# Patient Record
Sex: MALE | Race: WHITE
[De-identification: names, ages, dates, MRNs, and addresses within clinical notes are randomized per-mention and may not be internally consistent; named-entity substitution may affect disease eponyms.]

---

## 2017-02-23 ENCOUNTER — HOSPITAL ENCOUNTER (INPATIENT)
Dept: HOSPITAL 54 - ER | Age: 57
LOS: 8 days | Discharge: NURSING, REHAB OR LONG-TERM CARE | DRG: 466 | End: 2017-03-03
Attending: INTERNAL MEDICINE | Admitting: INTERNAL MEDICINE
Payer: COMMERCIAL

## 2017-02-23 VITALS — HEIGHT: 62 IN | WEIGHT: 122.03 LBS | BODY MASS INDEX: 22.46 KG/M2

## 2017-02-23 VITALS — DIASTOLIC BLOOD PRESSURE: 54 MMHG | SYSTOLIC BLOOD PRESSURE: 124 MMHG

## 2017-02-23 DIAGNOSIS — J44.9: ICD-10-CM

## 2017-02-23 DIAGNOSIS — K21.9: ICD-10-CM

## 2017-02-23 DIAGNOSIS — I50.32: ICD-10-CM

## 2017-02-23 DIAGNOSIS — Z99.2: ICD-10-CM

## 2017-02-23 DIAGNOSIS — N18.6: ICD-10-CM

## 2017-02-23 DIAGNOSIS — T82.510A: Primary | ICD-10-CM

## 2017-02-23 DIAGNOSIS — I25.10: ICD-10-CM

## 2017-02-23 DIAGNOSIS — I48.91: ICD-10-CM

## 2017-02-23 DIAGNOSIS — E78.5: ICD-10-CM
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
## 2017-02-23 DIAGNOSIS — D64.9: ICD-10-CM

## 2017-02-23 DIAGNOSIS — I13.0: ICD-10-CM
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
## 2017-02-23 DIAGNOSIS — E11.22: ICD-10-CM

## 2017-02-23 LAB
ALBUMIN SERPL BCP-MCNC: 2.6 G/DL (ref 3.4–5)
ALT SERPL W P-5'-P-CCNC: 14 U/L (ref 12–78)
ANION GAP SERPL CALC-SCNC: 12 MMOL/L (ref 5–14)
APTT PPP: 33 SEC (ref 23–34)
AST SERPL W P-5'-P-CCNC: 54 U/L (ref 15–37)
BASOPHILS # BLD AUTO: 0 /CMM (ref 0–0.2)
BASOPHILS NFR BLD AUTO: 0.7 % (ref 0–2)
BILIRUB DIRECT SERPL-MCNC: 0.2 MG/DL (ref 0–0.2)
BILIRUB INDIRECT SERPL-MCNC: 0.3 MG/DL (ref 0–1.1)
BILIRUB SERPL-MCNC: 0.5 MG/DL (ref 0.2–1)
BUN SERPL-MCNC: 51 MG/DL (ref 7–18)
CALCIUM SERPL-MCNC: 8.7 MG/DL (ref 8.5–10.1)
CHLORIDE SERPL-SCNC: 100 MMOL/L (ref 98–107)
CO2 SERPL-SCNC: 28 MMOL/L (ref 21–32)
CREAT SERPL-MCNC: 7.7 MG/DL (ref 0.6–1.3)
DIFF TOTAL %: 100 %
EOSINOPHIL # BLD AUTO: 0.5 /CMM (ref 0–0.7)
EOSINOPHIL NFR BLD AUTO: 8.4 % (ref 0–6)
GFR SERPLBLD BASED ON 1.73 SQ M-ARVRAT: 7 ML/MIN (ref 60–?)
GLUCOSE SERPL-MCNC: 47 MG/DL (ref 74–106)
HCT VFR BLD AUTO: 26 % (ref 39–51)
HGB BLD-MCNC: 8.4 G/DL (ref 13.5–17.5)
INR PPP: 1.2 (ref 0.87–1.13)
LYMPHOCYTES NFR BLD AUTO: 1.3 /CMM (ref 0.8–4.8)
LYMPHOCYTES NFR BLD AUTO: 21.4 % (ref 20–44)
MCH RBC QN AUTO: 30 PG (ref 26–33)
MCHC RBC AUTO-ENTMCNC: 32 G/DL (ref 31–36)
MCV RBC AUTO: 94 FL (ref 80–96)
MONOCYTES NFR BLD AUTO: 0.6 /CMM (ref 0.1–1.3)
MONOCYTES NFR BLD AUTO: 9.5 % (ref 2–12)
NEUTROPHILS # BLD AUTO: 3.8 /CMM (ref 1.8–8.9)
NEUTROPHILS NFR BLD AUTO: 60 % (ref 43–81)
PLATELET # BLD AUTO: 209 /CMM (ref 150–450)
POTASSIUM SERPL-SCNC: 4.5 MMOL/L (ref 3.5–5.1)
PROT SERPL-MCNC: 6.9 G/DL (ref 6.4–8.2)
PROTHROMBIN TIME: 12.6 SECS (ref 9.5–12.7)
RBC # BLD AUTO: 2.79 MIL/UL (ref 4.5–6)
SODIUM SERPL-SCNC: 135 MMOL/L (ref 136–145)
WBC NRBC COR # BLD AUTO: 6.2 K/UL (ref 4.3–11)

## 2017-02-23 PROCEDURE — P9047 ALBUMIN (HUMAN), 25%, 50ML: HCPCS

## 2017-02-23 PROCEDURE — A4606 OXYGEN PROBE USED W OXIMETER: HCPCS

## 2017-02-23 PROCEDURE — A6403 STERILE GAUZE>16 <= 48 SQ IN: HCPCS

## 2017-02-23 PROCEDURE — Z7610: HCPCS

## 2017-02-23 PROCEDURE — A6402 STERILE GAUZE <= 16 SQ IN: HCPCS

## 2017-02-23 PROCEDURE — A4216 STERILE WATER/SALINE, 10 ML: HCPCS

## 2017-02-23 PROCEDURE — A6248 HYDROGEL DRSG GEL FILLER: HCPCS

## 2017-02-23 PROCEDURE — C1769 GUIDE WIRE: HCPCS

## 2017-02-23 PROCEDURE — C1750 CATH, HEMODIALYSIS,LONG-TERM: HCPCS

## 2017-02-24 VITALS — DIASTOLIC BLOOD PRESSURE: 70 MMHG | SYSTOLIC BLOOD PRESSURE: 140 MMHG

## 2017-02-24 VITALS — DIASTOLIC BLOOD PRESSURE: 60 MMHG | SYSTOLIC BLOOD PRESSURE: 126 MMHG

## 2017-02-24 VITALS — DIASTOLIC BLOOD PRESSURE: 70 MMHG | SYSTOLIC BLOOD PRESSURE: 130 MMHG

## 2017-02-24 VITALS — SYSTOLIC BLOOD PRESSURE: 123 MMHG | DIASTOLIC BLOOD PRESSURE: 64 MMHG

## 2017-02-24 VITALS — DIASTOLIC BLOOD PRESSURE: 65 MMHG | SYSTOLIC BLOOD PRESSURE: 129 MMHG

## 2017-02-24 VITALS — DIASTOLIC BLOOD PRESSURE: 69 MMHG | SYSTOLIC BLOOD PRESSURE: 131 MMHG

## 2017-02-24 LAB
ALBUMIN SERPL BCP-MCNC: 2.5 G/DL (ref 3.4–5)
ALT SERPL W P-5'-P-CCNC: 24 U/L (ref 12–78)
ANION GAP SERPL CALC-SCNC: 13 MMOL/L (ref 5–14)
AST SERPL W P-5'-P-CCNC: 45 U/L (ref 15–37)
BASOPHILS # BLD AUTO: 0.1 /CMM (ref 0–0.2)
BASOPHILS NFR BLD AUTO: 1 % (ref 0–2)
BILIRUB SERPL-MCNC: 0.5 MG/DL (ref 0.2–1)
BUN SERPL-MCNC: 53 MG/DL (ref 7–18)
CALCIUM SERPL-MCNC: 8.6 MG/DL (ref 8.5–10.1)
CHLORIDE SERPL-SCNC: 99 MMOL/L (ref 98–107)
CO2 SERPL-SCNC: 29 MMOL/L (ref 21–32)
CREAT SERPL-MCNC: 8.5 MG/DL (ref 0.6–1.3)
DIFF TOTAL %: 100 %
EOSINOPHIL # BLD AUTO: 0.5 /CMM (ref 0–0.7)
EOSINOPHIL NFR BLD AUTO: 9.4 % (ref 0–6)
GFR SERPLBLD BASED ON 1.73 SQ M-ARVRAT: 6 ML/MIN (ref 60–?)
GLUCOSE SERPL-MCNC: 76 MG/DL (ref 74–106)
HCT VFR BLD AUTO: 24 % (ref 39–51)
HGB BLD-MCNC: 7.5 G/DL (ref 13.5–17.5)
IRON SERPL-MCNC: 53 UG/DL (ref 50–175)
LYMPHOCYTES NFR BLD AUTO: 1.1 /CMM (ref 0.8–4.8)
LYMPHOCYTES NFR BLD AUTO: 20.5 % (ref 20–44)
MCH RBC QN AUTO: 30 PG (ref 26–33)
MCHC RBC AUTO-ENTMCNC: 32 G/DL (ref 31–36)
MCV RBC AUTO: 93 FL (ref 80–96)
MONOCYTES NFR BLD AUTO: 0.5 /CMM (ref 0.1–1.3)
MONOCYTES NFR BLD AUTO: 9.5 % (ref 2–12)
NEUTROPHILS # BLD AUTO: 3.2 /CMM (ref 1.8–8.9)
NEUTROPHILS NFR BLD AUTO: 59.6 % (ref 43–81)
PHOSPHATE SERPL-MCNC: 6 MG/DL (ref 2.5–4.9)
PLATELET # BLD AUTO: 213 /CMM (ref 150–450)
POTASSIUM SERPL-SCNC: 4.9 MMOL/L (ref 3.5–5.1)
PROT SERPL-MCNC: 6.8 G/DL (ref 6.4–8.2)
RBC # BLD AUTO: 2.52 MIL/UL (ref 4.5–6)
SAO2 % BLD FROM PO2: 56 % (ref 14–33)
SODIUM SERPL-SCNC: 136 MMOL/L (ref 136–145)
TIBC SERPL-MCNC: 94 UG/DL (ref 250–450)
WBC NRBC COR # BLD AUTO: 5.4 K/UL (ref 4.3–11)

## 2017-02-24 RX ADMIN — ASPIRIN 81 MG SCH MG: 81 TABLET ORAL at 11:54

## 2017-02-24 RX ADMIN — Medication SCH TAB: at 11:55

## 2017-02-24 RX ADMIN — Medication SCH EACH: at 21:40

## 2017-02-24 RX ADMIN — LEVETIRACETAM SCH MG: 250 TABLET, FILM COATED ORAL at 11:55

## 2017-02-24 RX ADMIN — METOPROLOL TARTRATE SCH MG: 50 TABLET, FILM COATED ORAL at 11:55

## 2017-02-24 RX ADMIN — SILVER SULFADIAZINE SCH GM: 10 CREAM TOPICAL at 17:00

## 2017-02-24 RX ADMIN — LISINOPRIL SCH MG: 20 TABLET ORAL at 11:00

## 2017-02-24 RX ADMIN — AMLODIPINE BESYLATE SCH MG: 10 TABLET ORAL at 09:30

## 2017-02-24 RX ADMIN — Medication SCH MG: at 11:55

## 2017-02-24 RX ADMIN — SILVER SULFADIAZINE SCH GM: 10 CREAM TOPICAL at 15:11

## 2017-02-24 RX ADMIN — METOPROLOL TARTRATE SCH MG: 50 TABLET, FILM COATED ORAL at 21:41

## 2017-02-24 RX ADMIN — ESCITALOPRAM OXALATE SCH MG: 10 TABLET, FILM COATED ORAL at 11:55

## 2017-02-24 RX ADMIN — EPOETIN ALFA SCH UNIT: 10000 SOLUTION INTRAVENOUS; SUBCUTANEOUS at 15:10

## 2017-02-24 RX ADMIN — AMIODARONE HYDROCHLORIDE SCH MG: 200 TABLET ORAL at 11:55

## 2017-02-24 RX ADMIN — ATORVASTATIN CALCIUM SCH MG: 40 TABLET, FILM COATED ORAL at 21:41

## 2017-02-25 VITALS — DIASTOLIC BLOOD PRESSURE: 59 MMHG | SYSTOLIC BLOOD PRESSURE: 131 MMHG

## 2017-02-25 VITALS — SYSTOLIC BLOOD PRESSURE: 120 MMHG | DIASTOLIC BLOOD PRESSURE: 54 MMHG

## 2017-02-25 VITALS — SYSTOLIC BLOOD PRESSURE: 131 MMHG | DIASTOLIC BLOOD PRESSURE: 59 MMHG

## 2017-02-25 VITALS — SYSTOLIC BLOOD PRESSURE: 114 MMHG | DIASTOLIC BLOOD PRESSURE: 40 MMHG

## 2017-02-25 LAB
ANION GAP SERPL CALC-SCNC: 15 MMOL/L (ref 5–14)
BASOPHILS # BLD AUTO: 0 /CMM (ref 0–0.2)
BASOPHILS NFR BLD AUTO: 0.5 % (ref 0–2)
BUN SERPL-MCNC: 58 MG/DL (ref 7–18)
CALCIUM SERPL-MCNC: 8.7 MG/DL (ref 8.5–10.1)
CHLORIDE SERPL-SCNC: 103 MMOL/L (ref 98–107)
CO2 SERPL-SCNC: 28 MMOL/L (ref 21–32)
CREAT SERPL-MCNC: 9.3 MG/DL (ref 0.6–1.3)
DIFF TOTAL %: 100 %
EOSINOPHIL # BLD AUTO: 0.7 /CMM (ref 0–0.7)
EOSINOPHIL NFR BLD AUTO: 12.2 % (ref 0–6)
GFR SERPLBLD BASED ON 1.73 SQ M-ARVRAT: 6 ML/MIN (ref 60–?)
GLUCOSE SERPL-MCNC: 75 MG/DL (ref 74–106)
HCT VFR BLD AUTO: 26 % (ref 39–51)
HGB BLD-MCNC: 8 G/DL (ref 13.5–17.5)
LYMPHOCYTES NFR BLD AUTO: 1.4 /CMM (ref 0.8–4.8)
LYMPHOCYTES NFR BLD AUTO: 24.6 % (ref 20–44)
MCH RBC QN AUTO: 29 PG (ref 26–33)
MCHC RBC AUTO-ENTMCNC: 31 G/DL (ref 31–36)
MCV RBC AUTO: 93 FL (ref 80–96)
MONOCYTES NFR BLD AUTO: 0.7 /CMM (ref 0.1–1.3)
MONOCYTES NFR BLD AUTO: 11.8 % (ref 2–12)
NEUTROPHILS # BLD AUTO: 2.9 /CMM (ref 1.8–8.9)
NEUTROPHILS NFR BLD AUTO: 50.9 % (ref 43–81)
PHOSPHATE SERPL-MCNC: 5.6 MG/DL (ref 2.5–4.9)
PLATELET # BLD AUTO: 231 /CMM (ref 150–450)
POTASSIUM SERPL-SCNC: 5.2 MMOL/L (ref 3.5–5.1)
RBC # BLD AUTO: 2.76 MIL/UL (ref 4.5–6)
SODIUM SERPL-SCNC: 141 MMOL/L (ref 136–145)
WBC NRBC COR # BLD AUTO: 5.8 K/UL (ref 4.3–11)

## 2017-02-25 PROCEDURE — B514YZA FLUOROSCOPY OF LEFT JUGULAR VEINS USING OTHER CONTRAST, GUIDANCE: ICD-10-PCS | Performed by: SURGERY

## 2017-02-25 PROCEDURE — 05HN33Z INSERTION OF INFUSION DEVICE INTO LEFT INTERNAL JUGULAR VEIN, PERCUTANEOUS APPROACH: ICD-10-PCS | Performed by: SURGERY

## 2017-02-25 RX ADMIN — ATORVASTATIN CALCIUM SCH MG: 40 TABLET, FILM COATED ORAL at 21:46

## 2017-02-25 RX ADMIN — LEVETIRACETAM SCH MG: 250 TABLET, FILM COATED ORAL at 11:30

## 2017-02-25 RX ADMIN — AMIODARONE HYDROCHLORIDE SCH MG: 200 TABLET ORAL at 08:29

## 2017-02-25 RX ADMIN — Medication SCH EACH: at 21:50

## 2017-02-25 RX ADMIN — Medication SCH EACH: at 06:27

## 2017-02-25 RX ADMIN — Medication SCH MG: at 08:28

## 2017-02-25 RX ADMIN — AMLODIPINE BESYLATE SCH MG: 10 TABLET ORAL at 08:26

## 2017-02-25 RX ADMIN — LISINOPRIL SCH MG: 20 TABLET ORAL at 08:27

## 2017-02-25 RX ADMIN — Medication SCH TAB: at 08:28

## 2017-02-25 RX ADMIN — ESCITALOPRAM OXALATE SCH MG: 10 TABLET, FILM COATED ORAL at 08:28

## 2017-02-25 RX ADMIN — Medication SCH EACH: at 11:33

## 2017-02-25 RX ADMIN — SILVER SULFADIAZINE SCH GM: 10 CREAM TOPICAL at 08:31

## 2017-02-25 RX ADMIN — METOPROLOL TARTRATE SCH MG: 50 TABLET, FILM COATED ORAL at 08:30

## 2017-02-25 RX ADMIN — SILVER SULFADIAZINE SCH GM: 10 CREAM TOPICAL at 17:47

## 2017-02-25 RX ADMIN — ASPIRIN 81 MG SCH MG: 81 TABLET ORAL at 08:28

## 2017-02-25 RX ADMIN — METOPROLOL TARTRATE SCH MG: 50 TABLET, FILM COATED ORAL at 21:46

## 2017-02-25 RX ADMIN — Medication SCH EACH: at 17:42

## 2017-02-25 RX ADMIN — EPOETIN ALFA SCH UNIT: 10000 SOLUTION INTRAVENOUS; SUBCUTANEOUS at 12:34

## 2017-02-26 VITALS — SYSTOLIC BLOOD PRESSURE: 134 MMHG | DIASTOLIC BLOOD PRESSURE: 62 MMHG

## 2017-02-26 VITALS — DIASTOLIC BLOOD PRESSURE: 66 MMHG | SYSTOLIC BLOOD PRESSURE: 128 MMHG

## 2017-02-26 VITALS — DIASTOLIC BLOOD PRESSURE: 63 MMHG | SYSTOLIC BLOOD PRESSURE: 142 MMHG

## 2017-02-26 VITALS — DIASTOLIC BLOOD PRESSURE: 56 MMHG | SYSTOLIC BLOOD PRESSURE: 135 MMHG

## 2017-02-26 RX ADMIN — Medication SCH EACH: at 21:44

## 2017-02-26 RX ADMIN — ATORVASTATIN CALCIUM SCH MG: 40 TABLET, FILM COATED ORAL at 21:45

## 2017-02-26 RX ADMIN — Medication SCH TAB: at 08:29

## 2017-02-26 RX ADMIN — APIXABAN SCH MG: 2.5 TABLET, FILM COATED ORAL at 10:53

## 2017-02-26 RX ADMIN — METOPROLOL TARTRATE SCH MG: 50 TABLET, FILM COATED ORAL at 22:11

## 2017-02-26 RX ADMIN — SILVER SULFADIAZINE SCH GM: 10 CREAM TOPICAL at 16:37

## 2017-02-26 RX ADMIN — MUPIROCIN SCH GM: 20 OINTMENT TOPICAL at 21:45

## 2017-02-26 RX ADMIN — AMIODARONE HYDROCHLORIDE SCH MG: 200 TABLET ORAL at 08:31

## 2017-02-26 RX ADMIN — LISINOPRIL SCH MG: 20 TABLET ORAL at 08:30

## 2017-02-26 RX ADMIN — ESCITALOPRAM OXALATE SCH MG: 10 TABLET, FILM COATED ORAL at 08:29

## 2017-02-26 RX ADMIN — AMLODIPINE BESYLATE SCH MG: 10 TABLET ORAL at 08:29

## 2017-02-26 RX ADMIN — METOPROLOL TARTRATE SCH MG: 50 TABLET, FILM COATED ORAL at 21:00

## 2017-02-26 RX ADMIN — Medication SCH MG: at 08:29

## 2017-02-26 RX ADMIN — SILVER SULFADIAZINE SCH GM: 10 CREAM TOPICAL at 08:39

## 2017-02-26 RX ADMIN — Medication SCH EACH: at 06:30

## 2017-02-26 RX ADMIN — LEVETIRACETAM SCH MG: 250 TABLET, FILM COATED ORAL at 10:52

## 2017-02-26 RX ADMIN — MUPIROCIN SCH GM: 20 OINTMENT TOPICAL at 10:53

## 2017-02-26 RX ADMIN — ASPIRIN 81 MG SCH MG: 81 TABLET ORAL at 08:29

## 2017-02-26 RX ADMIN — METOPROLOL TARTRATE SCH MG: 50 TABLET, FILM COATED ORAL at 08:30

## 2017-02-26 RX ADMIN — Medication SCH EACH: at 16:53

## 2017-02-26 RX ADMIN — Medication SCH EACH: at 12:22

## 2017-02-26 RX ADMIN — APIXABAN SCH MG: 2.5 TABLET, FILM COATED ORAL at 21:48

## 2017-02-27 VITALS — SYSTOLIC BLOOD PRESSURE: 142 MMHG | DIASTOLIC BLOOD PRESSURE: 63 MMHG

## 2017-02-27 VITALS — DIASTOLIC BLOOD PRESSURE: 54 MMHG | SYSTOLIC BLOOD PRESSURE: 120 MMHG

## 2017-02-27 VITALS — DIASTOLIC BLOOD PRESSURE: 55 MMHG | SYSTOLIC BLOOD PRESSURE: 140 MMHG

## 2017-02-27 PROCEDURE — 5A1D60Z: ICD-10-PCS | Performed by: INTERNAL MEDICINE

## 2017-02-27 RX ADMIN — Medication SCH TAB: at 10:48

## 2017-02-27 RX ADMIN — Medication SCH EACH: at 16:43

## 2017-02-27 RX ADMIN — MUPIROCIN SCH APPLIC: 20 OINTMENT TOPICAL at 21:54

## 2017-02-27 RX ADMIN — AMLODIPINE BESYLATE SCH MG: 10 TABLET ORAL at 10:47

## 2017-02-27 RX ADMIN — METOPROLOL TARTRATE SCH MG: 50 TABLET, FILM COATED ORAL at 09:00

## 2017-02-27 RX ADMIN — ATORVASTATIN CALCIUM SCH MG: 40 TABLET, FILM COATED ORAL at 21:54

## 2017-02-27 RX ADMIN — APIXABAN SCH MG: 2.5 TABLET, FILM COATED ORAL at 10:47

## 2017-02-27 RX ADMIN — Medication SCH EACH: at 06:34

## 2017-02-27 RX ADMIN — APIXABAN SCH MG: 2.5 TABLET, FILM COATED ORAL at 16:43

## 2017-02-27 RX ADMIN — AMIODARONE HYDROCHLORIDE SCH MG: 200 TABLET ORAL at 09:00

## 2017-02-27 RX ADMIN — METOPROLOL TARTRATE SCH MG: 50 TABLET, FILM COATED ORAL at 21:54

## 2017-02-27 RX ADMIN — LEVETIRACETAM SCH MG: 250 TABLET, FILM COATED ORAL at 10:47

## 2017-02-27 RX ADMIN — MUPIROCIN SCH GM: 20 OINTMENT TOPICAL at 10:49

## 2017-02-27 RX ADMIN — Medication SCH MG: at 10:47

## 2017-02-27 RX ADMIN — SILVER SULFADIAZINE SCH GM: 10 CREAM TOPICAL at 16:43

## 2017-02-27 RX ADMIN — SILVER SULFADIAZINE SCH GM: 10 CREAM TOPICAL at 10:49

## 2017-02-27 RX ADMIN — Medication SCH EACH: at 12:01

## 2017-02-27 RX ADMIN — Medication SCH EACH: at 21:54

## 2017-02-27 RX ADMIN — LISINOPRIL SCH MG: 20 TABLET ORAL at 10:47

## 2017-02-27 RX ADMIN — ESCITALOPRAM OXALATE SCH MG: 10 TABLET, FILM COATED ORAL at 10:47

## 2017-02-27 RX ADMIN — ASPIRIN 81 MG SCH MG: 81 TABLET ORAL at 10:48

## 2017-02-28 VITALS — SYSTOLIC BLOOD PRESSURE: 141 MMHG | DIASTOLIC BLOOD PRESSURE: 55 MMHG

## 2017-02-28 VITALS — DIASTOLIC BLOOD PRESSURE: 60 MMHG | SYSTOLIC BLOOD PRESSURE: 119 MMHG

## 2017-02-28 VITALS — SYSTOLIC BLOOD PRESSURE: 119 MMHG | DIASTOLIC BLOOD PRESSURE: 60 MMHG

## 2017-02-28 VITALS — DIASTOLIC BLOOD PRESSURE: 79 MMHG | SYSTOLIC BLOOD PRESSURE: 171 MMHG

## 2017-02-28 LAB
ANION GAP SERPL CALC-SCNC: 10 MMOL/L (ref 5–14)
BASOPHILS # BLD AUTO: 0.1 /CMM (ref 0–0.2)
BASOPHILS NFR BLD AUTO: 1 % (ref 0–2)
BUN SERPL-MCNC: 23 MG/DL (ref 7–18)
CALCIUM SERPL-MCNC: 8.5 MG/DL (ref 8.5–10.1)
CHLORIDE SERPL-SCNC: 103 MMOL/L (ref 98–107)
CO2 SERPL-SCNC: 32 MMOL/L (ref 21–32)
CREAT SERPL-MCNC: 6.2 MG/DL (ref 0.6–1.3)
DIFF TOTAL %: 100 %
EOSINOPHIL # BLD AUTO: 0.6 /CMM (ref 0–0.7)
EOSINOPHIL NFR BLD AUTO: 9.8 % (ref 0–6)
GFR SERPLBLD BASED ON 1.73 SQ M-ARVRAT: 9 ML/MIN (ref 60–?)
GLUCOSE SERPL-MCNC: 80 MG/DL (ref 74–106)
HCT VFR BLD AUTO: 23 % (ref 39–51)
HGB BLD-MCNC: 7.2 G/DL (ref 13.5–17.5)
INR PPP: 1.19 (ref 0.87–1.13)
LYMPHOCYTES NFR BLD AUTO: 1.3 /CMM (ref 0.8–4.8)
LYMPHOCYTES NFR BLD AUTO: 23.4 % (ref 20–44)
MCH RBC QN AUTO: 30 PG (ref 26–33)
MCHC RBC AUTO-ENTMCNC: 32 G/DL (ref 31–36)
MCV RBC AUTO: 95 FL (ref 80–96)
MONOCYTES NFR BLD AUTO: 0.8 /CMM (ref 0.1–1.3)
MONOCYTES NFR BLD AUTO: 13.7 % (ref 2–12)
NEUTROPHILS # BLD AUTO: 2.9 /CMM (ref 1.8–8.9)
NEUTROPHILS NFR BLD AUTO: 52.1 % (ref 43–81)
PHOSPHATE SERPL-MCNC: 4.7 MG/DL (ref 2.5–4.9)
PLATELET # BLD AUTO: 129 /CMM (ref 150–450)
POTASSIUM SERPL-SCNC: 4.1 MMOL/L (ref 3.5–5.1)
PROTHROMBIN TIME: 12.9 SECS (ref 9.5–12.7)
RBC # BLD AUTO: 2.39 MIL/UL (ref 4.5–6)
SODIUM SERPL-SCNC: 141 MMOL/L (ref 136–145)
WBC NRBC COR # BLD AUTO: 5.7 K/UL (ref 4.3–11)

## 2017-02-28 RX ADMIN — METOPROLOL TARTRATE SCH MG: 50 TABLET, FILM COATED ORAL at 08:30

## 2017-02-28 RX ADMIN — Medication SCH MG: at 08:30

## 2017-02-28 RX ADMIN — AMIODARONE HYDROCHLORIDE SCH MG: 200 TABLET ORAL at 08:30

## 2017-02-28 RX ADMIN — ASPIRIN 81 MG SCH MG: 81 TABLET ORAL at 08:30

## 2017-02-28 RX ADMIN — MUPIROCIN SCH APPLIC: 20 OINTMENT TOPICAL at 21:00

## 2017-02-28 RX ADMIN — Medication SCH EACH: at 07:30

## 2017-02-28 RX ADMIN — LEVETIRACETAM SCH MG: 250 TABLET, FILM COATED ORAL at 09:40

## 2017-02-28 RX ADMIN — AMLODIPINE BESYLATE SCH MG: 10 TABLET ORAL at 08:29

## 2017-02-28 RX ADMIN — Medication SCH EACH: at 11:34

## 2017-02-28 RX ADMIN — Medication SCH TAB: at 08:29

## 2017-02-28 RX ADMIN — MUPIROCIN SCH APPLIC: 20 OINTMENT TOPICAL at 08:29

## 2017-02-28 RX ADMIN — SILVER SULFADIAZINE SCH GM: 10 CREAM TOPICAL at 08:29

## 2017-02-28 RX ADMIN — ESCITALOPRAM OXALATE SCH MG: 10 TABLET, FILM COATED ORAL at 08:30

## 2017-02-28 RX ADMIN — ATORVASTATIN CALCIUM SCH MG: 40 TABLET, FILM COATED ORAL at 20:53

## 2017-02-28 RX ADMIN — LISINOPRIL SCH MG: 20 TABLET ORAL at 08:30

## 2017-02-28 RX ADMIN — APIXABAN SCH MG: 2.5 TABLET, FILM COATED ORAL at 09:00

## 2017-02-28 RX ADMIN — Medication SCH EACH: at 21:02

## 2017-02-28 RX ADMIN — METOPROLOL TARTRATE SCH MG: 50 TABLET, FILM COATED ORAL at 20:54

## 2017-02-28 RX ADMIN — APIXABAN SCH MG: 2.5 TABLET, FILM COATED ORAL at 16:33

## 2017-02-28 RX ADMIN — Medication SCH EACH: at 16:33

## 2017-02-28 RX ADMIN — SILVER SULFADIAZINE SCH GM: 10 CREAM TOPICAL at 16:33

## 2017-03-01 VITALS — DIASTOLIC BLOOD PRESSURE: 68 MMHG | SYSTOLIC BLOOD PRESSURE: 128 MMHG

## 2017-03-01 VITALS — DIASTOLIC BLOOD PRESSURE: 44 MMHG | SYSTOLIC BLOOD PRESSURE: 110 MMHG

## 2017-03-01 VITALS — DIASTOLIC BLOOD PRESSURE: 68 MMHG | SYSTOLIC BLOOD PRESSURE: 138 MMHG

## 2017-03-01 LAB
ANION GAP SERPL CALC-SCNC: 10 MMOL/L (ref 5–14)
BASOPHILS # BLD AUTO: 0 /CMM (ref 0–0.2)
BASOPHILS NFR BLD AUTO: 0.5 % (ref 0–2)
BUN SERPL-MCNC: 22 MG/DL (ref 7–18)
CALCIUM SERPL-MCNC: 8.1 MG/DL (ref 8.5–10.1)
CHLORIDE SERPL-SCNC: 100 MMOL/L (ref 98–107)
CO2 SERPL-SCNC: 31 MMOL/L (ref 21–32)
CREAT SERPL-MCNC: 5.9 MG/DL (ref 0.6–1.3)
DIFF TOTAL %: 100 %
EOSINOPHIL # BLD AUTO: 0.5 /CMM (ref 0–0.7)
EOSINOPHIL NFR BLD AUTO: 7.9 % (ref 0–6)
GFR SERPLBLD BASED ON 1.73 SQ M-ARVRAT: 10 ML/MIN (ref 60–?)
GLUCOSE SERPL-MCNC: 90 MG/DL (ref 74–106)
HCT VFR BLD AUTO: 26 % (ref 39–51)
HGB BLD-MCNC: 8.4 G/DL (ref 13.5–17.5)
LYMPHOCYTES NFR BLD AUTO: 1 /CMM (ref 0.8–4.8)
LYMPHOCYTES NFR BLD AUTO: 16.6 % (ref 20–44)
MCH RBC QN AUTO: 29 PG (ref 26–33)
MCHC RBC AUTO-ENTMCNC: 32 G/DL (ref 31–36)
MCV RBC AUTO: 92 FL (ref 80–96)
MONOCYTES NFR BLD AUTO: 0.7 /CMM (ref 0.1–1.3)
MONOCYTES NFR BLD AUTO: 12.2 % (ref 2–12)
NEUTROPHILS # BLD AUTO: 3.8 /CMM (ref 1.8–8.9)
NEUTROPHILS NFR BLD AUTO: 62.8 % (ref 43–81)
PHOSPHATE SERPL-MCNC: 3.6 MG/DL (ref 2.5–4.9)
PLATELET # BLD AUTO: 121 /CMM (ref 150–450)
POTASSIUM SERPL-SCNC: 4.3 MMOL/L (ref 3.5–5.1)
RBC # BLD AUTO: 2.87 MIL/UL (ref 4.5–6)
SODIUM SERPL-SCNC: 137 MMOL/L (ref 136–145)
WBC NRBC COR # BLD AUTO: 6 K/UL (ref 4.3–11)

## 2017-03-01 PROCEDURE — 30233N1 TRANSFUSION OF NONAUTOLOGOUS RED BLOOD CELLS INTO PERIPHERAL VEIN, PERCUTANEOUS APPROACH: ICD-10-PCS | Performed by: INTERNAL MEDICINE

## 2017-03-01 RX ADMIN — Medication SCH EACH: at 05:52

## 2017-03-01 RX ADMIN — LISINOPRIL SCH MG: 20 TABLET ORAL at 09:42

## 2017-03-01 RX ADMIN — Medication SCH MG: at 09:45

## 2017-03-01 RX ADMIN — MUPIROCIN SCH APPLIC: 20 OINTMENT TOPICAL at 20:54

## 2017-03-01 RX ADMIN — APIXABAN SCH MG: 2.5 TABLET, FILM COATED ORAL at 16:59

## 2017-03-01 RX ADMIN — ATORVASTATIN CALCIUM SCH MG: 40 TABLET, FILM COATED ORAL at 21:16

## 2017-03-01 RX ADMIN — AMIODARONE HYDROCHLORIDE SCH MG: 200 TABLET ORAL at 09:43

## 2017-03-01 RX ADMIN — ESCITALOPRAM OXALATE SCH MG: 10 TABLET, FILM COATED ORAL at 09:42

## 2017-03-01 RX ADMIN — APIXABAN SCH MG: 2.5 TABLET, FILM COATED ORAL at 09:43

## 2017-03-01 RX ADMIN — Medication SCH TAB: at 09:43

## 2017-03-01 RX ADMIN — METOPROLOL TARTRATE SCH MG: 50 TABLET, FILM COATED ORAL at 09:43

## 2017-03-01 RX ADMIN — SILVER SULFADIAZINE SCH GM: 10 CREAM TOPICAL at 16:55

## 2017-03-01 RX ADMIN — AMLODIPINE BESYLATE SCH MG: 10 TABLET ORAL at 09:42

## 2017-03-01 RX ADMIN — SILVER SULFADIAZINE SCH GM: 10 CREAM TOPICAL at 09:52

## 2017-03-01 RX ADMIN — LEVETIRACETAM SCH MG: 250 TABLET, FILM COATED ORAL at 09:41

## 2017-03-01 RX ADMIN — Medication SCH EACH: at 11:28

## 2017-03-01 RX ADMIN — Medication SCH EACH: at 21:54

## 2017-03-01 RX ADMIN — MUPIROCIN SCH APPLIC: 20 OINTMENT TOPICAL at 09:52

## 2017-03-01 RX ADMIN — Medication SCH EACH: at 16:56

## 2017-03-01 RX ADMIN — ASPIRIN 81 MG SCH MG: 81 TABLET ORAL at 09:43

## 2017-03-01 RX ADMIN — METOPROLOL TARTRATE SCH MG: 50 TABLET, FILM COATED ORAL at 21:00

## 2017-03-02 VITALS — SYSTOLIC BLOOD PRESSURE: 132 MMHG | DIASTOLIC BLOOD PRESSURE: 65 MMHG

## 2017-03-02 VITALS — SYSTOLIC BLOOD PRESSURE: 123 MMHG | DIASTOLIC BLOOD PRESSURE: 66 MMHG

## 2017-03-02 VITALS — DIASTOLIC BLOOD PRESSURE: 64 MMHG | SYSTOLIC BLOOD PRESSURE: 130 MMHG

## 2017-03-02 LAB
ANION GAP SERPL CALC-SCNC: 12 MMOL/L (ref 5–14)
BASOPHILS # BLD AUTO: 0 /CMM (ref 0–0.2)
BASOPHILS NFR BLD AUTO: 0.3 % (ref 0–2)
BUN SERPL-MCNC: 27 MG/DL (ref 7–18)
CALCIUM SERPL-MCNC: 8.3 MG/DL (ref 8.5–10.1)
CHLORIDE SERPL-SCNC: 98 MMOL/L (ref 98–107)
CO2 SERPL-SCNC: 31 MMOL/L (ref 21–32)
CREAT SERPL-MCNC: 6.8 MG/DL (ref 0.6–1.3)
DIFF TOTAL %: 100 %
EOSINOPHIL # BLD AUTO: 0.4 /CMM (ref 0–0.7)
EOSINOPHIL NFR BLD AUTO: 4.8 % (ref 0–6)
GFR SERPLBLD BASED ON 1.73 SQ M-ARVRAT: 8 ML/MIN (ref 60–?)
GLUCOSE SERPL-MCNC: 76 MG/DL (ref 74–106)
HCT VFR BLD AUTO: 25 % (ref 39–51)
HGB BLD-MCNC: 8 G/DL (ref 13.5–17.5)
LYMPHOCYTES NFR BLD AUTO: 1.3 /CMM (ref 0.8–4.8)
LYMPHOCYTES NFR BLD AUTO: 14.7 % (ref 20–44)
MCH RBC QN AUTO: 29 PG (ref 26–33)
MCHC RBC AUTO-ENTMCNC: 32 G/DL (ref 31–36)
MCV RBC AUTO: 93 FL (ref 80–96)
MONOCYTES NFR BLD AUTO: 1.1 /CMM (ref 0.1–1.3)
MONOCYTES NFR BLD AUTO: 11.7 % (ref 2–12)
NEUTROPHILS # BLD AUTO: 6.3 /CMM (ref 1.8–8.9)
NEUTROPHILS NFR BLD AUTO: 68.5 % (ref 43–81)
PHOSPHATE SERPL-MCNC: 4.5 MG/DL (ref 2.5–4.9)
PLATELET # BLD AUTO: 116 /CMM (ref 150–450)
POTASSIUM SERPL-SCNC: 4.8 MMOL/L (ref 3.5–5.1)
RBC # BLD AUTO: 2.72 MIL/UL (ref 4.5–6)
SODIUM SERPL-SCNC: 136 MMOL/L (ref 136–145)
WBC NRBC COR # BLD AUTO: 9.1 K/UL (ref 4.3–11)

## 2017-03-02 RX ADMIN — APIXABAN SCH MG: 2.5 TABLET, FILM COATED ORAL at 17:50

## 2017-03-02 RX ADMIN — METOPROLOL TARTRATE SCH MG: 50 TABLET, FILM COATED ORAL at 08:55

## 2017-03-02 RX ADMIN — ASPIRIN 81 MG SCH MG: 81 TABLET ORAL at 08:41

## 2017-03-02 RX ADMIN — Medication PRN APPLIC: at 08:44

## 2017-03-02 RX ADMIN — Medication SCH EACH: at 22:56

## 2017-03-02 RX ADMIN — APIXABAN SCH MG: 2.5 TABLET, FILM COATED ORAL at 09:01

## 2017-03-02 RX ADMIN — Medication SCH MG: at 08:40

## 2017-03-02 RX ADMIN — Medication SCH EACH: at 13:00

## 2017-03-02 RX ADMIN — MUPIROCIN SCH APPLIC: 20 OINTMENT TOPICAL at 08:43

## 2017-03-02 RX ADMIN — Medication PRN APPLIC: at 08:56

## 2017-03-02 RX ADMIN — SILVER SULFADIAZINE SCH GM: 10 CREAM TOPICAL at 17:59

## 2017-03-02 RX ADMIN — Medication SCH EACH: at 17:59

## 2017-03-02 RX ADMIN — Medication PRN APPLIC: at 09:01

## 2017-03-02 RX ADMIN — LISINOPRIL SCH MG: 20 TABLET ORAL at 08:55

## 2017-03-02 RX ADMIN — Medication SCH TAB: at 08:40

## 2017-03-02 RX ADMIN — LEVETIRACETAM SCH MG: 250 TABLET, FILM COATED ORAL at 11:17

## 2017-03-02 RX ADMIN — MUPIROCIN SCH APPLIC: 20 OINTMENT TOPICAL at 20:59

## 2017-03-02 RX ADMIN — AMLODIPINE BESYLATE SCH MG: 10 TABLET ORAL at 08:54

## 2017-03-02 RX ADMIN — Medication PRN APPLIC: at 17:50

## 2017-03-02 RX ADMIN — ESCITALOPRAM OXALATE SCH MG: 10 TABLET, FILM COATED ORAL at 08:40

## 2017-03-02 RX ADMIN — ATORVASTATIN CALCIUM SCH MG: 40 TABLET, FILM COATED ORAL at 21:34

## 2017-03-02 RX ADMIN — SILVER SULFADIAZINE SCH GM: 10 CREAM TOPICAL at 09:02

## 2017-03-02 RX ADMIN — METOPROLOL TARTRATE SCH MG: 50 TABLET, FILM COATED ORAL at 21:01

## 2017-03-02 RX ADMIN — AMIODARONE HYDROCHLORIDE SCH MG: 200 TABLET ORAL at 08:54

## 2017-03-02 RX ADMIN — Medication SCH EACH: at 06:21

## 2017-03-03 VITALS — SYSTOLIC BLOOD PRESSURE: 125 MMHG | DIASTOLIC BLOOD PRESSURE: 58 MMHG

## 2017-03-03 VITALS — SYSTOLIC BLOOD PRESSURE: 104 MMHG | DIASTOLIC BLOOD PRESSURE: 50 MMHG

## 2017-03-03 VITALS — DIASTOLIC BLOOD PRESSURE: 63 MMHG | SYSTOLIC BLOOD PRESSURE: 126 MMHG

## 2017-03-03 RX ADMIN — AMLODIPINE BESYLATE SCH MG: 10 TABLET ORAL at 10:29

## 2017-03-03 RX ADMIN — Medication PRN APPLIC: at 16:42

## 2017-03-03 RX ADMIN — Medication SCH EACH: at 18:03

## 2017-03-03 RX ADMIN — Medication SCH EACH: at 12:54

## 2017-03-03 RX ADMIN — AMIODARONE HYDROCHLORIDE SCH MG: 200 TABLET ORAL at 10:29

## 2017-03-03 RX ADMIN — APIXABAN SCH MG: 2.5 TABLET, FILM COATED ORAL at 10:32

## 2017-03-03 RX ADMIN — Medication SCH TAB: at 09:48

## 2017-03-03 RX ADMIN — ESCITALOPRAM OXALATE SCH MG: 10 TABLET, FILM COATED ORAL at 09:49

## 2017-03-03 RX ADMIN — Medication SCH EACH: at 21:02

## 2017-03-03 RX ADMIN — LISINOPRIL SCH MG: 20 TABLET ORAL at 10:30

## 2017-03-03 RX ADMIN — SILVER SULFADIAZINE SCH GM: 10 CREAM TOPICAL at 16:43

## 2017-03-03 RX ADMIN — Medication PRN APPLIC: at 09:49

## 2017-03-03 RX ADMIN — LEVETIRACETAM SCH MG: 250 TABLET, FILM COATED ORAL at 10:28

## 2017-03-03 RX ADMIN — Medication SCH MG: at 09:49

## 2017-03-03 RX ADMIN — Medication SCH EACH: at 06:10

## 2017-03-03 RX ADMIN — MUPIROCIN SCH APPLIC: 20 OINTMENT TOPICAL at 21:05

## 2017-03-03 RX ADMIN — MUPIROCIN SCH APPLIC: 20 OINTMENT TOPICAL at 10:23

## 2017-03-03 RX ADMIN — METOPROLOL TARTRATE SCH MG: 50 TABLET, FILM COATED ORAL at 10:27

## 2017-03-03 RX ADMIN — ASPIRIN 81 MG SCH MG: 81 TABLET ORAL at 09:00

## 2017-03-03 RX ADMIN — SILVER SULFADIAZINE SCH GM: 10 CREAM TOPICAL at 09:50

## 2017-03-03 RX ADMIN — METOPROLOL TARTRATE SCH MG: 50 TABLET, FILM COATED ORAL at 21:00

## 2017-03-03 RX ADMIN — APIXABAN SCH MG: 2.5 TABLET, FILM COATED ORAL at 16:42

## 2017-06-08 ENCOUNTER — HOSPITAL ENCOUNTER (INPATIENT)
Dept: HOSPITAL 54 - ER | Age: 57
LOS: 4 days | Discharge: NURSING, REHAB OR LONG-TERM CARE | DRG: 140 | End: 2017-06-12
Payer: COMMERCIAL

## 2017-06-08 VITALS — DIASTOLIC BLOOD PRESSURE: 71 MMHG | SYSTOLIC BLOOD PRESSURE: 143 MMHG

## 2017-06-08 VITALS — BODY MASS INDEX: 25.16 KG/M2 | HEIGHT: 63 IN | WEIGHT: 142 LBS

## 2017-06-08 DIAGNOSIS — J15.9: ICD-10-CM

## 2017-06-08 DIAGNOSIS — J44.0: Primary | ICD-10-CM

## 2017-06-08 DIAGNOSIS — Z87.891: ICD-10-CM

## 2017-06-08 DIAGNOSIS — E83.39: ICD-10-CM

## 2017-06-08 DIAGNOSIS — E87.70: ICD-10-CM

## 2017-06-08 DIAGNOSIS — D64.9: ICD-10-CM

## 2017-06-08 DIAGNOSIS — I49.9: ICD-10-CM

## 2017-06-08 DIAGNOSIS — G40.909: ICD-10-CM

## 2017-06-08 DIAGNOSIS — J98.11: ICD-10-CM

## 2017-06-08 DIAGNOSIS — I13.2: ICD-10-CM

## 2017-06-08 DIAGNOSIS — N18.6: ICD-10-CM

## 2017-06-08 DIAGNOSIS — I50.9: ICD-10-CM

## 2017-06-08 DIAGNOSIS — J96.01: ICD-10-CM

## 2017-06-08 DIAGNOSIS — E11.22: ICD-10-CM

## 2017-06-08 PROCEDURE — A6402 STERILE GAUZE <= 16 SQ IN: HCPCS

## 2017-06-08 PROCEDURE — Z7610: HCPCS

## 2017-06-08 PROCEDURE — A9567 TECHNETIUM TC-99M AEROSOL: HCPCS

## 2017-06-08 PROCEDURE — A4606 OXYGEN PROBE USED W OXIMETER: HCPCS

## 2017-06-08 PROCEDURE — A9540 TC99M MAA: HCPCS

## 2017-06-08 RX ADMIN — APIXABAN SCH MG: 2.5 TABLET, FILM COATED ORAL at 22:00

## 2017-06-08 RX ADMIN — Medication SCH EACH: at 21:48

## 2017-06-08 RX ADMIN — ATORVASTATIN CALCIUM SCH MG: 40 TABLET, FILM COATED ORAL at 22:00

## 2017-06-08 NOTE — NUR
bibpa from hd cented due to neck, left knee and both shoulders pan sp fall 3 
days ago. Pt was sent to ed during hd-- hd not completely finish. Pt is aao3 
with episode of confusion. Appears in no apparent distress, respiration even 
and unlabored. pt on o2  via nc @2lpm. vss. connected pt to tele monitor.

## 2017-06-08 NOTE — NUR
IV ATTEMPT UNSUCESSFUL, PT NOW REFUSING BLOOD WORK OR IV'S, MD MADE AWARE WILL 
CONTINUE TO MONITOR.

## 2017-06-08 NOTE — NUR
PATIENT REFUSED NATALIE PER PATIENT HE DONT WANT IT RISKS AND BENEFITS EXPLAINED OFFERED 3 
X STILL REFUSING NATALIE PHILLIP MADE AWARE

## 2017-06-08 NOTE — NUR
PT REPORT RECIVED FROM TRIP GARDNER, PT IS SLEEPING IN BED AND PT IS ON MONITOR, 
WILL CONTINUE TO MONITOR.

## 2017-06-09 VITALS — DIASTOLIC BLOOD PRESSURE: 59 MMHG | SYSTOLIC BLOOD PRESSURE: 120 MMHG

## 2017-06-09 VITALS — SYSTOLIC BLOOD PRESSURE: 133 MMHG | DIASTOLIC BLOOD PRESSURE: 71 MMHG

## 2017-06-09 VITALS — SYSTOLIC BLOOD PRESSURE: 133 MMHG | DIASTOLIC BLOOD PRESSURE: 65 MMHG

## 2017-06-09 LAB
BASOPHILS # BLD AUTO: 0 /CMM (ref 0–0.2)
BASOPHILS NFR BLD AUTO: 0 % (ref 0–2)
BUN SERPL-MCNC: 81 MG/DL (ref 7–18)
CALCIUM SERPL-MCNC: 9.1 MG/DL (ref 8.5–10.1)
CHLORIDE SERPL-SCNC: 91 MMOL/L (ref 98–107)
CO2 SERPL-SCNC: 26 MMOL/L (ref 21–32)
CREAT SERPL-MCNC: 7 MG/DL (ref 0.6–1.3)
EOSINOPHIL # BLD AUTO: 0.3 /CMM (ref 0–0.7)
EOSINOPHIL NFR BLD AUTO: 2.5 % (ref 0–6)
EOSINOPHIL NFR BLD MANUAL: 1 % (ref 0–4)
GLUCOSE SERPL-MCNC: 94 MG/DL (ref 74–106)
HCT VFR BLD AUTO: 37 % (ref 39–51)
HGB BLD-MCNC: 11.8 G/DL (ref 13.5–17.5)
LYMPHOCYTES NFR BLD AUTO: 0.9 /CMM (ref 0.8–4.8)
LYMPHOCYTES NFR BLD AUTO: 8.8 % (ref 20–44)
LYMPHOCYTES NFR BLD MANUAL: 10 % (ref 16–48)
MCH RBC QN AUTO: 30 PG (ref 26–33)
MCHC RBC AUTO-ENTMCNC: 32 G/DL (ref 31–36)
MCV RBC AUTO: 93 FL (ref 80–96)
MONOCYTES NFR BLD AUTO: 0.8 /CMM (ref 0.1–1.3)
MONOCYTES NFR BLD AUTO: 7.6 % (ref 2–12)
MONOCYTES NFR BLD MANUAL: 7 % (ref 0–11)
NEUTROPHILS # BLD AUTO: 8.5 /CMM (ref 1.8–8.9)
NEUTROPHILS NFR BLD AUTO: 81.1 % (ref 43–81)
NEUTS SEG NFR BLD MANUAL: 82 % (ref 42–76)
PLATELET # BLD AUTO: 239 /CMM (ref 150–450)
POTASSIUM SERPL-SCNC: 5 MMOL/L (ref 3.5–5.1)
RBC # BLD AUTO: 3.96 MIL/UL (ref 4.5–6)
RDW COEFFICIENT OF VARIATION: 18.8 (ref 11.5–15)
SODIUM SERPL-SCNC: 131 MMOL/L (ref 136–145)
WBC NRBC COR # BLD AUTO: 10.4 K/UL (ref 4.3–11)

## 2017-06-09 RX ADMIN — Medication SCH EACH: at 17:28

## 2017-06-09 RX ADMIN — AMLODIPINE BESYLATE SCH MG: 10 TABLET ORAL at 10:08

## 2017-06-09 RX ADMIN — HYDROCODONE BITARTRATE AND ACETAMINOPHEN PRN EA: 10; 325 TABLET ORAL at 23:30

## 2017-06-09 RX ADMIN — APIXABAN SCH MG: 2.5 TABLET, FILM COATED ORAL at 10:06

## 2017-06-09 RX ADMIN — Medication SCH MG: at 21:36

## 2017-06-09 RX ADMIN — ALBUTEROL SULFATE SCH MG: 2.5 SOLUTION RESPIRATORY (INHALATION) at 19:34

## 2017-06-09 RX ADMIN — APIXABAN SCH MG: 2.5 TABLET, FILM COATED ORAL at 17:26

## 2017-06-09 RX ADMIN — Medication SCH EACH: at 12:22

## 2017-06-09 RX ADMIN — ATORVASTATIN CALCIUM SCH MG: 40 TABLET, FILM COATED ORAL at 21:36

## 2017-06-09 RX ADMIN — Medication SCH EACH: at 21:36

## 2017-06-09 RX ADMIN — LEVETIRACETAM SCH MG: 250 TABLET, FILM COATED ORAL at 10:06

## 2017-06-09 RX ADMIN — LISINOPRIL SCH MG: 20 TABLET ORAL at 10:08

## 2017-06-09 RX ADMIN — AMIODARONE HYDROCHLORIDE SCH MG: 200 TABLET ORAL at 10:07

## 2017-06-09 RX ADMIN — Medication SCH TAB: at 10:06

## 2017-06-09 RX ADMIN — Medication SCH EACH: at 05:29

## 2017-06-09 NOTE — NUR
RN OPENING NOTES



RECEIVED PATIENT IN BED, AWAKE, HEAD OF BED ELEVATED, NO SOB OR DISTRESS NOTED,  ON O2 @ 
2LPM VIA NC AND TOLERATED WELL. A/O X 2, VERBALLY RESPONSIVE AND ABLE TO MAKE NEEDS KNOWN. 
NO IV AND MD IS AWARE. KEPT PATIENT IN BED, CALL LIGHT WITHIN PATIENT REACH. WILL CONTINUE 
TO MONITOR ACCORDINGLY.

## 2017-06-09 NOTE — NUR
NON ADMINISTRATION OF ELIQUIS AND LIPITOR 6/8/17 DUE AT 2200 PATIENT IS REFUSING MEDICATION 
AT NIGHT PER PATIENT HE DOESN'T WANT IT RISKS AND BENEFITS EXPLAINED MD MADE AWARE OFFERED 3 
TIMES AND WAS TRYING TO RE TRY BUT THE PATIENT IS STILL REFUSING MD MADE AWARE PATIENT IS 
UNCOOPERATIVE WITH HIS PLAN OF CARE

## 2017-06-09 NOTE — NUR
MS RN INITIAL NOTE



PT RECEIVED VIA [employer] TEK AT 6/8/2017 AT 2100, A/O X 3, NO S/S OF RESPIRATORY DISTRESS OR SOB. 
SAFE ENVIRONMENT PROVIDED FREE OF CLUTTERS.   BED IN LOCKED, LOW POSITION.  CALL LIGHT 
WITHIN EASY REACH.  WILL CONTINUE TO MONITOR. HEAD TO TOE ASSESSMENT PATIENT REFUSED FOR ME 
TO TAKE PICTURES OF THE SKIN ISSUES RISKS AND BENEFITS EXPLAINED OFFERED 3 TIMES MD MADE 
AWARE.

## 2017-06-09 NOTE — NUR
MS RN CLOSING NOTES



PATIENT COMFORTABLY ASLEEP AND EASILY AWAKEN, NO S/S OF DISTRESS, ON 02 2LPM VIA NC 02 SAT 
AT 96% RESPIRATIONS EVEN AND UNLABORED. NO SOB, NO COUGH, NO CONGESTION, SKIN WARM AND DRY 
TO TOUCH, AFEBRILE, ALL NURSING CARE NEEDS PROVIDED AND RENDERED, NEEDS ATTENDED AND 
ANTICIPATED,  VS STABLE,  KEPT CLEAN AND DRY AND COMFORTABLE,  FREQUENT VISUAL CHECK DONE 
FOR SAFETY EVERY 2 HOURS. SAFE HAZARD FREE ENVIRONMENT PROVIDED. CALL LIGHT WITHIN EASY TO 
REACH, ON LOW BED AT ALL TIMES TO ENSURE SAFETY, WILL ENDORSE TO THE NEXT SHIFT CONTINUE 
PLAN OF CARE. PATIENT IS UNCOOPERATIVE WITH HIS PLAN OF CARE REFUSES TO BE TOUCH AT THIS 
MOMENT RE EDUCATED THE PATIENT, MD MADE AWARE OF THE BEHAVIOR. PATIENT DOESNT TO REMOVE HIS 
PANTS FOR ME TO SKIN CHECK RISKS AND BENEFITS EXPLAINED STILL REFUSES MD IS AWARE.

## 2017-06-09 NOTE — NUR
MS RN NOTES



PATIENT HAS NO IV AT THIS TIME, OFFERED 3 TIMES PER PATIENT HE DOESNT LIKE IT AND HE DOESNT 
WANT TO BE BOTHERED. MD MADE AWARE. RISKS AND BENEFITS EXPLAINED.

## 2017-06-09 NOTE — NUR
MS RN NOTES



PATIENT WAS ASKED TO REMOVED HIS PANTS TO CHECK THE IF THERE IS ANY WOUNDS THAT WE CAN 
TREAT, PATIENT REFUSES TO BE TOUCH AND DOESN'T WANT TO REMOVE HIS PANTS RISKS AND BENEFITS 
EXPLAINED EDUCATED PATIENT MD MADE AWARE

## 2017-06-09 NOTE — NUR
RN NOTE



RECEIVED REPORT. PT SITTING UP IN BED, AAOX4. NO S/S OF RESPIRATORY DISTRESS, BREATHING 
NON-LABORED AND EVEN. NO C/O PAIN/DISCOMFORT. IV INTACT AND PATENT. CALL LIGHT IN REACH - 
WILL CONT TO MONITOR.

## 2017-06-09 NOTE — NUR
RN CLOSING NOTES



ALL NEEDS PROVIDED, ATTENDED, AND ANTICIPATED. KEPT PATIENT CLEAN AND COMFORTABLE IN BED. 
CALL LIGHT WITHIN PATIENT REACH, WILL CONTINUE TO MONITOR ACCORDINGLY. ENDORSED TO NEXT 
SHIFT RN TO CONTINUE CARE

## 2017-06-09 NOTE — NUR
MS RN NOTES



PATIENT REFUSED ACCUCHECK AT THIS TIME PATIENT NON COMPLIANT HIS TREATMENT EXPLAINED AGAIN 
RISKS AND BENEFITS OFFERED 3 TIMES PATIENT STILL REFUSING MD MADE AWARE.

## 2017-06-10 VITALS — DIASTOLIC BLOOD PRESSURE: 72 MMHG | SYSTOLIC BLOOD PRESSURE: 130 MMHG

## 2017-06-10 VITALS — SYSTOLIC BLOOD PRESSURE: 126 MMHG | DIASTOLIC BLOOD PRESSURE: 62 MMHG

## 2017-06-10 VITALS — SYSTOLIC BLOOD PRESSURE: 129 MMHG | DIASTOLIC BLOOD PRESSURE: 61 MMHG

## 2017-06-10 LAB
BASOPHILS # BLD AUTO: 0 /CMM (ref 0–0.2)
BASOPHILS NFR BLD AUTO: 0 % (ref 0–2)
BUN SERPL-MCNC: 94 MG/DL (ref 7–18)
CALCIUM SERPL-MCNC: 9.2 MG/DL (ref 8.5–10.1)
CHLORIDE SERPL-SCNC: 91 MMOL/L (ref 98–107)
CO2 SERPL-SCNC: 29 MMOL/L (ref 21–32)
CREAT SERPL-MCNC: 8.1 MG/DL (ref 0.6–1.3)
EOSINOPHIL # BLD AUTO: 0.3 /CMM (ref 0–0.7)
EOSINOPHIL NFR BLD AUTO: 3 % (ref 0–6)
GLUCOSE SERPL-MCNC: 87 MG/DL (ref 74–106)
HCT VFR BLD AUTO: 33 % (ref 39–51)
HGB BLD-MCNC: 10.7 G/DL (ref 13.5–17.5)
LYMPHOCYTES NFR BLD AUTO: 0.9 /CMM (ref 0.8–4.8)
LYMPHOCYTES NFR BLD AUTO: 8.8 % (ref 20–44)
MAGNESIUM SERPL-MCNC: 2 MG/DL (ref 1.8–2.4)
MCH RBC QN AUTO: 30 PG (ref 26–33)
MCHC RBC AUTO-ENTMCNC: 32 G/DL (ref 31–36)
MCV RBC AUTO: 93 FL (ref 80–96)
MONOCYTES NFR BLD AUTO: 0.9 /CMM (ref 0.1–1.3)
MONOCYTES NFR BLD AUTO: 9.6 % (ref 2–12)
NEUTROPHILS # BLD AUTO: 7.7 /CMM (ref 1.8–8.9)
NEUTROPHILS NFR BLD AUTO: 78.6 % (ref 43–81)
PHOSPHATE SERPL-MCNC: 7.8 MG/DL (ref 2.5–4.9)
PLATELET # BLD AUTO: 217 /CMM (ref 150–450)
POTASSIUM SERPL-SCNC: 5.3 MMOL/L (ref 3.5–5.1)
RBC # BLD AUTO: 3.59 MIL/UL (ref 4.5–6)
RDW COEFFICIENT OF VARIATION: 19.1 (ref 11.5–15)
SODIUM SERPL-SCNC: 133 MMOL/L (ref 136–145)
WBC NRBC COR # BLD AUTO: 9.8 K/UL (ref 4.3–11)

## 2017-06-10 PROCEDURE — 5A1D60Z: ICD-10-PCS | Performed by: INTERNAL MEDICINE

## 2017-06-10 RX ADMIN — MUPIROCIN SCH GM: 20 OINTMENT TOPICAL at 12:50

## 2017-06-10 RX ADMIN — AMLODIPINE BESYLATE SCH MG: 10 TABLET ORAL at 09:12

## 2017-06-10 RX ADMIN — ALBUTEROL SULFATE SCH MG: 2.5 SOLUTION RESPIRATORY (INHALATION) at 13:01

## 2017-06-10 RX ADMIN — Medication SCH MG: at 22:08

## 2017-06-10 RX ADMIN — ALBUTEROL SULFATE SCH MG: 2.5 SOLUTION RESPIRATORY (INHALATION) at 08:09

## 2017-06-10 RX ADMIN — Medication PRN MG: at 08:09

## 2017-06-10 RX ADMIN — ALBUTEROL SULFATE SCH MG: 2.5 SOLUTION RESPIRATORY (INHALATION) at 01:30

## 2017-06-10 RX ADMIN — Medication SCH TAB: at 09:10

## 2017-06-10 RX ADMIN — AMIODARONE HYDROCHLORIDE SCH MG: 200 TABLET ORAL at 09:12

## 2017-06-10 RX ADMIN — LISINOPRIL SCH MG: 20 TABLET ORAL at 09:11

## 2017-06-10 RX ADMIN — Medication SCH EACH: at 22:00

## 2017-06-10 RX ADMIN — HYDROCODONE BITARTRATE AND ACETAMINOPHEN PRN EA: 10; 325 TABLET ORAL at 06:11

## 2017-06-10 RX ADMIN — Medication PRN MG: at 13:01

## 2017-06-10 RX ADMIN — MUPIROCIN SCH GM: 20 OINTMENT TOPICAL at 22:09

## 2017-06-10 RX ADMIN — RENAGEL SCH MG: 800 TABLET ORAL at 17:11

## 2017-06-10 RX ADMIN — ATORVASTATIN CALCIUM SCH MG: 40 TABLET, FILM COATED ORAL at 22:08

## 2017-06-10 RX ADMIN — Medication SCH EACH: at 12:00

## 2017-06-10 RX ADMIN — Medication SCH EACH: at 17:12

## 2017-06-10 RX ADMIN — Medication SCH EACH: at 06:14

## 2017-06-10 RX ADMIN — ALBUTEROL SULFATE SCH MG: 2.5 SOLUTION RESPIRATORY (INHALATION) at 20:01

## 2017-06-10 RX ADMIN — LEVETIRACETAM SCH MG: 250 TABLET, FILM COATED ORAL at 09:10

## 2017-06-10 RX ADMIN — APIXABAN SCH MG: 2.5 TABLET, FILM COATED ORAL at 09:13

## 2017-06-10 RX ADMIN — APIXABAN SCH MG: 2.5 TABLET, FILM COATED ORAL at 17:11

## 2017-06-10 RX ADMIN — RENAGEL SCH MG: 800 TABLET ORAL at 12:50

## 2017-06-10 RX ADMIN — HYDROCODONE BITARTRATE AND ACETAMINOPHEN PRN EA: 10; 325 TABLET ORAL at 17:11

## 2017-06-10 NOTE — NUR
RN OPENING NOTES



RECEIVED PATIENT IN BED, AWAKE, HEAD OF BED ELEVATED, NO SOB OR DISTRESS NOTED,  ON O2 @ 
2LPM VIA NC AND TOLERATED WELL. A/O X 3, VERBALLY RESPONSIVE AND ABLE TO MAKE NEEDS KNOWN. 
NO IV AND MD IS AWARE. KEPT PATIENT IN BED, CALL LIGHT WITHIN PATIENT REACH. WILL CONTINUE 
TO MONITOR ACCORDINGLY

-------------------------------------------------------------------------------

Addendum: 06/10/17 at 3006 by MIC CARPIO RN

-------------------------------------------------------------------------------

IV INTACT AND PATENT.

## 2017-06-10 NOTE — NUR
RN NOTE



NO SIGNIFICANT CHANGES THIS SHIFT. PT APPEARS COMFORTABLE IN BED, NO S/S OF ANY DITRESS. 
BREATHING EVEN AND NON-LABORED ON NC 2L. ALL NEEDS ATTENDED TO, CALL LIGHT IN REACH. WILL 
F/U WITH DAY SHIFT FOR JUANITA.

## 2017-06-10 NOTE — NUR
RN NOTE



RECEIVED REPORT. PT SITTING UP IN BED, AAOX4. NO S/S OF RESPIRATORY DISTRESS AT THIS TIME, 
BREATHING NON-LABORED AND EVEN. NO C/O PAIN/DISCOMFORT. IV INTACT AND PATENT. CALL LIGHT IN 
REACH - WILL CONT TO MONITOR.

## 2017-06-11 VITALS — DIASTOLIC BLOOD PRESSURE: 73 MMHG | SYSTOLIC BLOOD PRESSURE: 132 MMHG

## 2017-06-11 VITALS — DIASTOLIC BLOOD PRESSURE: 60 MMHG | SYSTOLIC BLOOD PRESSURE: 111 MMHG

## 2017-06-11 VITALS — DIASTOLIC BLOOD PRESSURE: 41 MMHG | SYSTOLIC BLOOD PRESSURE: 140 MMHG

## 2017-06-11 LAB
BASOPHILS # BLD AUTO: 0 /CMM (ref 0–0.2)
BASOPHILS NFR BLD AUTO: 0.3 % (ref 0–2)
BUN SERPL-MCNC: 64 MG/DL (ref 7–18)
CALCIUM SERPL-MCNC: 9.1 MG/DL (ref 8.5–10.1)
CHLORIDE SERPL-SCNC: 93 MMOL/L (ref 98–107)
CO2 SERPL-SCNC: 31 MMOL/L (ref 21–32)
CREAT SERPL-MCNC: 6.8 MG/DL (ref 0.6–1.3)
EOSINOPHIL # BLD AUTO: 0.4 /CMM (ref 0–0.7)
EOSINOPHIL NFR BLD AUTO: 4.3 % (ref 0–6)
GLUCOSE SERPL-MCNC: 115 MG/DL (ref 74–106)
HCT VFR BLD AUTO: 32 % (ref 39–51)
HGB BLD-MCNC: 10 G/DL (ref 13.5–17.5)
LYMPHOCYTES NFR BLD AUTO: 0.8 /CMM (ref 0.8–4.8)
LYMPHOCYTES NFR BLD AUTO: 8.8 % (ref 20–44)
MAGNESIUM SERPL-MCNC: 1.8 MG/DL (ref 1.8–2.4)
MCH RBC QN AUTO: 30 PG (ref 26–33)
MCHC RBC AUTO-ENTMCNC: 32 G/DL (ref 31–36)
MCV RBC AUTO: 94 FL (ref 80–96)
MONOCYTES NFR BLD AUTO: 1.1 /CMM (ref 0.1–1.3)
MONOCYTES NFR BLD AUTO: 12.6 % (ref 2–12)
NEUTROPHILS # BLD AUTO: 6.8 /CMM (ref 1.8–8.9)
NEUTROPHILS NFR BLD AUTO: 74 % (ref 43–81)
PHOSPHATE SERPL-MCNC: 6.5 MG/DL (ref 2.5–4.9)
PLATELET # BLD AUTO: 202 /CMM (ref 150–450)
POTASSIUM SERPL-SCNC: 4.7 MMOL/L (ref 3.5–5.1)
RBC # BLD AUTO: 3.39 MIL/UL (ref 4.5–6)
RDW COEFFICIENT OF VARIATION: 17.9 (ref 11.5–15)
SODIUM SERPL-SCNC: 133 MMOL/L (ref 136–145)
WBC NRBC COR # BLD AUTO: 9.1 K/UL (ref 4.3–11)

## 2017-06-11 RX ADMIN — Medication SCH MG: at 21:12

## 2017-06-11 RX ADMIN — ATORVASTATIN CALCIUM SCH MG: 40 TABLET, FILM COATED ORAL at 21:12

## 2017-06-11 RX ADMIN — Medication SCH EACH: at 17:13

## 2017-06-11 RX ADMIN — APIXABAN SCH MG: 2.5 TABLET, FILM COATED ORAL at 08:32

## 2017-06-11 RX ADMIN — APIXABAN SCH MG: 2.5 TABLET, FILM COATED ORAL at 20:22

## 2017-06-11 RX ADMIN — RENAGEL SCH MG: 800 TABLET ORAL at 12:41

## 2017-06-11 RX ADMIN — ALBUTEROL SULFATE SCH MG: 2.5 SOLUTION RESPIRATORY (INHALATION) at 08:29

## 2017-06-11 RX ADMIN — RENAGEL SCH MG: 800 TABLET ORAL at 20:22

## 2017-06-11 RX ADMIN — Medication SCH EACH: at 07:00

## 2017-06-11 RX ADMIN — Medication PRN EACH: at 20:22

## 2017-06-11 RX ADMIN — Medication SCH EACH: at 21:17

## 2017-06-11 RX ADMIN — LEVETIRACETAM SCH MG: 250 TABLET, FILM COATED ORAL at 08:31

## 2017-06-11 RX ADMIN — ALBUTEROL SULFATE SCH MG: 2.5 SOLUTION RESPIRATORY (INHALATION) at 21:08

## 2017-06-11 RX ADMIN — Medication PRN MG: at 02:19

## 2017-06-11 RX ADMIN — LISINOPRIL SCH MG: 20 TABLET ORAL at 08:31

## 2017-06-11 RX ADMIN — MUPIROCIN SCH APPLIC: 20 OINTMENT TOPICAL at 20:25

## 2017-06-11 RX ADMIN — ALBUTEROL SULFATE SCH MG: 2.5 SOLUTION RESPIRATORY (INHALATION) at 02:24

## 2017-06-11 RX ADMIN — Medication SCH TAB: at 08:31

## 2017-06-11 RX ADMIN — AMLODIPINE BESYLATE SCH MG: 10 TABLET ORAL at 08:31

## 2017-06-11 RX ADMIN — RENAGEL SCH MG: 800 TABLET ORAL at 08:32

## 2017-06-11 RX ADMIN — ALBUTEROL SULFATE SCH MG: 2.5 SOLUTION RESPIRATORY (INHALATION) at 13:20

## 2017-06-11 RX ADMIN — MUPIROCIN SCH APPLIC: 20 OINTMENT TOPICAL at 08:35

## 2017-06-11 RX ADMIN — Medication PRN EACH: at 12:44

## 2017-06-11 RX ADMIN — Medication SCH EACH: at 12:44

## 2017-06-11 RX ADMIN — AMIODARONE HYDROCHLORIDE SCH MG: 200 TABLET ORAL at 08:32

## 2017-06-11 NOTE — NUR
MS RN NOTES:



PT REFUSED TO GET HIS BLOOD SUGAR CHECKED. IN HIS WORDS " I DON'T NEED IT." EXPLAINED TO PT 
THE IMPORTANT OF GETTING HIS BLOOD SUGAR CHECKED BUT STILL REFUSED. WILL CONTINUE TO MONITOR 
PT.

## 2017-06-11 NOTE — NUR
MS GARDNER NOTES:



DIALYSIS NURSE INFORMED ME THAT THE OUTPUT WAS 2500ML. VITAL SIGNS 140/41 HR 63 AND TEMP 
98.6 ; WILL CONTINUE TO MONITOR PT.

-------------------------------------------------------------------------------

Addendum: 06/12/17 at 0435 by GILES ORTEGA RN

-------------------------------------------------------------------------------

OUTPUT WAS 2200ML.

## 2017-06-11 NOTE — NUR
RN NOTE



NO SIGNIFICANT CHANGES T/O NIGHT SHIFT. PT RESTING IN BED WITH EYES CLOSED. NO S/S OF ANY 
DISTRESS AT THIS TIME. ON NC 3L. NO SOB NOTED.CALL LIGHT IN REACH, WILL F/U WITH DAY SHIFT 
FOR JUANITA.

## 2017-06-11 NOTE — NUR
PT REFUSING BS CHECK, DESPITE TEACHING AND ENCOUARGEMENT X3. NO S/S OF HYPO/HYPERGLYCEMIA. 
WILL MONITOR.

## 2017-06-11 NOTE — NUR
MS RN CLOSING



PATIENT STABLE NO COMPLICATIONS. HD RUNNING AT THIS TIME. RN BILL AT BEDSIDE. ALL DUE MEDS 
GIVEN AND ALL NEEDS MET. CALL LIGHT IN REACH, BED LOWERED AND LOCKED, RAILS UPX3 FOR SAFETY 
AND WILL ENDORSE TO RN FOR JUANITA

## 2017-06-11 NOTE — NUR
MS RN OPENING



RECEIVED PATIENT A/OX3 Luxembourgish SPEAKING ASSISTED WITH TRANSLATION BY CNA. PATIENT IS NOT 
WANTING TO TALK MUCH AND WILL NOT RESPOND TO MOST QUESTIONS. NO S/S PAIN, SOB, DIFFICULTY 
BREATHING. PATIENT REFUSED AM LABS AND ATTEMPTED TO TALK WITH PATIENT BUT HE IS PASSIVE. 
WILL TRY AGAIN. PATIENT WITH NEEDS IN REACH, BED LOWERED AND LOCKED, RAILS UPX3 FOR SAFETY 
AND WILL ROUND Q2H OR LESS PER NEEDS.

## 2017-06-11 NOTE — NUR
MS RN NOTES:



PT WAS FOUND WITH HIS R WRIST #20G DISLODGED. WAS OFFERED TO PUT ANOTHER IV IN BUT PT 
REFUSED AND DOES NOT WANT IT. EXPLAINED TO HIM THAT HE MAY NEED IT BUT REFUSES. PT ALSO 
REFUSES FOR SKIN ASSESSMENT/PHOTOS TO BE TAKEN. WILL CONTINUE TO MONITOR AND ATTEMPT AT 
ANOTHER TIME.

## 2017-06-11 NOTE — NUR
MS RN OPENING NOTES:



RECEIVED PT IN BED AND IS AWAKE AND A/OX3 AND IS Citizen of [place] SPEAKING ONLY. PT IS ON 2LPM VIA NC 
AND IS TOLERATING WELL. PT IS CURRENTLY RECEIVING HEMODIALYSIS RIGHT NOW. HD NURSE AT 
BEDSIDE. PT VOICED THAT HE HAS 9/10 PAIN ON HIS R SHOULDER. CALL LIGHT WITHIN PT'S REACH. 
BED KEPT IN LOCKED, LOWEST POSITION AND SIDE RAILS X 2 UP. PT HAS R WRIST 20G AND IS PATENT 
AND INTACT. WILL CONTINUE TO MONITOR PT.

## 2017-06-11 NOTE — NUR
MS RN NOTES:



LATE ADMIN FOR ELIQUIS AND RENAGEL DUE TO PT GETTING DIALYSIS. PT WAS OFFERED FOOD BUT ONLY 
WANTED JELLO. WILL CONTINUE TO MONITOR PT.

## 2017-06-12 VITALS — DIASTOLIC BLOOD PRESSURE: 66 MMHG | SYSTOLIC BLOOD PRESSURE: 132 MMHG

## 2017-06-12 VITALS — SYSTOLIC BLOOD PRESSURE: 115 MMHG | DIASTOLIC BLOOD PRESSURE: 69 MMHG

## 2017-06-12 LAB
BASOPHILS # BLD AUTO: 0 /CMM (ref 0–0.2)
BASOPHILS NFR BLD AUTO: 0 % (ref 0–2)
BUN SERPL-MCNC: 39 MG/DL (ref 7–18)
CALCIUM SERPL-MCNC: 9.2 MG/DL (ref 8.5–10.1)
CHLORIDE SERPL-SCNC: 99 MMOL/L (ref 98–107)
CO2 SERPL-SCNC: 29 MMOL/L (ref 21–32)
CREAT SERPL-MCNC: 5 MG/DL (ref 0.6–1.3)
EOSINOPHIL # BLD AUTO: 0.5 /CMM (ref 0–0.7)
EOSINOPHIL NFR BLD AUTO: 4 % (ref 0–6)
GLUCOSE SERPL-MCNC: 127 MG/DL (ref 74–106)
HCT VFR BLD AUTO: 31 % (ref 39–51)
HGB BLD-MCNC: 9.6 G/DL (ref 13.5–17.5)
LYMPHOCYTES NFR BLD AUTO: 0.9 /CMM (ref 0.8–4.8)
LYMPHOCYTES NFR BLD AUTO: 7.6 % (ref 20–44)
MAGNESIUM SERPL-MCNC: 1.8 MG/DL (ref 1.8–2.4)
MCH RBC QN AUTO: 29 PG (ref 26–33)
MCHC RBC AUTO-ENTMCNC: 31 G/DL (ref 31–36)
MCV RBC AUTO: 94 FL (ref 80–96)
MONOCYTES NFR BLD AUTO: 1.1 /CMM (ref 0.1–1.3)
MONOCYTES NFR BLD AUTO: 9.4 % (ref 2–12)
NEUTROPHILS # BLD AUTO: 9.4 /CMM (ref 1.8–8.9)
NEUTROPHILS NFR BLD AUTO: 79 % (ref 43–81)
PHOSPHATE SERPL-MCNC: 3.8 MG/DL (ref 2.5–4.9)
PLATELET # BLD AUTO: 170 /CMM (ref 150–450)
POTASSIUM SERPL-SCNC: 4.7 MMOL/L (ref 3.5–5.1)
RBC # BLD AUTO: 3.29 MIL/UL (ref 4.5–6)
RDW COEFFICIENT OF VARIATION: 18.9 (ref 11.5–15)
SODIUM SERPL-SCNC: 135 MMOL/L (ref 136–145)
WBC NRBC COR # BLD AUTO: 11.9 K/UL (ref 4.3–11)

## 2017-06-12 RX ADMIN — HYDROCODONE BITARTRATE AND ACETAMINOPHEN PRN EA: 10; 325 TABLET ORAL at 08:47

## 2017-06-12 RX ADMIN — Medication PRN MG: at 08:51

## 2017-06-12 RX ADMIN — AMLODIPINE BESYLATE SCH MG: 10 TABLET ORAL at 09:56

## 2017-06-12 RX ADMIN — Medication SCH EACH: at 12:00

## 2017-06-12 RX ADMIN — ALBUTEROL SULFATE SCH MG: 2.5 SOLUTION RESPIRATORY (INHALATION) at 02:24

## 2017-06-12 RX ADMIN — LEVETIRACETAM SCH MG: 250 TABLET, FILM COATED ORAL at 08:34

## 2017-06-12 RX ADMIN — Medication SCH EACH: at 06:11

## 2017-06-12 RX ADMIN — RENAGEL SCH MG: 800 TABLET ORAL at 08:34

## 2017-06-12 RX ADMIN — AMIODARONE HYDROCHLORIDE SCH MG: 200 TABLET ORAL at 08:35

## 2017-06-12 RX ADMIN — ALBUTEROL SULFATE SCH MG: 2.5 SOLUTION RESPIRATORY (INHALATION) at 08:51

## 2017-06-12 RX ADMIN — ALBUTEROL SULFATE SCH MG: 2.5 SOLUTION RESPIRATORY (INHALATION) at 07:35

## 2017-06-12 RX ADMIN — APIXABAN SCH MG: 2.5 TABLET, FILM COATED ORAL at 08:34

## 2017-06-12 RX ADMIN — LISINOPRIL SCH MG: 20 TABLET ORAL at 09:56

## 2017-06-12 RX ADMIN — MUPIROCIN SCH APPLIC: 20 OINTMENT TOPICAL at 08:48

## 2017-06-12 RX ADMIN — Medication SCH TAB: at 08:34

## 2017-06-12 NOTE — NUR
MS RN NOTES:



PT WAS OFFERED TO HAVE AN IV STARTED ON HIM BUT PT REFUSES. WILL ENDORSE TO AM NURSE.

## 2017-06-12 NOTE — NUR
MS RN CLOSING NOTES:



ALL NEEDS WERE ATTENDED AND ANTICIPATED FOR. PT REFUSED ACCUCHECKS, TO HAVE A NEW IV 
STARTED, TO BE GIVEN A BED BATH, A CLEAN GOWN, AND ALSO LABS FOR THIS MORNING. PT HAS L 
CHEST AV SHUNT. PT REFUSES FOR SKIN ASSESSMENT AS WELL. PT IS ON 2LPM VIA NC AND IS 
TOLERATING WELL. CALL LIGHT WITHIN PT'S REACH. BED KEPT IN LOCKED, LOWEST POSITION, AND SIDE 
RAILS X 2 UP. ENDORSED TO AM NURSE FOR JUANITA.

## 2017-06-12 NOTE — NUR
MS RN NOTES



CALLED [facility] AND CONFIRMED PATIENT HAS A BED AVAILABLE. CALLED MED RESPONSE AND 
SCHEDULED  FOR 1200

## 2017-06-12 NOTE — NUR
MS RN OPENING



RECEIVED PATIENT A/OX3 Mongolian SPEAKING ASSISTED WITH TRANSLATION BY CNA. NO S/S PAIN, SOB, 
DIFFICULTY BREATHING. PATIENT REFUSED AM LABS AND ATTEMPTED TO TALK WITH PATIENT BUT HE IS 
PASSIVE. WILL TRY AGAIN. PATIENT WITH NEEDS IN REACH, BED LOWERED AND LOCKED, RAILS UPX3 FOR 
SAFETY AND WILL ROUND Q2H OR LESS PER NEEDS.

## 2017-06-12 NOTE — NUR
MS RN NOTES:



PT WAS OFFERED FOR A CHANGE OF CLEAN GOWN, AND A BED/SPONGE BATH BUT PT REFUSED. PT IS ALSO 
REFUSING BLOOD SUGAR CHECK FOR THIS MORNING. PT WAS EXPLAINED THE RISK AND BENEFITS OF 
GETTING HIS BLOOD SUGAR CHECKED X3. NO S/S OF HYPO/HYPERGLYCEMIA. WILL  CONTINUE TO MONITOR 
PT.

## 2017-06-12 NOTE — NUR
MS RN DICHARGE



PATIENT STABLE NO COMPLICATIONS NO CHANGES. PATIENT EDUCATED AND SIGNED DC PAPERWORK AND 
BELONGINGS ALL ACCOUNTED FOR. CALLED REPORT TO TRIP GARAY AT [facility] AND ENDORSED CARE TO EMT. 
PATIENT ASSISTED TO ABIGAIL AND LEFT IN STABLE CONDITION NO COMPLICATIONS. DC PAPERWORK GIVEN 
TO EMT

## 2017-12-18 ENCOUNTER — HOSPITAL ENCOUNTER (INPATIENT)
Age: 57
LOS: 36 days | Discharge: NURSING, REHAB OR LONG-TERM CARE | DRG: 4 | End: 2018-01-23

## 2017-12-18 ENCOUNTER — HOSPITAL ENCOUNTER (INPATIENT)
Dept: HOSPITAL 91 - TEL | Age: 57
LOS: 36 days | Discharge: NURSING, REHAB OR LONG-TERM CARE | DRG: 4 | End: 2018-01-23
Payer: COMMERCIAL

## 2017-12-18 DIAGNOSIS — R65.21: ICD-10-CM

## 2017-12-18 DIAGNOSIS — J95.02: ICD-10-CM

## 2017-12-18 DIAGNOSIS — Z99.2: ICD-10-CM

## 2017-12-18 DIAGNOSIS — I13.2: ICD-10-CM

## 2017-12-18 DIAGNOSIS — G92: ICD-10-CM

## 2017-12-18 DIAGNOSIS — Z22.322: ICD-10-CM

## 2017-12-18 DIAGNOSIS — E87.1: ICD-10-CM

## 2017-12-18 DIAGNOSIS — E87.2: ICD-10-CM

## 2017-12-18 DIAGNOSIS — D64.9: ICD-10-CM

## 2017-12-18 DIAGNOSIS — G40.909: ICD-10-CM

## 2017-12-18 DIAGNOSIS — N18.6: ICD-10-CM

## 2017-12-18 DIAGNOSIS — A41.81: ICD-10-CM

## 2017-12-18 DIAGNOSIS — J44.9: ICD-10-CM

## 2017-12-18 DIAGNOSIS — J18.9: ICD-10-CM

## 2017-12-18 DIAGNOSIS — I50.9: ICD-10-CM

## 2017-12-18 DIAGNOSIS — J96.02: ICD-10-CM

## 2017-12-18 DIAGNOSIS — A41.1: Primary | ICD-10-CM

## 2017-12-18 LAB
ADD MAN DIFF?: NO
ALANINE AMINOTRANSFERASE: 37 IU/L (ref 13–69)
ALBUMIN/GLOBULIN RATIO: 0.68
ALBUMIN: 3.5 G/DL (ref 3.3–4.9)
ALKALINE PHOSPHATASE: 721 IU/L (ref 42–121)
AMMONIA: 20 UMOL/L (ref 9–30)
ANION GAP: 17 (ref 8–16)
ARTERIAL BASE EXCESS: -5.7 MMOL/L (ref -3–3)
ARTERIAL BASE EXCESS: -8 MMOL/L (ref -3–3)
ARTERIAL BLOOD GAS OXYGEN SAT: 98.8 MMHG (ref 95–98)
ARTERIAL BLOOD GAS OXYGEN SAT: 99.5 MMHG (ref 95–98)
ARTERIAL COHB: 0.8 % (ref 0–3)
ARTERIAL COHB: 0.9 % (ref 0–3)
ARTERIAL FRACTION OF OXYHGB: 97.6 % (ref 93–99)
ARTERIAL FRACTION OF OXYHGB: 98.4 % (ref 93–99)
ARTERIAL HCO3: 22 MMOL/L (ref 22–26)
ARTERIAL HCO3: 25.1 MMOL/L (ref 22–26)
ARTERIAL METHB: 0.3 % (ref 0–1.5)
ARTERIAL METHB: 0.3 % (ref 0–1.5)
ARTERIAL PCO2: 101.2 MMHG (ref 35–45)
ARTERIAL PCO2: 53 MMHG (ref 35–45)
ARTERIAL TOTAL HEMGLOBIN: 12.2 G/DL (ref 12–18)
ARTERIAL TOTAL HEMGLOBIN: 12.5 G/DL (ref 12–18)
ASPARTATE AMINO TRANSFERASE: 38 IU/L (ref 15–46)
BASOPHIL #: 0.1 10^3/UL (ref 0–0.1)
BASOPHILS %: 0.5 % (ref 0–2)
BILIRUBIN,DIRECT: 0 MG/DL (ref 0–0.2)
BILIRUBIN,TOTAL: 0 MG/DL (ref 0.2–1.3)
BLOOD GAS LOW PEEP SETTING: 5 CMH2O
BLOOD GAS TIDAL VOLUME: 500 ML
BLOOD UREA NITROGEN: 82 MG/DL (ref 7–20)
CALCIUM: 8.8 MG/DL (ref 8.4–10.2)
CARBON DIOXIDE: 25 MMOL/L (ref 21–31)
CHLORIDE: 96 MMOL/L (ref 97–110)
CREATININE: 4.47 MG/DL (ref 0.61–1.24)
EOSINOPHILS #: 0.3 10^3/UL (ref 0–0.5)
EOSINOPHILS %: 2.5 % (ref 0–7)
FIO2: 100 %
FIO2: 100 %
FREE THYROXINE INDEX (CALC): 1.76 UG/ML (ref 0.65–3.89)
GLOBULIN: 5.1 G/DL (ref 1.3–3.2)
GLUCOSE: 121 MG/DL (ref 70–220)
HEMATOCRIT: 38.2 % (ref 42–52)
HEMOGLOBIN: 11.4 G/DL (ref 14–18)
INR: 1.09
LACTIC ACID: 0.6 MMOL/L (ref 0.5–2)
LACTIC ACID: 0.6 MMOL/L (ref 0.5–2)
LACTIC ACID: 0.8 MMOL/L (ref 0.5–2)
LYMPHOCYTES #: 1 10^3/UL (ref 0.8–2.9)
LYMPHOCYTES %: 9.1 % (ref 15–51)
MEAN CORPUSCULAR HEMOGLOBIN: 30.5 PG (ref 29–33)
MEAN CORPUSCULAR HGB CONC: 29.8 G/DL (ref 32–37)
MEAN CORPUSCULAR VOLUME: 102.1 FL (ref 82–101)
MEAN PLATELET VOLUME: 12 FL (ref 7.4–10.4)
MODE: (no result)
MODE: (no result)
MONOCYTE #: 0.7 10^3/UL (ref 0.3–0.9)
MONOCYTES %: 6.9 % (ref 0–11)
NEUTROPHIL #: 8.5 10^3/UL (ref 1.6–7.5)
NEUTROPHILS %: 80 % (ref 39–77)
NUCLEATED RED BLOOD CELLS #: 0.1 10^3/UL (ref 0–0)
NUCLEATED RED BLOOD CELLS%: 0.8 /100WBC (ref 0–0)
O2 A-A PPRESDIFF RESPIRATORY: 424.4 MMHG (ref 7–24)
O2 A-A PPRESDIFF RESPIRATORY: 435.8 MMHG (ref 7–24)
PARTIAL THROMBOPLASTIN TIME: > 180 SEC (ref 25–35)
PLATELET COUNT: 228 10^3/UL (ref 140–415)
POTASSIUM: 4.3 MMOL/L (ref 3.5–5.1)
PROTIME: 14.2 SEC (ref 11.9–14.9)
PT RATIO: 1.1
RED BLOOD COUNT: 3.74 10^6/UL (ref 4.7–6.1)
RED CELL DISTRIBUTION WIDTH: 18.5 % (ref 11.5–14.5)
SODIUM: 134 MMOL/L (ref 135–144)
T3 UPTAKE: 36 % (ref 23.5–40.5)
T4 (THYROXINE): 4.9 UG/DL (ref 5.5–11)
TOTAL PROTEIN: 8.6 G/DL (ref 6.1–8.1)
TROPONIN-I: 0.09 NG/ML (ref 0–0.12)
WHITE BLOOD COUNT: 10.7 10^3/UL (ref 4.8–10.8)

## 2017-12-18 PROCEDURE — 70450 CT HEAD/BRAIN W/O DYE: CPT

## 2017-12-18 PROCEDURE — 93005 ELECTROCARDIOGRAM TRACING: CPT

## 2017-12-18 PROCEDURE — 36600 WITHDRAWAL OF ARTERIAL BLOOD: CPT

## 2017-12-18 PROCEDURE — 87340 HEPATITIS B SURFACE AG IA: CPT

## 2017-12-18 PROCEDURE — 96365 THER/PROPH/DIAG IV INF INIT: CPT

## 2017-12-18 PROCEDURE — 90935 HEMODIALYSIS ONE EVALUATION: CPT

## 2017-12-18 PROCEDURE — 87070 CULTURE OTHR SPECIMN AEROBIC: CPT

## 2017-12-18 PROCEDURE — 36415 COLL VENOUS BLD VENIPUNCTURE: CPT

## 2017-12-18 PROCEDURE — 86706 HEP B SURFACE ANTIBODY: CPT

## 2017-12-18 PROCEDURE — 71250 CT THORAX DX C-: CPT

## 2017-12-18 PROCEDURE — 83605 ASSAY OF LACTIC ACID: CPT

## 2017-12-18 PROCEDURE — 80053 COMPREHEN METABOLIC PANEL: CPT

## 2017-12-18 PROCEDURE — 85610 PROTHROMBIN TIME: CPT

## 2017-12-18 PROCEDURE — 76937 US GUIDE VASCULAR ACCESS: CPT

## 2017-12-18 PROCEDURE — 94664 DEMO&/EVAL PT USE INHALER: CPT

## 2017-12-18 PROCEDURE — 82803 BLOOD GASES ANY COMBINATION: CPT

## 2017-12-18 PROCEDURE — 84436 ASSAY OF TOTAL THYROXINE: CPT

## 2017-12-18 PROCEDURE — 83036 HEMOGLOBIN GLYCOSYLATED A1C: CPT

## 2017-12-18 PROCEDURE — 84479 ASSAY OF THYROID (T3 OR T4): CPT

## 2017-12-18 PROCEDURE — 82140 ASSAY OF AMMONIA: CPT

## 2017-12-18 PROCEDURE — 31500 INSERT EMERGENCY AIRWAY: CPT

## 2017-12-18 PROCEDURE — 85670 THROMBIN TIME PLASMA: CPT

## 2017-12-18 PROCEDURE — 82962 GLUCOSE BLOOD TEST: CPT

## 2017-12-18 PROCEDURE — 99291 CRITICAL CARE FIRST HOUR: CPT

## 2017-12-18 PROCEDURE — 80202 ASSAY OF VANCOMYCIN: CPT

## 2017-12-18 PROCEDURE — 94770: CPT

## 2017-12-18 PROCEDURE — 94640 AIRWAY INHALATION TREATMENT: CPT

## 2017-12-18 PROCEDURE — 71045 X-RAY EXAM CHEST 1 VIEW: CPT

## 2017-12-18 PROCEDURE — 96375 TX/PRO/DX INJ NEW DRUG ADDON: CPT

## 2017-12-18 PROCEDURE — 84100 ASSAY OF PHOSPHORUS: CPT

## 2017-12-18 PROCEDURE — 85730 THROMBOPLASTIN TIME PARTIAL: CPT

## 2017-12-18 PROCEDURE — 83735 ASSAY OF MAGNESIUM: CPT

## 2017-12-18 PROCEDURE — 84484 ASSAY OF TROPONIN QUANT: CPT

## 2017-12-18 PROCEDURE — 94002 VENT MGMT INPAT INIT DAY: CPT

## 2017-12-18 PROCEDURE — 87040 BLOOD CULTURE FOR BACTERIA: CPT

## 2017-12-18 PROCEDURE — 85025 COMPLETE CBC W/AUTO DIFF WBC: CPT

## 2017-12-18 PROCEDURE — 71010: CPT

## 2017-12-18 PROCEDURE — 85049 AUTOMATED PLATELET COUNT: CPT

## 2017-12-18 PROCEDURE — 80048 BASIC METABOLIC PNL TOTAL CA: CPT

## 2017-12-18 PROCEDURE — 87081 CULTURE SCREEN ONLY: CPT

## 2017-12-18 PROCEDURE — 94003 VENT MGMT INPAT SUBQ DAY: CPT

## 2017-12-18 RX ADMIN — PIPERACILLIN AND TAZOBACTAM 1 MLS/HR: 2; .25 INJECTION, POWDER, FOR SOLUTION INTRAVENOUS at 23:00

## 2017-12-18 RX ADMIN — CEFEPIME HYDROCHLORIDE 1 MLS/HR: 2 INJECTION, POWDER, FOR SOLUTION INTRAVENOUS at 15:19

## 2017-12-18 RX ADMIN — FENTANYL CITRATE 1 MCG: 50 INJECTION, SOLUTION INTRAMUSCULAR; INTRAVENOUS at 17:10

## 2017-12-18 RX ADMIN — ETOMIDATE 1 MG: 2 INJECTION, SOLUTION INTRAVENOUS at 14:10

## 2017-12-18 RX ADMIN — VANCOMYCIN HYDROCHLORIDE 1 MLS/HR: 1 INJECTION, POWDER, LYOPHILIZED, FOR SOLUTION INTRAVENOUS at 16:11

## 2017-12-18 RX ADMIN — MIDAZOLAM HYDROCHLORIDE 1 MG: 2 INJECTION, SOLUTION INTRAMUSCULAR; INTRAVENOUS at 17:10

## 2017-12-18 RX ADMIN — THIAMINE HYDROCHLORIDE 1 ML: 100 INJECTION, SOLUTION INTRAMUSCULAR; INTRAVENOUS at 13:45

## 2017-12-18 RX ADMIN — MIDAZOLAM HYDROCHLORIDE 1 MLS/HR: 5 INJECTION, SOLUTION INTRAMUSCULAR; INTRAVENOUS at 18:46

## 2017-12-18 RX ADMIN — Medication 1 MLS/HR: at 20:37

## 2017-12-18 RX ADMIN — Medication 1 MLS/HR: at 21:35

## 2017-12-18 RX ADMIN — PROPOFOL 1 MLS/HR: 10 INJECTION, EMULSION INTRAVENOUS at 14:57

## 2017-12-18 RX ADMIN — ROCURONIUM BROMIDE 1 MG: 10 INJECTION, SOLUTION INTRAVENOUS at 14:11

## 2017-12-19 LAB
ABNORMAL IP MESSAGE: 1
ADD MAN DIFF?: NO
ANION GAP: 14 (ref 8–16)
ARTERIAL BASE EXCESS: -5 MMOL/L (ref -3–3)
ARTERIAL BLOOD GAS OXYGEN SAT: 96.7 MMHG (ref 95–98)
ARTERIAL COHB: 0 % (ref 0–3)
ARTERIAL FRACTION OF OXYHGB: 96.3 % (ref 93–99)
ARTERIAL HCO3: 22.4 MMOL/L (ref 22–26)
ARTERIAL METHB: 0.4 % (ref 0–1.5)
ARTERIAL PCO2: 51.8 MMHG (ref 35–45)
ARTERIAL TOTAL HEMGLOBIN: 10.8 G/DL (ref 12–18)
BASOPHIL #: 0 10^3/UL (ref 0–0.1)
BASOPHILS %: 0.5 % (ref 0–2)
BLOOD GAS HIGH PEEP SETTING: 5 CMH2O
BLOOD GAS PIP: 27
BLOOD GAS TIDAL VOLUME: 500 ML
BLOOD UREA NITROGEN: 87 MG/DL (ref 7–20)
CALCIUM: 8.3 MG/DL (ref 8.4–10.2)
CARBON DIOXIDE: 20 MMOL/L (ref 21–31)
CHLORIDE: 101 MMOL/L (ref 97–110)
CREATININE: 5.05 MG/DL (ref 0.61–1.24)
EOSINOPHILS #: 0.3 10^3/UL (ref 0–0.5)
EOSINOPHILS %: 4.8 % (ref 0–7)
FIO2: 50 %
GLUCOSE: 60 MG/DL (ref 70–220)
HEMATOCRIT: 31.6 % (ref 42–52)
HEMOGLOBIN: 9.8 G/DL (ref 14–18)
LYMPHOCYTES #: 0.5 10^3/UL (ref 0.8–2.9)
LYMPHOCYTES %: 8.2 % (ref 15–51)
MEAN CORPUSCULAR HEMOGLOBIN: 31 PG (ref 29–33)
MEAN CORPUSCULAR HGB CONC: 31 G/DL (ref 32–37)
MEAN CORPUSCULAR VOLUME: 100 FL (ref 82–101)
MEAN PLATELET VOLUME: 11.3 FL (ref 7.4–10.4)
MODE: (no result)
MONOCYTE #: 0.7 10^3/UL (ref 0.3–0.9)
MONOCYTES %: 10.8 % (ref 0–11)
NEUTROPHIL #: 4.6 10^3/UL (ref 1.6–7.5)
NEUTROPHILS %: 74.9 % (ref 39–77)
NUCLEATED RED BLOOD CELLS #: 0.1 10^3/UL (ref 0–0)
NUCLEATED RED BLOOD CELLS%: 0.8 /100WBC (ref 0–0)
O2 A-A PPRESDIFF RESPIRATORY: 203.3 MMHG (ref 7–24)
PLATELET COUNT: 164 10^3/UL (ref 140–415)
POSITIVE DIFF: (no result)
POTASSIUM: 4.4 MMOL/L (ref 3.5–5.1)
RED BLOOD COUNT: 3.16 10^6/UL (ref 4.7–6.1)
RED CELL DISTRIBUTION WIDTH: 18.7 % (ref 11.5–14.5)
SODIUM: 131 MMOL/L (ref 135–144)
WHITE BLOOD COUNT: 6.2 10^3/UL (ref 4.8–10.8)

## 2017-12-19 RX ADMIN — INSULIN ASPART 1 UNIT: 100 INJECTION, SOLUTION INTRAVENOUS; SUBCUTANEOUS at 20:49

## 2017-12-19 RX ADMIN — LEVETIRACETAM 1 MG: 500 TABLET ORAL at 20:49

## 2017-12-19 RX ADMIN — HEPARIN SODIUM 1 UNIT: 1000 INJECTION, SOLUTION INTRAVENOUS; SUBCUTANEOUS at 14:36

## 2017-12-19 RX ADMIN — INSULIN ASPART 1 UNIT: 100 INJECTION, SOLUTION INTRAVENOUS; SUBCUTANEOUS at 17:16

## 2017-12-19 RX ADMIN — PIPERACILLIN AND TAZOBACTAM 1 MLS/HR: 2; .25 INJECTION, POWDER, FOR SOLUTION INTRAVENOUS at 14:00

## 2017-12-19 RX ADMIN — PIPERACILLIN AND TAZOBACTAM 1 MLS/HR: 2; .25 INJECTION, POWDER, FOR SOLUTION INTRAVENOUS at 17:00

## 2017-12-19 RX ADMIN — MIDAZOLAM HYDROCHLORIDE 1 MLS/HR: 5 INJECTION, SOLUTION INTRAMUSCULAR; INTRAVENOUS at 07:01

## 2017-12-19 RX ADMIN — THIAMINE HYDROCHLORIDE 1 MLS/HR: 100 INJECTION, SOLUTION INTRAMUSCULAR; INTRAVENOUS at 18:09

## 2017-12-19 RX ADMIN — PIPERACILLIN AND TAZOBACTAM 1 MLS/HR: 2; .25 INJECTION, POWDER, FOR SOLUTION INTRAVENOUS at 07:00

## 2017-12-19 RX ADMIN — DEXTROSE MONOHYDRATE 1 ML: 500 INJECTION PARENTERAL at 17:26

## 2017-12-19 RX ADMIN — THIAMINE HYDROCHLORIDE 1 MLS/HR: 100 INJECTION, SOLUTION INTRAMUSCULAR; INTRAVENOUS at 05:24

## 2017-12-20 LAB
ADD MAN DIFF?: NO
ALLEN TEST: (no result)
ANION GAP: 13 (ref 8–16)
ARTERIAL BASE EXCESS: -1 MMOL/L (ref -3–3)
ARTERIAL BLOOD GAS OXYGEN SAT: 94.7 MMHG (ref 95–98)
ARTERIAL COHB: 1.2 % (ref 0–3)
ARTERIAL FRACTION OF OXYHGB: 93.5 % (ref 93–99)
ARTERIAL HCO3: 24.1 MMOL/L (ref 22–26)
ARTERIAL METHB: 0.1 % (ref 0–1.5)
ARTERIAL PCO2: 42.1 MMHG (ref 35–45)
ARTERIAL TOTAL HEMGLOBIN: 11 G/DL (ref 12–18)
BASOPHIL #: 0 10^3/UL (ref 0–0.1)
BASOPHILS %: 0.4 % (ref 0–2)
BLOOD GAS LOW PEEP SETTING: 5 CMH2O
BLOOD GAS TIDAL VOLUME: 500 ML
BLOOD UREA NITROGEN: 56 MG/DL (ref 7–20)
CALCIUM: 8.6 MG/DL (ref 8.4–10.2)
CARBON DIOXIDE: 26 MMOL/L (ref 21–31)
CHLORIDE: 98 MMOL/L (ref 97–110)
CREATININE: 4.24 MG/DL (ref 0.61–1.24)
EOSINOPHILS #: 0.3 10^3/UL (ref 0–0.5)
EOSINOPHILS %: 4.2 % (ref 0–7)
FIO2: 30 %
GLUCOSE: 81 MG/DL (ref 70–220)
HEMATOCRIT: 32.1 % (ref 42–52)
HEMOGLOBIN: 9.9 G/DL (ref 14–18)
LYMPHOCYTES #: 0.7 10^3/UL (ref 0.8–2.9)
LYMPHOCYTES %: 10.5 % (ref 15–51)
MAGNESIUM: 2.2 MG/DL (ref 1.7–2.5)
MEAN CORPUSCULAR HEMOGLOBIN: 30.6 PG (ref 29–33)
MEAN CORPUSCULAR HGB CONC: 30.8 G/DL (ref 32–37)
MEAN CORPUSCULAR VOLUME: 99.1 FL (ref 82–101)
MEAN PLATELET VOLUME: 11.7 FL (ref 7.4–10.4)
MODE: (no result)
MONOCYTE #: 0.8 10^3/UL (ref 0.3–0.9)
MONOCYTES %: 12.1 % (ref 0–11)
NEUTROPHIL #: 4.8 10^3/UL (ref 1.6–7.5)
NEUTROPHILS %: 72.1 % (ref 39–77)
NUCLEATED RED BLOOD CELLS #: 0 10^3/UL (ref 0–0)
NUCLEATED RED BLOOD CELLS%: 0.4 /100WBC (ref 0–0)
O2 A-A PPRESDIFF RESPIRATORY: 91.2 MMHG (ref 7–24)
PHOSPHORUS: 3.5 MG/DL (ref 2.5–4.9)
PLATELET COUNT: 159 10^3/UL (ref 140–415)
POTASSIUM: 4 MMOL/L (ref 3.5–5.1)
RED BLOOD COUNT: 3.24 10^6/UL (ref 4.7–6.1)
RED CELL DISTRIBUTION WIDTH: 18.2 % (ref 11.5–14.5)
SODIUM: 133 MMOL/L (ref 135–144)
VANCOMYCIN,RANDOM: 13.1 UG/ML
WHITE BLOOD COUNT: 6.7 10^3/UL (ref 4.8–10.8)

## 2017-12-20 RX ADMIN — LEVETIRACETAM 1 MG: 500 TABLET ORAL at 20:10

## 2017-12-20 RX ADMIN — VANCOMYCIN HYDROCHLORIDE 1 MLS/HR: 500 INJECTION, POWDER, LYOPHILIZED, FOR SOLUTION INTRAVENOUS at 16:48

## 2017-12-20 RX ADMIN — LEVETIRACETAM 1 MG: 500 TABLET ORAL at 08:57

## 2017-12-20 RX ADMIN — PIPERACILLIN AND TAZOBACTAM 1 MLS/HR: 2; .25 INJECTION, POWDER, FOR SOLUTION INTRAVENOUS at 21:33

## 2017-12-20 RX ADMIN — INSULIN ASPART 1 UNIT: 100 INJECTION, SOLUTION INTRAVENOUS; SUBCUTANEOUS at 17:35

## 2017-12-20 RX ADMIN — PIPERACILLIN AND TAZOBACTAM 1 MLS/HR: 2; .25 INJECTION, POWDER, FOR SOLUTION INTRAVENOUS at 14:32

## 2017-12-20 RX ADMIN — INSULIN ASPART 1 UNIT: 100 INJECTION, SOLUTION INTRAVENOUS; SUBCUTANEOUS at 11:30

## 2017-12-20 RX ADMIN — INSULIN ASPART 1 UNIT: 100 INJECTION, SOLUTION INTRAVENOUS; SUBCUTANEOUS at 20:18

## 2017-12-20 RX ADMIN — INSULIN ASPART 1 UNIT: 100 INJECTION, SOLUTION INTRAVENOUS; SUBCUTANEOUS at 07:35

## 2017-12-20 RX ADMIN — FAMOTIDINE 1 MG: 20 TABLET ORAL at 08:58

## 2017-12-20 RX ADMIN — PIPERACILLIN AND TAZOBACTAM 1 MLS/HR: 2; .25 INJECTION, POWDER, FOR SOLUTION INTRAVENOUS at 05:28

## 2017-12-20 RX ADMIN — THIAMINE HYDROCHLORIDE 1 MLS/HR: 100 INJECTION, SOLUTION INTRAMUSCULAR; INTRAVENOUS at 04:00

## 2017-12-21 LAB
ADD MAN DIFF?: NO
ANION GAP: 16 (ref 8–16)
BASOPHIL #: 0 10^3/UL (ref 0–0.1)
BASOPHILS %: 0.5 % (ref 0–2)
BLOOD UREA NITROGEN: 74 MG/DL (ref 7–20)
CALCIUM: 8.5 MG/DL (ref 8.4–10.2)
CARBON DIOXIDE: 23 MMOL/L (ref 21–31)
CHLORIDE: 98 MMOL/L (ref 97–110)
CREATININE: 5.15 MG/DL (ref 0.61–1.24)
EOSINOPHILS #: 0.5 10^3/UL (ref 0–0.5)
EOSINOPHILS %: 8.8 % (ref 0–7)
GLUCOSE: 95 MG/DL (ref 70–220)
HEMATOCRIT: 33.2 % (ref 42–52)
HEMOGLOBIN: 10.4 G/DL (ref 14–18)
LYMPHOCYTES #: 0.7 10^3/UL (ref 0.8–2.9)
LYMPHOCYTES %: 12.1 % (ref 15–51)
MAGNESIUM: 2.4 MG/DL (ref 1.7–2.5)
MEAN CORPUSCULAR HEMOGLOBIN: 30.8 PG (ref 29–33)
MEAN CORPUSCULAR HGB CONC: 31.3 G/DL (ref 32–37)
MEAN CORPUSCULAR VOLUME: 98.2 FL (ref 82–101)
MEAN PLATELET VOLUME: 11 FL (ref 7.4–10.4)
MONOCYTE #: 0.7 10^3/UL (ref 0.3–0.9)
MONOCYTES %: 10.8 % (ref 0–11)
NEUTROPHIL #: 4.1 10^3/UL (ref 1.6–7.5)
NEUTROPHILS %: 67.3 % (ref 39–77)
NUCLEATED RED BLOOD CELLS #: 0 10^3/UL (ref 0–0)
NUCLEATED RED BLOOD CELLS%: 0 /100WBC (ref 0–0)
PHOSPHORUS: 5.4 MG/DL (ref 2.5–4.9)
PLATELET COUNT: 133 10^3/UL (ref 140–415)
POTASSIUM: 4 MMOL/L (ref 3.5–5.1)
RED BLOOD COUNT: 3.38 10^6/UL (ref 4.7–6.1)
RED CELL DISTRIBUTION WIDTH: 17.9 % (ref 11.5–14.5)
SODIUM: 133 MMOL/L (ref 135–144)
WHITE BLOOD COUNT: 6 10^3/UL (ref 4.8–10.8)

## 2017-12-21 RX ADMIN — INSULIN ASPART 1 UNIT: 100 INJECTION, SOLUTION INTRAVENOUS; SUBCUTANEOUS at 11:30

## 2017-12-21 RX ADMIN — INSULIN ASPART 1 UNIT: 100 INJECTION, SOLUTION INTRAVENOUS; SUBCUTANEOUS at 07:35

## 2017-12-21 RX ADMIN — PIPERACILLIN AND TAZOBACTAM 1 MLS/HR: 2; .25 INJECTION, POWDER, FOR SOLUTION INTRAVENOUS at 06:15

## 2017-12-21 RX ADMIN — INSULIN ASPART 1 UNIT: 100 INJECTION, SOLUTION INTRAVENOUS; SUBCUTANEOUS at 20:09

## 2017-12-21 RX ADMIN — EPOETIN ALFA 1 UNITS: 4000 SOLUTION INTRAVENOUS; SUBCUTANEOUS at 09:43

## 2017-12-21 RX ADMIN — DIPHENHYDRAMINE HYDROCHLORIDE 1 MG: 25 CAPSULE ORAL at 15:18

## 2017-12-21 RX ADMIN — LEVETIRACETAM 1 MG: 500 TABLET ORAL at 08:21

## 2017-12-21 RX ADMIN — LEVETIRACETAM 1 MG: 500 TABLET ORAL at 20:03

## 2017-12-21 RX ADMIN — PIPERACILLIN AND TAZOBACTAM 1 MLS/HR: 2; .25 INJECTION, POWDER, FOR SOLUTION INTRAVENOUS at 22:05

## 2017-12-21 RX ADMIN — INSULIN ASPART 1 UNIT: 100 INJECTION, SOLUTION INTRAVENOUS; SUBCUTANEOUS at 17:35

## 2017-12-21 RX ADMIN — PIPERACILLIN AND TAZOBACTAM 1 MLS/HR: 2; .25 INJECTION, POWDER, FOR SOLUTION INTRAVENOUS at 12:23

## 2017-12-21 RX ADMIN — DIPHENHYDRAMINE HYDROCHLORIDE 1 MG: 25 CAPSULE ORAL at 23:31

## 2017-12-22 LAB
ADD MAN DIFF?: NO
ALANINE AMINOTRANSFERASE: 36 IU/L (ref 13–69)
ALBUMIN/GLOBULIN RATIO: 0.64
ALBUMIN: 3.2 G/DL (ref 3.3–4.9)
ALKALINE PHOSPHATASE: 540 IU/L (ref 42–121)
ANION GAP: 16 (ref 8–16)
ARTERIAL BASE EXCESS: -0.7 MMOL/L (ref -3–3)
ARTERIAL BLOOD GAS OXYGEN SAT: 98.8 MMHG (ref 95–98)
ARTERIAL COHB: 0.6 % (ref 0–3)
ARTERIAL FRACTION OF OXYHGB: 97.9 % (ref 93–99)
ARTERIAL HCO3: 26.3 MMOL/L (ref 22–26)
ARTERIAL METHB: 0.3 % (ref 0–1.5)
ARTERIAL PCO2: 54.4 MMHG (ref 35–45)
ARTERIAL TOTAL HEMGLOBIN: 11.8 G/DL (ref 12–18)
ASPARTATE AMINO TRANSFERASE: 34 IU/L (ref 15–46)
BASOPHIL #: 0 10^3/UL (ref 0–0.1)
BASOPHILS %: 0.7 % (ref 0–2)
BILIRUBIN,DIRECT: 0 MG/DL (ref 0–0.2)
BILIRUBIN,TOTAL: 0 MG/DL (ref 0.2–1.3)
BLOOD GAS LOW PEEP SETTING: 5 CMH2O
BLOOD GAS TIDAL VOLUME: 500 ML
BLOOD UREA NITROGEN: 55 MG/DL (ref 7–20)
CALCIUM: 8.8 MG/DL (ref 8.4–10.2)
CARBON DIOXIDE: 27 MMOL/L (ref 21–31)
CHLORIDE: 96 MMOL/L (ref 97–110)
CREATININE: 4.59 MG/DL (ref 0.61–1.24)
EOSINOPHILS #: 0.6 10^3/UL (ref 0–0.5)
EOSINOPHILS %: 9.7 % (ref 0–7)
FIO2: 50 %
GLOBULIN: 5 G/DL (ref 1.3–3.2)
GLUCOSE: 121 MG/DL (ref 70–220)
HEMATOCRIT: 35.7 % (ref 42–52)
HEMOGLOBIN: 10.8 G/DL (ref 14–18)
LYMPHOCYTES #: 0.7 10^3/UL (ref 0.8–2.9)
LYMPHOCYTES %: 11.2 % (ref 15–51)
MAGNESIUM: 2.3 MG/DL (ref 1.7–2.5)
MEAN CORPUSCULAR HEMOGLOBIN: 30.3 PG (ref 29–33)
MEAN CORPUSCULAR HGB CONC: 30.3 G/DL (ref 32–37)
MEAN CORPUSCULAR VOLUME: 100.3 FL (ref 82–101)
MEAN PLATELET VOLUME: 10.5 FL (ref 7.4–10.4)
MODE: (no result)
MONOCYTE #: 0.7 10^3/UL (ref 0.3–0.9)
MONOCYTES %: 11 % (ref 0–11)
NEUTROPHIL #: 4.1 10^3/UL (ref 1.6–7.5)
NEUTROPHILS %: 66.9 % (ref 39–77)
NUCLEATED RED BLOOD CELLS #: 0 10^3/UL (ref 0–0)
NUCLEATED RED BLOOD CELLS%: 0 /100WBC (ref 0–0)
O2 A-A PPRESDIFF RESPIRATORY: 157.9 MMHG (ref 7–24)
PHOSPHORUS: 7.2 MG/DL (ref 2.5–4.9)
PLATELET COUNT: 121 10^3/UL (ref 140–415)
POTASSIUM: 4.1 MMOL/L (ref 3.5–5.1)
RED BLOOD COUNT: 3.56 10^6/UL (ref 4.7–6.1)
RED CELL DISTRIBUTION WIDTH: 17.6 % (ref 11.5–14.5)
SODIUM: 135 MMOL/L (ref 135–144)
TOTAL PROTEIN: 8.2 G/DL (ref 6.1–8.1)
WHITE BLOOD COUNT: 6.1 10^3/UL (ref 4.8–10.8)

## 2017-12-22 RX ADMIN — PIPERACILLIN AND TAZOBACTAM 1 MLS/HR: 2; .25 INJECTION, POWDER, FOR SOLUTION INTRAVENOUS at 21:38

## 2017-12-22 RX ADMIN — DIPHENHYDRAMINE HYDROCHLORIDE 1 MG: 25 CAPSULE ORAL at 08:28

## 2017-12-22 RX ADMIN — FAMOTIDINE 1 MG: 20 TABLET ORAL at 08:28

## 2017-12-22 RX ADMIN — PROPOFOL 1 MLS/HR: 10 INJECTION, EMULSION INTRAVENOUS at 16:47

## 2017-12-22 RX ADMIN — EPOETIN ALFA 1 UNITS: 4000 SOLUTION INTRAVENOUS; SUBCUTANEOUS at 17:42

## 2017-12-22 RX ADMIN — LEVETIRACETAM 1 MG: 500 TABLET ORAL at 21:36

## 2017-12-22 RX ADMIN — INSULIN ASPART 1 UNIT: 100 INJECTION, SOLUTION INTRAVENOUS; SUBCUTANEOUS at 07:35

## 2017-12-22 RX ADMIN — INSULIN ASPART 1 UNIT: 100 INJECTION, SOLUTION INTRAVENOUS; SUBCUTANEOUS at 11:30

## 2017-12-22 RX ADMIN — INSULIN ASPART 1 UNIT: 100 INJECTION, SOLUTION INTRAVENOUS; SUBCUTANEOUS at 21:00

## 2017-12-22 RX ADMIN — ACETAMINOPHEN 1 MG: 325 TABLET, FILM COATED ORAL at 08:28

## 2017-12-22 RX ADMIN — PROPOFOL 1 MLS/HR: 10 INJECTION, EMULSION INTRAVENOUS at 23:13

## 2017-12-22 RX ADMIN — PIPERACILLIN AND TAZOBACTAM 1 MLS/HR: 2; .25 INJECTION, POWDER, FOR SOLUTION INTRAVENOUS at 06:06

## 2017-12-22 RX ADMIN — PROPOFOL 1 MLS/HR: 10 INJECTION, EMULSION INTRAVENOUS at 11:04

## 2017-12-22 RX ADMIN — PIPERACILLIN AND TAZOBACTAM 1 MLS/HR: 2; .25 INJECTION, POWDER, FOR SOLUTION INTRAVENOUS at 15:12

## 2017-12-22 RX ADMIN — INSULIN ASPART 1 UNIT: 100 INJECTION, SOLUTION INTRAVENOUS; SUBCUTANEOUS at 17:35

## 2017-12-22 RX ADMIN — LEVETIRACETAM 1 MG: 500 TABLET ORAL at 08:28

## 2017-12-23 LAB
ANION GAP: 16 (ref 8–16)
BLOOD UREA NITROGEN: 69 MG/DL (ref 7–20)
CALCIUM: 8.6 MG/DL (ref 8.4–10.2)
CARBON DIOXIDE: 24 MMOL/L (ref 21–31)
CHLORIDE: 97 MMOL/L (ref 97–110)
CREATININE: 5.27 MG/DL (ref 0.61–1.24)
GLUCOSE: 82 MG/DL (ref 70–220)
MAGNESIUM: 2.4 MG/DL (ref 1.7–2.5)
PHOSPHORUS: 5.1 MG/DL (ref 2.5–4.9)
POTASSIUM: 3.9 MMOL/L (ref 3.5–5.1)
SODIUM: 133 MMOL/L (ref 135–144)
VANCOMYCIN,RANDOM: 20 UG/ML

## 2017-12-23 RX ADMIN — LEVETIRACETAM 1 MG: 500 TABLET ORAL at 08:32

## 2017-12-23 RX ADMIN — INSULIN ASPART 1 UNIT: 100 INJECTION, SOLUTION INTRAVENOUS; SUBCUTANEOUS at 07:35

## 2017-12-23 RX ADMIN — INSULIN ASPART 1 UNIT: 100 INJECTION, SOLUTION INTRAVENOUS; SUBCUTANEOUS at 21:00

## 2017-12-23 RX ADMIN — INSULIN ASPART 1 UNIT: 100 INJECTION, SOLUTION INTRAVENOUS; SUBCUTANEOUS at 17:35

## 2017-12-23 RX ADMIN — PIPERACILLIN AND TAZOBACTAM 1 MLS/HR: 2; .25 INJECTION, POWDER, FOR SOLUTION INTRAVENOUS at 05:48

## 2017-12-23 RX ADMIN — INSULIN ASPART 1 UNIT: 100 INJECTION, SOLUTION INTRAVENOUS; SUBCUTANEOUS at 11:30

## 2017-12-23 RX ADMIN — LEVETIRACETAM 1 MG: 500 TABLET ORAL at 21:38

## 2017-12-23 RX ADMIN — LEVETIRACETAM 1 MG: 500 TABLET ORAL at 21:00

## 2017-12-23 RX ADMIN — PIPERACILLIN AND TAZOBACTAM 1 MLS/HR: 2; .25 INJECTION, POWDER, FOR SOLUTION INTRAVENOUS at 21:39

## 2017-12-23 RX ADMIN — PROPOFOL 1 MLS/HR: 10 INJECTION, EMULSION INTRAVENOUS at 14:16

## 2017-12-23 RX ADMIN — VANCOMYCIN HYDROCHLORIDE 1 MLS/HR: 500 INJECTION, POWDER, LYOPHILIZED, FOR SOLUTION INTRAVENOUS at 21:23

## 2017-12-23 RX ADMIN — PROPOFOL 1 MLS/HR: 10 INJECTION, EMULSION INTRAVENOUS at 05:03

## 2017-12-23 RX ADMIN — PIPERACILLIN AND TAZOBACTAM 1 MLS/HR: 2; .25 INJECTION, POWDER, FOR SOLUTION INTRAVENOUS at 14:14

## 2017-12-24 LAB
ADD MAN DIFF?: NO
ANION GAP: 14 (ref 8–16)
BASOPHIL #: 0.1 10^3/UL (ref 0–0.1)
BASOPHILS %: 1 % (ref 0–2)
BLOOD UREA NITROGEN: 55 MG/DL (ref 7–20)
CALCIUM: 8.6 MG/DL (ref 8.4–10.2)
CARBON DIOXIDE: 25 MMOL/L (ref 21–31)
CHLORIDE: 97 MMOL/L (ref 97–110)
CREATININE: 4.55 MG/DL (ref 0.61–1.24)
EOSINOPHILS #: 0.7 10^3/UL (ref 0–0.5)
EOSINOPHILS %: 11.9 % (ref 0–7)
GLUCOSE: 99 MG/DL (ref 70–220)
HEMATOCRIT: 35.2 % (ref 42–52)
HEMOGLOBIN: 11 G/DL (ref 14–18)
LYMPHOCYTES #: 0.9 10^3/UL (ref 0.8–2.9)
LYMPHOCYTES %: 15.6 % (ref 15–51)
MEAN CORPUSCULAR HEMOGLOBIN: 30.2 PG (ref 29–33)
MEAN CORPUSCULAR HGB CONC: 31.3 G/DL (ref 32–37)
MEAN CORPUSCULAR VOLUME: 96.7 FL (ref 82–101)
MEAN PLATELET VOLUME: 13 FL (ref 7.4–10.4)
MONOCYTE #: 0.8 10^3/UL (ref 0.3–0.9)
MONOCYTES %: 14.2 % (ref 0–11)
NEUTROPHIL #: 3.3 10^3/UL (ref 1.6–7.5)
NEUTROPHILS %: 57 % (ref 39–77)
NUCLEATED RED BLOOD CELLS #: 0 10^3/UL (ref 0–0)
NUCLEATED RED BLOOD CELLS%: 0 /100WBC (ref 0–0)
PLATELET COUNT: 128 10^3/UL (ref 140–415)
POTASSIUM: 4.1 MMOL/L (ref 3.5–5.1)
RED BLOOD COUNT: 3.64 10^6/UL (ref 4.7–6.1)
RED CELL DISTRIBUTION WIDTH: 18 % (ref 11.5–14.5)
SODIUM: 132 MMOL/L (ref 135–144)
WHITE BLOOD COUNT: 5.7 10^3/UL (ref 4.8–10.8)

## 2017-12-24 RX ADMIN — INSULIN ASPART 1 UNIT: 100 INJECTION, SOLUTION INTRAVENOUS; SUBCUTANEOUS at 21:00

## 2017-12-24 RX ADMIN — PROPOFOL 1 MLS/HR: 10 INJECTION, EMULSION INTRAVENOUS at 14:12

## 2017-12-24 RX ADMIN — LEVETIRACETAM 1 MG: 500 TABLET ORAL at 09:26

## 2017-12-24 RX ADMIN — FAMOTIDINE 1 MG: 20 TABLET ORAL at 09:26

## 2017-12-24 RX ADMIN — LEVETIRACETAM 1 MG: 500 TABLET ORAL at 21:41

## 2017-12-24 RX ADMIN — INSULIN ASPART 1 UNIT: 100 INJECTION, SOLUTION INTRAVENOUS; SUBCUTANEOUS at 11:30

## 2017-12-24 RX ADMIN — PROPOFOL 1 MLS/HR: 10 INJECTION, EMULSION INTRAVENOUS at 04:47

## 2017-12-24 RX ADMIN — PIPERACILLIN AND TAZOBACTAM 1 MLS/HR: 2; .25 INJECTION, POWDER, FOR SOLUTION INTRAVENOUS at 21:41

## 2017-12-24 RX ADMIN — INSULIN ASPART 1 UNIT: 100 INJECTION, SOLUTION INTRAVENOUS; SUBCUTANEOUS at 07:35

## 2017-12-24 RX ADMIN — PIPERACILLIN AND TAZOBACTAM 1 MLS/HR: 2; .25 INJECTION, POWDER, FOR SOLUTION INTRAVENOUS at 06:36

## 2017-12-24 RX ADMIN — PIPERACILLIN AND TAZOBACTAM 1 MLS/HR: 2; .25 INJECTION, POWDER, FOR SOLUTION INTRAVENOUS at 14:15

## 2017-12-24 RX ADMIN — INSULIN ASPART 1 UNIT: 100 INJECTION, SOLUTION INTRAVENOUS; SUBCUTANEOUS at 17:20

## 2017-12-25 LAB
ABNORMAL IP MESSAGE: 1
ADD MAN DIFF?: NO
ANION GAP: 16 (ref 8–16)
BASOPHIL #: 0.1 10^3/UL (ref 0–0.1)
BASOPHILS %: 1.1 % (ref 0–2)
BLOOD UREA NITROGEN: 68 MG/DL (ref 7–20)
CALCIUM: 8.9 MG/DL (ref 8.4–10.2)
CARBON DIOXIDE: 24 MMOL/L (ref 21–31)
CHLORIDE: 97 MMOL/L (ref 97–110)
CREATININE: 5.36 MG/DL (ref 0.61–1.24)
EOSINOPHILS #: 0.7 10^3/UL (ref 0–0.5)
EOSINOPHILS %: 12.6 % (ref 0–7)
GLUCOSE: 108 MG/DL (ref 70–220)
HEMATOCRIT: 34.8 % (ref 42–52)
HEMOGLOBIN: 10.9 G/DL (ref 14–18)
LYMPHOCYTES #: 1.1 10^3/UL (ref 0.8–2.9)
LYMPHOCYTES %: 18.7 % (ref 15–51)
MAGNESIUM: 2.4 MG/DL (ref 1.7–2.5)
MEAN CORPUSCULAR HEMOGLOBIN: 30.4 PG (ref 29–33)
MEAN CORPUSCULAR HGB CONC: 31.3 G/DL (ref 32–37)
MEAN CORPUSCULAR VOLUME: 97.2 FL (ref 82–101)
MEAN PLATELET VOLUME: 12 FL (ref 7.4–10.4)
MONOCYTE #: 0.7 10^3/UL (ref 0.3–0.9)
MONOCYTES %: 11.7 % (ref 0–11)
NEUTROPHIL #: 3.2 10^3/UL (ref 1.6–7.5)
NEUTROPHILS %: 55.4 % (ref 39–77)
NUCLEATED RED BLOOD CELLS #: 0 10^3/UL (ref 0–0)
NUCLEATED RED BLOOD CELLS%: 0 /100WBC (ref 0–0)
PLATELET COUNT: 125 10^3/UL (ref 140–415)
POSITIVE DIFF: (no result)
POTASSIUM: 4.2 MMOL/L (ref 3.5–5.1)
RED BLOOD COUNT: 3.58 10^6/UL (ref 4.7–6.1)
RED CELL DISTRIBUTION WIDTH: 17.3 % (ref 11.5–14.5)
SODIUM: 133 MMOL/L (ref 135–144)
WHITE BLOOD COUNT: 5.7 10^3/UL (ref 4.8–10.8)

## 2017-12-25 RX ADMIN — PIPERACILLIN AND TAZOBACTAM 1 MLS/HR: 2; .25 INJECTION, POWDER, FOR SOLUTION INTRAVENOUS at 05:49

## 2017-12-25 RX ADMIN — INSULIN ASPART 1 UNIT: 100 INJECTION, SOLUTION INTRAVENOUS; SUBCUTANEOUS at 17:19

## 2017-12-25 RX ADMIN — PIPERACILLIN AND TAZOBACTAM 1 MLS/HR: 2; .25 INJECTION, POWDER, FOR SOLUTION INTRAVENOUS at 21:19

## 2017-12-25 RX ADMIN — PROPOFOL 1 MLS/HR: 10 INJECTION, EMULSION INTRAVENOUS at 06:49

## 2017-12-25 RX ADMIN — PROPOFOL 1 MLS/HR: 10 INJECTION, EMULSION INTRAVENOUS at 00:19

## 2017-12-25 RX ADMIN — PIPERACILLIN AND TAZOBACTAM 1 MLS/HR: 2; .25 INJECTION, POWDER, FOR SOLUTION INTRAVENOUS at 13:33

## 2017-12-25 RX ADMIN — INSULIN ASPART 1 UNIT: 100 INJECTION, SOLUTION INTRAVENOUS; SUBCUTANEOUS at 21:00

## 2017-12-25 RX ADMIN — INSULIN ASPART 1 UNIT: 100 INJECTION, SOLUTION INTRAVENOUS; SUBCUTANEOUS at 07:35

## 2017-12-25 RX ADMIN — EPOETIN ALFA 1 UNITS: 4000 SOLUTION INTRAVENOUS; SUBCUTANEOUS at 17:14

## 2017-12-25 RX ADMIN — LEVETIRACETAM 1 MG: 500 TABLET ORAL at 09:03

## 2017-12-25 RX ADMIN — PROPOFOL 1 MLS/HR: 10 INJECTION, EMULSION INTRAVENOUS at 14:09

## 2017-12-25 RX ADMIN — INSULIN ASPART 1 UNIT: 100 INJECTION, SOLUTION INTRAVENOUS; SUBCUTANEOUS at 11:25

## 2017-12-25 RX ADMIN — LEVETIRACETAM 1 MG: 500 TABLET ORAL at 21:30

## 2017-12-26 LAB
ABNORMAL IP MESSAGE: 1
ADD MAN DIFF?: NO
ANION GAP: 15 (ref 8–16)
ARTERIAL BASE EXCESS: -1.2 MMOL/L (ref -3–3)
ARTERIAL BLOOD GAS OXYGEN SAT: 90.7 MMHG (ref 95–98)
ARTERIAL COHB: 1 % (ref 0–3)
ARTERIAL FRACTION OF OXYHGB: 89.5 % (ref 93–99)
ARTERIAL HCO3: 30.8 MMOL/L (ref 22–26)
ARTERIAL METHB: 0.3 % (ref 0–1.5)
ARTERIAL PCO2: 101.3 MMHG (ref 35–45)
ARTERIAL TOTAL HEMGLOBIN: 12 G/DL (ref 12–18)
BASOPHIL #: 0.1 10^3/UL (ref 0–0.1)
BASOPHILS %: 1.1 % (ref 0–2)
BLOOD UREA NITROGEN: 51 MG/DL (ref 7–20)
CALCIUM: 9 MG/DL (ref 8.4–10.2)
CARBON DIOXIDE: 28 MMOL/L (ref 21–31)
CHLORIDE: 94 MMOL/L (ref 97–110)
CREATININE: 3.95 MG/DL (ref 0.61–1.24)
EOSINOPHILS #: 0.7 10^3/UL (ref 0–0.5)
EOSINOPHILS %: 13.3 % (ref 0–7)
FIO2: 27 %
GLUCOSE: 88 MG/DL (ref 70–220)
HEMATOCRIT: 36.4 % (ref 42–52)
HEMOGLOBIN: 11.5 G/DL (ref 14–18)
LYMPHOCYTES #: 0.9 10^3/UL (ref 0.8–2.9)
LYMPHOCYTES %: 17 % (ref 15–51)
MAGNESIUM: 2.3 MG/DL (ref 1.7–2.5)
MEAN CORPUSCULAR HEMOGLOBIN: 30.7 PG (ref 29–33)
MEAN CORPUSCULAR HGB CONC: 31.6 G/DL (ref 32–37)
MEAN CORPUSCULAR VOLUME: 97.1 FL (ref 82–101)
MODE: (no result)
MONOCYTE #: 0.6 10^3/UL (ref 0.3–0.9)
MONOCYTES %: 11.4 % (ref 0–11)
NEUTROPHIL #: 3 10^3/UL (ref 1.6–7.5)
NEUTROPHILS %: 56.6 % (ref 39–77)
NUCLEATED RED BLOOD CELLS #: 0 10^3/UL (ref 0–0)
NUCLEATED RED BLOOD CELLS%: 0 /100WBC (ref 0–0)
O2 A-A PPRESDIFF RESPIRATORY: 0.1 MMHG (ref 7–24)
PHOSPHORUS: 4.3 MG/DL (ref 2.5–4.9)
PLATELET COUNT: 103 10^3/UL (ref 140–415)
POSITIVE DIFF: (no result)
POTASSIUM: 3.7 MMOL/L (ref 3.5–5.1)
RED BLOOD COUNT: 3.75 10^6/UL (ref 4.7–6.1)
RED CELL DISTRIBUTION WIDTH: 17.7 % (ref 11.5–14.5)
SODIUM: 133 MMOL/L (ref 135–144)
WHITE BLOOD COUNT: 5.3 10^3/UL (ref 4.8–10.8)

## 2017-12-26 RX ADMIN — MORPHINE SULFATE 1 MG: 2 INJECTION, SOLUTION INTRAMUSCULAR; INTRAVENOUS at 09:58

## 2017-12-26 RX ADMIN — PROPOFOL 1 MLS/HR: 10 INJECTION, EMULSION INTRAVENOUS at 01:17

## 2017-12-26 RX ADMIN — INSULIN ASPART 1 UNIT: 100 INJECTION, SOLUTION INTRAVENOUS; SUBCUTANEOUS at 21:00

## 2017-12-26 RX ADMIN — PROPOFOL 1 MLS/HR: 10 INJECTION, EMULSION INTRAVENOUS at 22:14

## 2017-12-26 RX ADMIN — FAMOTIDINE 1 MG: 20 TABLET ORAL at 09:38

## 2017-12-26 RX ADMIN — PIPERACILLIN AND TAZOBACTAM 1 MLS/HR: 2; .25 INJECTION, POWDER, FOR SOLUTION INTRAVENOUS at 21:45

## 2017-12-26 RX ADMIN — PROPOFOL 1 MLS/HR: 10 INJECTION, EMULSION INTRAVENOUS at 12:43

## 2017-12-26 RX ADMIN — INSULIN ASPART 1 UNIT: 100 INJECTION, SOLUTION INTRAVENOUS; SUBCUTANEOUS at 17:35

## 2017-12-26 RX ADMIN — INSULIN ASPART 1 UNIT: 100 INJECTION, SOLUTION INTRAVENOUS; SUBCUTANEOUS at 07:35

## 2017-12-26 RX ADMIN — PIPERACILLIN AND TAZOBACTAM 1 MLS/HR: 2; .25 INJECTION, POWDER, FOR SOLUTION INTRAVENOUS at 14:32

## 2017-12-26 RX ADMIN — INSULIN ASPART 1 UNIT: 100 INJECTION, SOLUTION INTRAVENOUS; SUBCUTANEOUS at 11:30

## 2017-12-26 RX ADMIN — LEVETIRACETAM 1 MG: 500 TABLET ORAL at 21:45

## 2017-12-26 RX ADMIN — PIPERACILLIN AND TAZOBACTAM 1 MLS/HR: 2; .25 INJECTION, POWDER, FOR SOLUTION INTRAVENOUS at 06:41

## 2017-12-26 RX ADMIN — LEVETIRACETAM 1 MG: 500 TABLET ORAL at 09:38

## 2017-12-27 LAB — VANCOMYCIN,RANDOM: 17 UG/ML

## 2017-12-27 RX ADMIN — LEVETIRACETAM 1 MG: 500 TABLET ORAL at 08:58

## 2017-12-27 RX ADMIN — PROPOFOL 1 MLS/HR: 10 INJECTION, EMULSION INTRAVENOUS at 08:08

## 2017-12-27 RX ADMIN — PIPERACILLIN AND TAZOBACTAM 1 MLS/HR: 2; .25 INJECTION, POWDER, FOR SOLUTION INTRAVENOUS at 14:42

## 2017-12-27 RX ADMIN — INSULIN ASPART 1 UNIT: 100 INJECTION, SOLUTION INTRAVENOUS; SUBCUTANEOUS at 11:30

## 2017-12-27 RX ADMIN — INSULIN ASPART 1 UNIT: 100 INJECTION, SOLUTION INTRAVENOUS; SUBCUTANEOUS at 17:10

## 2017-12-27 RX ADMIN — INSULIN ASPART 1 UNIT: 100 INJECTION, SOLUTION INTRAVENOUS; SUBCUTANEOUS at 20:24

## 2017-12-27 RX ADMIN — ACETAMINOPHEN 1 MG: 325 TABLET, FILM COATED ORAL at 20:21

## 2017-12-27 RX ADMIN — LEVETIRACETAM 1 MG: 500 TABLET ORAL at 20:21

## 2017-12-27 RX ADMIN — VANCOMYCIN HYDROCHLORIDE 1 MLS/HR: 500 INJECTION, POWDER, LYOPHILIZED, FOR SOLUTION INTRAVENOUS at 20:21

## 2017-12-27 RX ADMIN — PIPERACILLIN AND TAZOBACTAM 1 MLS/HR: 2; .25 INJECTION, POWDER, FOR SOLUTION INTRAVENOUS at 22:00

## 2017-12-27 RX ADMIN — PIPERACILLIN AND TAZOBACTAM 1 MLS/HR: 2; .25 INJECTION, POWDER, FOR SOLUTION INTRAVENOUS at 07:00

## 2017-12-27 RX ADMIN — PROPOFOL 1 MLS/HR: 10 INJECTION, EMULSION INTRAVENOUS at 17:02

## 2017-12-27 RX ADMIN — INSULIN ASPART 1 UNIT: 100 INJECTION, SOLUTION INTRAVENOUS; SUBCUTANEOUS at 07:35

## 2017-12-27 RX ADMIN — EPOETIN ALFA 1 UNITS: 4000 SOLUTION INTRAVENOUS; SUBCUTANEOUS at 18:48

## 2017-12-28 LAB
ABNORMAL IP MESSAGE: 1
ADD MAN DIFF?: NO
ANION GAP: 14 (ref 8–16)
BASOPHIL #: 0.1 10^3/UL (ref 0–0.1)
BASOPHILS %: 1.3 % (ref 0–2)
BLOOD UREA NITROGEN: 49 MG/DL (ref 7–20)
CALCIUM: 8.6 MG/DL (ref 8.4–10.2)
CARBON DIOXIDE: 27 MMOL/L (ref 21–31)
CHLORIDE: 94 MMOL/L (ref 97–110)
CREATININE: 3.63 MG/DL (ref 0.61–1.24)
EOSINOPHILS #: 0.6 10^3/UL (ref 0–0.5)
EOSINOPHILS %: 12.8 % (ref 0–7)
GLUCOSE: 75 MG/DL (ref 70–220)
HEMATOCRIT: 36.5 % (ref 42–52)
HEMOGLOBIN: 11.5 G/DL (ref 14–18)
LYMPHOCYTES #: 1 10^3/UL (ref 0.8–2.9)
LYMPHOCYTES %: 21.7 % (ref 15–51)
MEAN CORPUSCULAR HEMOGLOBIN: 30.5 PG (ref 29–33)
MEAN CORPUSCULAR HGB CONC: 31.5 G/DL (ref 32–37)
MEAN CORPUSCULAR VOLUME: 96.8 FL (ref 82–101)
MONOCYTE #: 0.5 10^3/UL (ref 0.3–0.9)
MONOCYTES %: 11.9 % (ref 0–11)
NEUTROPHIL #: 2.3 10^3/UL (ref 1.6–7.5)
NEUTROPHILS %: 52.1 % (ref 39–77)
NUCLEATED RED BLOOD CELLS #: 0 10^3/UL (ref 0–0)
NUCLEATED RED BLOOD CELLS%: 0 /100WBC (ref 0–0)
PLATELET COUNT: 91 10^3/UL (ref 140–415)
POSITIVE DIFF: (no result)
POTASSIUM: 3.8 MMOL/L (ref 3.5–5.1)
RED BLOOD COUNT: 3.77 10^6/UL (ref 4.7–6.1)
RED CELL DISTRIBUTION WIDTH: 18.1 % (ref 11.5–14.5)
SODIUM: 131 MMOL/L (ref 135–144)
WHITE BLOOD COUNT: 4.5 10^3/UL (ref 4.8–10.8)

## 2017-12-28 RX ADMIN — PROPOFOL 1 MLS/HR: 10 INJECTION, EMULSION INTRAVENOUS at 01:07

## 2017-12-28 RX ADMIN — PROPOFOL 1 MLS/HR: 10 INJECTION, EMULSION INTRAVENOUS at 09:23

## 2017-12-28 RX ADMIN — LEVETIRACETAM 1 MG: 500 TABLET ORAL at 20:50

## 2017-12-28 RX ADMIN — FAMOTIDINE 1 MG: 20 TABLET ORAL at 08:31

## 2017-12-28 RX ADMIN — INSULIN ASPART 1 UNIT: 100 INJECTION, SOLUTION INTRAVENOUS; SUBCUTANEOUS at 13:00

## 2017-12-28 RX ADMIN — PIPERACILLIN AND TAZOBACTAM 1 MLS/HR: 2; .25 INJECTION, POWDER, FOR SOLUTION INTRAVENOUS at 22:39

## 2017-12-28 RX ADMIN — LEVETIRACETAM 1 MG: 500 TABLET ORAL at 08:31

## 2017-12-28 RX ADMIN — INSULIN ASPART 1 UNIT: 100 INJECTION, SOLUTION INTRAVENOUS; SUBCUTANEOUS at 17:00

## 2017-12-28 RX ADMIN — PIPERACILLIN AND TAZOBACTAM 1 MLS/HR: 2; .25 INJECTION, POWDER, FOR SOLUTION INTRAVENOUS at 14:10

## 2017-12-28 RX ADMIN — PIPERACILLIN AND TAZOBACTAM 1 MLS/HR: 2; .25 INJECTION, POWDER, FOR SOLUTION INTRAVENOUS at 05:35

## 2017-12-28 RX ADMIN — INSULIN ASPART 1 UNIT: 100 INJECTION, SOLUTION INTRAVENOUS; SUBCUTANEOUS at 08:38

## 2017-12-28 RX ADMIN — INSULIN ASPART 1 UNIT: 100 INJECTION, SOLUTION INTRAVENOUS; SUBCUTANEOUS at 20:53

## 2017-12-28 RX ADMIN — INSULIN ASPART 1 UNIT: 100 INJECTION, SOLUTION INTRAVENOUS; SUBCUTANEOUS at 05:36

## 2017-12-28 RX ADMIN — MORPHINE SULFATE 1 MG: 2 INJECTION, SOLUTION INTRAMUSCULAR; INTRAVENOUS at 05:36

## 2017-12-29 LAB
ABNORMAL IP MESSAGE: 1
ADD MAN DIFF?: NO
ANION GAP: 19 (ref 8–16)
BASOPHIL #: 0.1 10^3/UL (ref 0–0.1)
BASOPHILS %: 1.9 % (ref 0–2)
BLOOD UREA NITROGEN: 59 MG/DL (ref 7–20)
CALCIUM: 8.9 MG/DL (ref 8.4–10.2)
CARBON DIOXIDE: 28 MMOL/L (ref 21–31)
CHLORIDE: 91 MMOL/L (ref 97–110)
CREATININE: 4.54 MG/DL (ref 0.61–1.24)
EOSINOPHILS #: 0.6 10^3/UL (ref 0–0.5)
EOSINOPHILS %: 13.3 % (ref 0–7)
GLUCOSE: 94 MG/DL (ref 70–220)
HEMATOCRIT: 38.7 % (ref 42–52)
HEMOGLOBIN: 11.8 G/DL (ref 14–18)
LYMPHOCYTES #: 0.9 10^3/UL (ref 0.8–2.9)
LYMPHOCYTES %: 21.4 % (ref 15–51)
MAGNESIUM: 2.6 MG/DL (ref 1.7–2.5)
MEAN CORPUSCULAR HEMOGLOBIN: 30.6 PG (ref 29–33)
MEAN CORPUSCULAR HGB CONC: 30.5 G/DL (ref 32–37)
MEAN CORPUSCULAR VOLUME: 100.3 FL (ref 82–101)
MONOCYTE #: 0.5 10^3/UL (ref 0.3–0.9)
MONOCYTES %: 12.4 % (ref 0–11)
NEUTROPHIL #: 2.2 10^3/UL (ref 1.6–7.5)
NEUTROPHILS %: 50.8 % (ref 39–77)
NUCLEATED RED BLOOD CELLS #: 0 10^3/UL (ref 0–0)
NUCLEATED RED BLOOD CELLS%: 0 /100WBC (ref 0–0)
PHOSPHORUS: 8 MG/DL (ref 2.5–4.9)
PLATELET COUNT: 127 10^3/UL (ref 140–415)
POSITIVE DIFF: (no result)
POTASSIUM: 4.1 MMOL/L (ref 3.5–5.1)
RED BLOOD COUNT: 3.86 10^6/UL (ref 4.7–6.1)
RED CELL DISTRIBUTION WIDTH: 18.1 % (ref 11.5–14.5)
SODIUM: 134 MMOL/L (ref 135–144)
WHITE BLOOD COUNT: 4.3 10^3/UL (ref 4.8–10.8)

## 2017-12-29 RX ADMIN — PIPERACILLIN AND TAZOBACTAM 1 MLS/HR: 2; .25 INJECTION, POWDER, FOR SOLUTION INTRAVENOUS at 22:41

## 2017-12-29 RX ADMIN — INSULIN ASPART 1 UNIT: 100 INJECTION, SOLUTION INTRAVENOUS; SUBCUTANEOUS at 09:00

## 2017-12-29 RX ADMIN — INSULIN ASPART 1 UNIT: 100 INJECTION, SOLUTION INTRAVENOUS; SUBCUTANEOUS at 17:00

## 2017-12-29 RX ADMIN — PROPOFOL 1 MLS/HR: 10 INJECTION, EMULSION INTRAVENOUS at 13:06

## 2017-12-29 RX ADMIN — SEVELAMER CARBONATE 1 GM: 2400 POWDER, FOR SUSPENSION ORAL at 09:22

## 2017-12-29 RX ADMIN — PIPERACILLIN AND TAZOBACTAM 1 MLS/HR: 2; .25 INJECTION, POWDER, FOR SOLUTION INTRAVENOUS at 06:23

## 2017-12-29 RX ADMIN — INSULIN ASPART 1 UNIT: 100 INJECTION, SOLUTION INTRAVENOUS; SUBCUTANEOUS at 13:00

## 2017-12-29 RX ADMIN — INSULIN ASPART 1 UNIT: 100 INJECTION, SOLUTION INTRAVENOUS; SUBCUTANEOUS at 01:00

## 2017-12-29 RX ADMIN — INSULIN ASPART 1 UNIT: 100 INJECTION, SOLUTION INTRAVENOUS; SUBCUTANEOUS at 04:51

## 2017-12-29 RX ADMIN — LEVETIRACETAM 1 MG: 500 TABLET ORAL at 21:54

## 2017-12-29 RX ADMIN — LEVETIRACETAM 1 MG: 500 TABLET ORAL at 09:22

## 2017-12-29 RX ADMIN — EPOETIN ALFA 1 UNITS: 4000 SOLUTION INTRAVENOUS; SUBCUTANEOUS at 17:19

## 2017-12-29 RX ADMIN — HEPARIN SODIUM 1 UNIT: 5000 INJECTION, SOLUTION INTRAVENOUS; SUBCUTANEOUS at 22:44

## 2017-12-29 RX ADMIN — INSULIN ASPART 1 UNIT: 100 INJECTION, SOLUTION INTRAVENOUS; SUBCUTANEOUS at 21:00

## 2017-12-29 RX ADMIN — PROPOFOL 1 MLS/HR: 10 INJECTION, EMULSION INTRAVENOUS at 04:07

## 2017-12-29 RX ADMIN — SEVELAMER CARBONATE 1 GM: 2400 POWDER, FOR SUSPENSION ORAL at 17:19

## 2017-12-29 RX ADMIN — SEVELAMER CARBONATE 1 GM: 2400 POWDER, FOR SUSPENSION ORAL at 11:30

## 2017-12-29 RX ADMIN — PIPERACILLIN AND TAZOBACTAM 1 MLS/HR: 2; .25 INJECTION, POWDER, FOR SOLUTION INTRAVENOUS at 15:03

## 2017-12-29 RX ADMIN — HEPARIN SODIUM 1 UNIT: 5000 INJECTION, SOLUTION INTRAVENOUS; SUBCUTANEOUS at 15:03

## 2017-12-30 LAB
ABNORMAL IP MESSAGE: 1
ADD MAN DIFF?: NO
ANION GAP: 20 (ref 8–16)
BASOPHIL #: 0.1 10^3/UL (ref 0–0.1)
BASOPHILS %: 1.9 % (ref 0–2)
BLOOD UREA NITROGEN: 47 MG/DL (ref 7–20)
CALCIUM: 8.9 MG/DL (ref 8.4–10.2)
CARBON DIOXIDE: 25 MMOL/L (ref 21–31)
CHLORIDE: 92 MMOL/L (ref 97–110)
CREATININE: 3.81 MG/DL (ref 0.61–1.24)
EOSINOPHILS #: 0.4 10^3/UL (ref 0–0.5)
EOSINOPHILS %: 11.4 % (ref 0–7)
GLUCOSE: 83 MG/DL (ref 70–220)
HEMATOCRIT: 35.2 % (ref 42–52)
HEMOGLOBIN: 11.4 G/DL (ref 14–18)
LYMPHOCYTES #: 0.8 10^3/UL (ref 0.8–2.9)
LYMPHOCYTES %: 22.2 % (ref 15–51)
MAGNESIUM: 2.3 MG/DL (ref 1.7–2.5)
MEAN CORPUSCULAR HEMOGLOBIN: 31.6 PG (ref 29–33)
MEAN CORPUSCULAR HGB CONC: 32.4 G/DL (ref 32–37)
MEAN CORPUSCULAR VOLUME: 97.5 FL (ref 82–101)
MONOCYTE #: 0.4 10^3/UL (ref 0.3–0.9)
MONOCYTES %: 11.1 % (ref 0–11)
NEUTROPHIL #: 1.9 10^3/UL (ref 1.6–7.5)
NEUTROPHILS %: 53.1 % (ref 39–77)
NUCLEATED RED BLOOD CELLS #: 0 10^3/UL (ref 0–0)
NUCLEATED RED BLOOD CELLS%: 0 /100WBC (ref 0–0)
PHOSPHORUS: 6.8 MG/DL (ref 2.5–4.9)
PLATELET COUNT: 98 10^3/UL (ref 140–415)
POSITIVE DIFF: (no result)
POTASSIUM: 4.1 MMOL/L (ref 3.5–5.1)
RED BLOOD COUNT: 3.61 10^6/UL (ref 4.7–6.1)
RED CELL DISTRIBUTION WIDTH: 17.8 % (ref 11.5–14.5)
SODIUM: 133 MMOL/L (ref 135–144)
WHITE BLOOD COUNT: 3.6 10^3/UL (ref 4.8–10.8)

## 2017-12-30 RX ADMIN — MUPIROCIN 1 APPLIC: 20 OINTMENT TOPICAL at 20:47

## 2017-12-30 RX ADMIN — COLISTIMETHATE SODIUM 1 MG: 150 INJECTION, POWDER, FOR SOLUTION INTRAMUSCULAR; INTRAVENOUS at 23:35

## 2017-12-30 RX ADMIN — SEVELAMER CARBONATE 1 GM: 2400 POWDER, FOR SUSPENSION ORAL at 12:54

## 2017-12-30 RX ADMIN — MUPIROCIN 1 APPLIC: 20 OINTMENT TOPICAL at 14:08

## 2017-12-30 RX ADMIN — INSULIN ASPART 1 UNIT: 100 INJECTION, SOLUTION INTRAVENOUS; SUBCUTANEOUS at 05:00

## 2017-12-30 RX ADMIN — INSULIN ASPART 1 UNIT: 100 INJECTION, SOLUTION INTRAVENOUS; SUBCUTANEOUS at 20:54

## 2017-12-30 RX ADMIN — LEVETIRACETAM 1 MG: 500 TABLET ORAL at 20:46

## 2017-12-30 RX ADMIN — HEPARIN SODIUM 1 UNIT: 5000 INJECTION, SOLUTION INTRAVENOUS; SUBCUTANEOUS at 20:49

## 2017-12-30 RX ADMIN — FAMOTIDINE 1 MG: 20 TABLET ORAL at 09:23

## 2017-12-30 RX ADMIN — PIPERACILLIN AND TAZOBACTAM 1 MLS/HR: 2; .25 INJECTION, POWDER, FOR SOLUTION INTRAVENOUS at 14:08

## 2017-12-30 RX ADMIN — INSULIN ASPART 1 UNIT: 100 INJECTION, SOLUTION INTRAVENOUS; SUBCUTANEOUS at 09:00

## 2017-12-30 RX ADMIN — SEVELAMER CARBONATE 1 GM: 2400 POWDER, FOR SUSPENSION ORAL at 09:21

## 2017-12-30 RX ADMIN — INSULIN ASPART 1 UNIT: 100 INJECTION, SOLUTION INTRAVENOUS; SUBCUTANEOUS at 17:00

## 2017-12-30 RX ADMIN — SEVELAMER CARBONATE 1 GM: 2400 POWDER, FOR SUSPENSION ORAL at 18:32

## 2017-12-30 RX ADMIN — PROPOFOL 1 MLS/HR: 10 INJECTION, EMULSION INTRAVENOUS at 18:37

## 2017-12-30 RX ADMIN — MUPIROCIN 1 APPLIC: 20 OINTMENT TOPICAL at 20:46

## 2017-12-30 RX ADMIN — MORPHINE SULFATE 1 MG: 2 INJECTION, SOLUTION INTRAMUSCULAR; INTRAVENOUS at 04:18

## 2017-12-30 RX ADMIN — INSULIN ASPART 1 UNIT: 100 INJECTION, SOLUTION INTRAVENOUS; SUBCUTANEOUS at 01:00

## 2017-12-30 RX ADMIN — PROPOFOL 1 MLS/HR: 10 INJECTION, EMULSION INTRAVENOUS at 01:56

## 2017-12-30 RX ADMIN — HEPARIN SODIUM 1 UNIT: 5000 INJECTION, SOLUTION INTRAVENOUS; SUBCUTANEOUS at 09:31

## 2017-12-30 RX ADMIN — LEVETIRACETAM 1 MG: 500 TABLET ORAL at 09:23

## 2017-12-30 RX ADMIN — PIPERACILLIN AND TAZOBACTAM 1 MLS/HR: 2; .25 INJECTION, POWDER, FOR SOLUTION INTRAVENOUS at 22:00

## 2017-12-30 RX ADMIN — PIPERACILLIN AND TAZOBACTAM 1 MLS/HR: 2; .25 INJECTION, POWDER, FOR SOLUTION INTRAVENOUS at 06:05

## 2017-12-30 RX ADMIN — HYDRALAZINE HYDROCHLORIDE 1 MG: 20 INJECTION INTRAMUSCULAR; INTRAVENOUS at 23:49

## 2017-12-30 RX ADMIN — INSULIN ASPART 1 UNIT: 100 INJECTION, SOLUTION INTRAVENOUS; SUBCUTANEOUS at 13:00

## 2017-12-30 RX ADMIN — HYDRALAZINE HYDROCHLORIDE 1 MG: 20 INJECTION INTRAMUSCULAR; INTRAVENOUS at 09:30

## 2017-12-31 LAB
ABNORMAL IP MESSAGE: 1
ADD MAN DIFF?: NO
ALANINE AMINOTRANSFERASE: 42 IU/L (ref 13–69)
ALBUMIN/GLOBULIN RATIO: 0.75
ALBUMIN: 3.3 G/DL (ref 3.3–4.9)
ALKALINE PHOSPHATASE: 388 IU/L (ref 42–121)
ANION GAP: 19 (ref 8–16)
ARTERIAL BASE EXCESS: 4 MMOL/L (ref -3–3)
ARTERIAL BLOOD GAS OXYGEN SAT: 96.7 MMHG (ref 95–98)
ARTERIAL COHB: 0.9 % (ref 0–3)
ARTERIAL FRACTION OF OXYHGB: 95.5 % (ref 93–99)
ARTERIAL HCO3: 30.2 MMOL/L (ref 22–26)
ARTERIAL METHB: 0.3 % (ref 0–1.5)
ARTERIAL PCO2: 52.5 MMHG (ref 35–45)
ARTERIAL TOTAL HEMGLOBIN: 13.1 G/DL (ref 12–18)
ASPARTATE AMINO TRANSFERASE: 41 IU/L (ref 15–46)
BASOPHIL #: 0.1 10^3/UL (ref 0–0.1)
BASOPHILS %: 1.4 % (ref 0–2)
BILIRUBIN,DIRECT: 0 MG/DL (ref 0–0.2)
BILIRUBIN,TOTAL: 0 MG/DL (ref 0.2–1.3)
BLOOD GAS LOW PEEP SETTING: 5 CMH2O
BLOOD GAS TIDAL VOLUME: 500 ML
BLOOD UREA NITROGEN: 56 MG/DL (ref 7–20)
CALCIUM: 9 MG/DL (ref 8.4–10.2)
CARBON DIOXIDE: 25 MMOL/L (ref 21–31)
CHLORIDE: 91 MMOL/L (ref 97–110)
CREATININE: 5.02 MG/DL (ref 0.61–1.24)
EOSINOPHILS #: 0.5 10^3/UL (ref 0–0.5)
EOSINOPHILS %: 10.1 % (ref 0–7)
FIO2: 30 %
GLOBULIN: 4.4 G/DL (ref 1.3–3.2)
GLUCOSE: 98 MG/DL (ref 70–220)
HEMATOCRIT: 36 % (ref 42–52)
HEMOGLOBIN: 11.2 G/DL (ref 14–18)
LYMPHOCYTES #: 0.8 10^3/UL (ref 0.8–2.9)
LYMPHOCYTES %: 15.8 % (ref 15–51)
MEAN CORPUSCULAR HEMOGLOBIN: 30.4 PG (ref 29–33)
MEAN CORPUSCULAR HGB CONC: 31.1 G/DL (ref 32–37)
MEAN CORPUSCULAR VOLUME: 97.8 FL (ref 82–101)
MODE: (no result)
MONOCYTE #: 0.5 10^3/UL (ref 0.3–0.9)
MONOCYTES %: 10.1 % (ref 0–11)
NEUTROPHIL #: 3 10^3/UL (ref 1.6–7.5)
NEUTROPHILS %: 62.2 % (ref 39–77)
NUCLEATED RED BLOOD CELLS #: 0 10^3/UL (ref 0–0)
NUCLEATED RED BLOOD CELLS%: 0 /100WBC (ref 0–0)
O2 A-A PPRESDIFF RESPIRATORY: 56 MMHG (ref 7–24)
PLATELET COUNT: 109 10^3/UL (ref 140–415)
POSITIVE DIFF: (no result)
POTASSIUM: 4.5 MMOL/L (ref 3.5–5.1)
RED BLOOD COUNT: 3.68 10^6/UL (ref 4.7–6.1)
RED CELL DISTRIBUTION WIDTH: 18 % (ref 11.5–14.5)
SODIUM: 130 MMOL/L (ref 135–144)
TOTAL PROTEIN: 7.7 G/DL (ref 6.1–8.1)
WHITE BLOOD COUNT: 4.9 10^3/UL (ref 4.8–10.8)

## 2017-12-31 RX ADMIN — INSULIN ASPART 1 UNIT: 100 INJECTION, SOLUTION INTRAVENOUS; SUBCUTANEOUS at 21:00

## 2017-12-31 RX ADMIN — INSULIN ASPART 1 UNIT: 100 INJECTION, SOLUTION INTRAVENOUS; SUBCUTANEOUS at 12:29

## 2017-12-31 RX ADMIN — INSULIN ASPART 1 UNIT: 100 INJECTION, SOLUTION INTRAVENOUS; SUBCUTANEOUS at 05:00

## 2017-12-31 RX ADMIN — PIPERACILLIN AND TAZOBACTAM 1 MLS/HR: 2; .25 INJECTION, POWDER, FOR SOLUTION INTRAVENOUS at 05:20

## 2017-12-31 RX ADMIN — INSULIN ASPART 1 UNIT: 100 INJECTION, SOLUTION INTRAVENOUS; SUBCUTANEOUS at 01:00

## 2017-12-31 RX ADMIN — HEPARIN SODIUM 1 UNIT: 5000 INJECTION, SOLUTION INTRAVENOUS; SUBCUTANEOUS at 09:56

## 2017-12-31 RX ADMIN — PIPERACILLIN AND TAZOBACTAM 1 MLS/HR: 2; .25 INJECTION, POWDER, FOR SOLUTION INTRAVENOUS at 14:25

## 2017-12-31 RX ADMIN — HEPARIN SODIUM 1 UNIT: 5000 INJECTION, SOLUTION INTRAVENOUS; SUBCUTANEOUS at 21:00

## 2017-12-31 RX ADMIN — INSULIN ASPART 1 UNIT: 100 INJECTION, SOLUTION INTRAVENOUS; SUBCUTANEOUS at 09:00

## 2017-12-31 RX ADMIN — SEVELAMER CARBONATE 1 GM: 2400 POWDER, FOR SUSPENSION ORAL at 12:40

## 2017-12-31 RX ADMIN — MUPIROCIN 1 APPLIC: 20 OINTMENT TOPICAL at 10:00

## 2017-12-31 RX ADMIN — MUPIROCIN 1 APPLIC: 20 OINTMENT TOPICAL at 10:01

## 2017-12-31 RX ADMIN — HYDRALAZINE HYDROCHLORIDE 1 MG: 20 INJECTION INTRAMUSCULAR; INTRAVENOUS at 12:40

## 2017-12-31 RX ADMIN — COLISTIMETHATE SODIUM 1 MG: 150 INJECTION, POWDER, FOR SOLUTION INTRAMUSCULAR; INTRAVENOUS at 19:55

## 2017-12-31 RX ADMIN — MUPIROCIN 1 APPLIC: 20 OINTMENT TOPICAL at 21:00

## 2017-12-31 RX ADMIN — MUPIROCIN 1 APPLIC: 20 OINTMENT TOPICAL at 21:46

## 2017-12-31 RX ADMIN — PIPERACILLIN AND TAZOBACTAM 1 MLS/HR: 2; .25 INJECTION, POWDER, FOR SOLUTION INTRAVENOUS at 21:46

## 2017-12-31 RX ADMIN — LEVETIRACETAM 1 MG: 500 TABLET ORAL at 21:44

## 2017-12-31 RX ADMIN — SEVELAMER CARBONATE 1 GM: 2400 POWDER, FOR SUSPENSION ORAL at 17:14

## 2017-12-31 RX ADMIN — VANCOMYCIN HYDROCHLORIDE 1 MLS/HR: 500 INJECTION, POWDER, LYOPHILIZED, FOR SOLUTION INTRAVENOUS at 21:00

## 2017-12-31 RX ADMIN — COLISTIMETHATE SODIUM 1 MG: 150 INJECTION, POWDER, FOR SOLUTION INTRAMUSCULAR; INTRAVENOUS at 09:00

## 2017-12-31 RX ADMIN — SEVELAMER CARBONATE 1 GM: 2400 POWDER, FOR SUSPENSION ORAL at 09:30

## 2017-12-31 RX ADMIN — PROPOFOL 1 MLS/HR: 10 INJECTION, EMULSION INTRAVENOUS at 10:30

## 2017-12-31 RX ADMIN — LEVETIRACETAM 1 MG: 500 TABLET ORAL at 09:30

## 2017-12-31 RX ADMIN — INSULIN ASPART 1 UNIT: 100 INJECTION, SOLUTION INTRAVENOUS; SUBCUTANEOUS at 17:00

## 2018-01-01 LAB
ABNORMAL IP MESSAGE: 1
ADD MAN DIFF?: NO
ANION GAP: 18 (ref 8–16)
BASOPHIL #: 0.1 10^3/UL (ref 0–0.1)
BASOPHILS %: 1.6 % (ref 0–2)
BLOOD UREA NITROGEN: 42 MG/DL (ref 7–20)
CALCIUM: 9 MG/DL (ref 8.4–10.2)
CARBON DIOXIDE: 27 MMOL/L (ref 21–31)
CHLORIDE: 101 MMOL/L (ref 97–110)
CREATININE: 4.33 MG/DL (ref 0.61–1.24)
EOSINOPHILS #: 0.5 10^3/UL (ref 0–0.5)
EOSINOPHILS %: 10.4 % (ref 0–7)
GLUCOSE: 106 MG/DL (ref 70–220)
HEMATOCRIT: 37.2 % (ref 42–52)
HEMOGLOBIN: 11.2 G/DL (ref 14–18)
LYMPHOCYTES #: 0.8 10^3/UL (ref 0.8–2.9)
LYMPHOCYTES %: 16.3 % (ref 15–51)
MEAN CORPUSCULAR HEMOGLOBIN: 30.5 PG (ref 29–33)
MEAN CORPUSCULAR HGB CONC: 30.1 G/DL (ref 32–37)
MEAN CORPUSCULAR VOLUME: 101.4 FL (ref 82–101)
MONOCYTE #: 0.6 10^3/UL (ref 0.3–0.9)
MONOCYTES %: 12.2 % (ref 0–11)
NEUTROPHIL #: 3 10^3/UL (ref 1.6–7.5)
NEUTROPHILS %: 59.3 % (ref 39–77)
NUCLEATED RED BLOOD CELLS #: 0 10^3/UL (ref 0–0)
NUCLEATED RED BLOOD CELLS%: 0 /100WBC (ref 0–0)
PLATELET COUNT: 127 10^3/UL (ref 140–415)
POSITIVE DIFF: (no result)
POTASSIUM: 4.1 MMOL/L (ref 3.5–5.1)
RED BLOOD COUNT: 3.67 10^6/UL (ref 4.7–6.1)
RED CELL DISTRIBUTION WIDTH: 18.4 % (ref 11.5–14.5)
SODIUM: 142 MMOL/L (ref 135–144)
WHITE BLOOD COUNT: 5.1 10^3/UL (ref 4.8–10.8)

## 2018-01-01 RX ADMIN — SEVELAMER CARBONATE 1 GM: 2400 POWDER, FOR SUSPENSION ORAL at 07:56

## 2018-01-01 RX ADMIN — LEVETIRACETAM 1 MG: 500 TABLET ORAL at 07:56

## 2018-01-01 RX ADMIN — HEPARIN SODIUM 1 UNIT: 5000 INJECTION, SOLUTION INTRAVENOUS; SUBCUTANEOUS at 21:25

## 2018-01-01 RX ADMIN — INSULIN ASPART 1 UNIT: 100 INJECTION, SOLUTION INTRAVENOUS; SUBCUTANEOUS at 17:00

## 2018-01-01 RX ADMIN — PIPERACILLIN AND TAZOBACTAM 1 MLS/HR: 2; .25 INJECTION, POWDER, FOR SOLUTION INTRAVENOUS at 05:20

## 2018-01-01 RX ADMIN — INSULIN ASPART 1 UNIT: 100 INJECTION, SOLUTION INTRAVENOUS; SUBCUTANEOUS at 01:00

## 2018-01-01 RX ADMIN — FAMOTIDINE 1 MG: 20 TABLET ORAL at 08:46

## 2018-01-01 RX ADMIN — MUPIROCIN 1 APPLIC: 20 OINTMENT TOPICAL at 21:21

## 2018-01-01 RX ADMIN — INSULIN ASPART 1 UNIT: 100 INJECTION, SOLUTION INTRAVENOUS; SUBCUTANEOUS at 08:46

## 2018-01-01 RX ADMIN — COLISTIMETHATE SODIUM 1 MG: 150 INJECTION, POWDER, FOR SOLUTION INTRAMUSCULAR; INTRAVENOUS at 19:45

## 2018-01-01 RX ADMIN — PIPERACILLIN AND TAZOBACTAM 1 MLS/HR: 2; .25 INJECTION, POWDER, FOR SOLUTION INTRAVENOUS at 13:36

## 2018-01-01 RX ADMIN — INSULIN ASPART 1 UNIT: 100 INJECTION, SOLUTION INTRAVENOUS; SUBCUTANEOUS at 12:37

## 2018-01-01 RX ADMIN — PROPOFOL 1 MLS/HR: 10 INJECTION, EMULSION INTRAVENOUS at 10:30

## 2018-01-01 RX ADMIN — COLISTIMETHATE SODIUM 1 MG: 150 INJECTION, POWDER, FOR SOLUTION INTRAMUSCULAR; INTRAVENOUS at 09:26

## 2018-01-01 RX ADMIN — HEPARIN SODIUM 1 UNIT: 5000 INJECTION, SOLUTION INTRAVENOUS; SUBCUTANEOUS at 08:47

## 2018-01-01 RX ADMIN — LEVETIRACETAM 1 MG: 500 TABLET ORAL at 21:20

## 2018-01-01 RX ADMIN — PROPOFOL 1 MLS/HR: 10 INJECTION, EMULSION INTRAVENOUS at 01:49

## 2018-01-01 RX ADMIN — SEVELAMER CARBONATE 1 GM: 2400 POWDER, FOR SUSPENSION ORAL at 11:30

## 2018-01-01 RX ADMIN — MUPIROCIN 1 APPLIC: 20 OINTMENT TOPICAL at 21:00

## 2018-01-01 RX ADMIN — MUPIROCIN 1 APPLIC: 20 OINTMENT TOPICAL at 07:57

## 2018-01-01 RX ADMIN — SEVELAMER CARBONATE 1 GM: 2400 POWDER, FOR SUSPENSION ORAL at 17:16

## 2018-01-01 RX ADMIN — PROPOFOL 1 MLS/HR: 10 INJECTION, EMULSION INTRAVENOUS at 21:59

## 2018-01-01 RX ADMIN — INSULIN ASPART 1 UNIT: 100 INJECTION, SOLUTION INTRAVENOUS; SUBCUTANEOUS at 21:00

## 2018-01-01 RX ADMIN — INSULIN ASPART 1 UNIT: 100 INJECTION, SOLUTION INTRAVENOUS; SUBCUTANEOUS at 05:00

## 2018-01-01 RX ADMIN — PIPERACILLIN AND TAZOBACTAM 1 MLS/HR: 2; .25 INJECTION, POWDER, FOR SOLUTION INTRAVENOUS at 21:21

## 2018-01-02 LAB
ADD MAN DIFF?: NO
ANION GAP: 22 (ref 8–16)
BASOPHIL #: 0.1 10^3/UL (ref 0–0.1)
BASOPHILS %: 1.4 % (ref 0–2)
BLOOD UREA NITROGEN: 55 MG/DL (ref 7–20)
CALCIUM: 9.3 MG/DL (ref 8.4–10.2)
CARBON DIOXIDE: 26 MMOL/L (ref 21–31)
CHLORIDE: 99 MMOL/L (ref 97–110)
CREATININE: 5.05 MG/DL (ref 0.61–1.24)
EOSINOPHILS #: 1.1 10^3/UL (ref 0–0.5)
EOSINOPHILS %: 21.3 % (ref 0–7)
GLUCOSE: 78 MG/DL (ref 70–220)
HEMATOCRIT: 35.5 % (ref 42–52)
HEMOGLOBIN A1C: 4.5 % (ref 0–5.9)
HEMOGLOBIN: 11 G/DL (ref 14–18)
LYMPHOCYTES #: 0.8 10^3/UL (ref 0.8–2.9)
LYMPHOCYTES %: 16.5 % (ref 15–51)
MEAN CORPUSCULAR HEMOGLOBIN: 30.7 PG (ref 29–33)
MEAN CORPUSCULAR HGB CONC: 31 G/DL (ref 32–37)
MEAN CORPUSCULAR VOLUME: 99.2 FL (ref 82–101)
MEAN PLATELET VOLUME: 11.6 FL (ref 7.4–10.4)
MONOCYTE #: 0.6 10^3/UL (ref 0.3–0.9)
MONOCYTES %: 11.1 % (ref 0–11)
NEUTROPHIL #: 2.5 10^3/UL (ref 1.6–7.5)
NEUTROPHILS %: 49.5 % (ref 39–77)
NUCLEATED RED BLOOD CELLS #: 0 10^3/UL (ref 0–0)
NUCLEATED RED BLOOD CELLS%: 0 /100WBC (ref 0–0)
PLATELET COUNT: 108 10^3/UL (ref 140–415)
POSITIVE DIFF: (no result)
POTASSIUM: 4.9 MMOL/L (ref 3.5–5.1)
RED BLOOD COUNT: 3.58 10^6/UL (ref 4.7–6.1)
RED CELL DISTRIBUTION WIDTH: 18.1 % (ref 11.5–14.5)
SODIUM: 142 MMOL/L (ref 135–144)
WHITE BLOOD COUNT: 5 10^3/UL (ref 4.8–10.8)

## 2018-01-02 RX ADMIN — INSULIN ASPART 1 UNIT: 100 INJECTION, SOLUTION INTRAVENOUS; SUBCUTANEOUS at 17:51

## 2018-01-02 RX ADMIN — PIPERACILLIN AND TAZOBACTAM 1 MLS/HR: 2; .25 INJECTION, POWDER, FOR SOLUTION INTRAVENOUS at 18:45

## 2018-01-02 RX ADMIN — HEPARIN SODIUM 1 UNIT: 5000 INJECTION, SOLUTION INTRAVENOUS; SUBCUTANEOUS at 08:03

## 2018-01-02 RX ADMIN — SEVELAMER CARBONATE 1 GM: 2400 POWDER, FOR SUSPENSION ORAL at 11:30

## 2018-01-02 RX ADMIN — SEVELAMER CARBONATE 1 GM: 2400 POWDER, FOR SUSPENSION ORAL at 07:35

## 2018-01-02 RX ADMIN — SEVELAMER CARBONATE 1 GM: 2400 POWDER, FOR SUSPENSION ORAL at 17:35

## 2018-01-02 RX ADMIN — LEVETIRACETAM 1 MG: 500 TABLET ORAL at 20:44

## 2018-01-02 RX ADMIN — INSULIN ASPART 1 UNIT: 100 INJECTION, SOLUTION INTRAVENOUS; SUBCUTANEOUS at 01:00

## 2018-01-02 RX ADMIN — MUPIROCIN 1 APPLIC: 20 OINTMENT TOPICAL at 08:52

## 2018-01-02 RX ADMIN — LEVETIRACETAM 1 MG: 500 TABLET ORAL at 07:57

## 2018-01-02 RX ADMIN — PIPERACILLIN AND TAZOBACTAM 1 MLS/HR: 2; .25 INJECTION, POWDER, FOR SOLUTION INTRAVENOUS at 21:52

## 2018-01-02 RX ADMIN — INSULIN ASPART 1 UNIT: 100 INJECTION, SOLUTION INTRAVENOUS; SUBCUTANEOUS at 08:51

## 2018-01-02 RX ADMIN — HEPARIN SODIUM 1 UNIT: 5000 INJECTION, SOLUTION INTRAVENOUS; SUBCUTANEOUS at 20:35

## 2018-01-02 RX ADMIN — HYDRALAZINE HYDROCHLORIDE 1 MG: 20 INJECTION INTRAMUSCULAR; INTRAVENOUS at 06:09

## 2018-01-02 RX ADMIN — MUPIROCIN 1 APPLIC: 20 OINTMENT TOPICAL at 20:44

## 2018-01-02 RX ADMIN — COLISTIMETHATE SODIUM 1 MG: 150 INJECTION, POWDER, FOR SOLUTION INTRAMUSCULAR; INTRAVENOUS at 19:12

## 2018-01-02 RX ADMIN — PROPOFOL 1 MLS/HR: 10 INJECTION, EMULSION INTRAVENOUS at 06:35

## 2018-01-02 RX ADMIN — PIPERACILLIN AND TAZOBACTAM 1 MLS/HR: 2; .25 INJECTION, POWDER, FOR SOLUTION INTRAVENOUS at 06:08

## 2018-01-02 RX ADMIN — INSULIN ASPART 1 UNIT: 100 INJECTION, SOLUTION INTRAVENOUS; SUBCUTANEOUS at 05:00

## 2018-01-02 RX ADMIN — COLISTIMETHATE SODIUM 1 MG: 150 INJECTION, POWDER, FOR SOLUTION INTRAMUSCULAR; INTRAVENOUS at 09:30

## 2018-01-03 LAB
ABNORMAL IP MESSAGE: 1
ADD MAN DIFF?: NO
ANION GAP: 20 (ref 8–16)
BASOPHIL #: 0.1 10^3/UL (ref 0–0.1)
BASOPHILS %: 1.6 % (ref 0–2)
BLOOD UREA NITROGEN: 50 MG/DL (ref 7–20)
CALCIUM: 9.1 MG/DL (ref 8.4–10.2)
CARBON DIOXIDE: 24 MMOL/L (ref 21–31)
CHLORIDE: 101 MMOL/L (ref 97–110)
CREATININE: 4.98 MG/DL (ref 0.61–1.24)
EOSINOPHILS #: 1.2 10^3/UL (ref 0–0.5)
EOSINOPHILS %: 18.5 % (ref 0–7)
GLUCOSE: 107 MG/DL (ref 70–220)
HEMATOCRIT: 35.5 % (ref 42–52)
HEMOGLOBIN: 11.4 G/DL (ref 14–18)
INR: 1.06
LYMPHOCYTES #: 0.9 10^3/UL (ref 0.8–2.9)
LYMPHOCYTES %: 14.6 % (ref 15–51)
MEAN CORPUSCULAR HEMOGLOBIN: 32.4 PG (ref 29–33)
MEAN CORPUSCULAR HGB CONC: 32.1 G/DL (ref 32–37)
MEAN CORPUSCULAR VOLUME: 100.9 FL (ref 82–101)
MONOCYTE #: 0.7 10^3/UL (ref 0.3–0.9)
MONOCYTES %: 11.6 % (ref 0–11)
NEUTROPHIL #: 3.4 10^3/UL (ref 1.6–7.5)
NEUTROPHILS %: 53.4 % (ref 39–77)
NUCLEATED RED BLOOD CELLS #: 0 10^3/UL (ref 0–0)
NUCLEATED RED BLOOD CELLS%: 0 /100WBC (ref 0–0)
PARTIAL THROMBOPLASTIN TIME: 37.1 SEC (ref 25–35)
PLATELET COUNT: 107 10^3/UL (ref 140–415)
PLATELET COUNT: 108 10^3/UL (ref 140–415)
POSITIVE DIFF: (no result)
POTASSIUM: 5.4 MMOL/L (ref 3.5–5.1)
PROTIME: 13.9 SEC (ref 11.9–14.9)
PT RATIO: 1.1
RED BLOOD COUNT: 3.52 10^6/UL (ref 4.7–6.1)
RED CELL DISTRIBUTION WIDTH: 18.5 % (ref 11.5–14.5)
SODIUM: 140 MMOL/L (ref 135–144)
THROMBIN TIME: 17.9 SEC (ref 13.8–19.1)
WHITE BLOOD COUNT: 6.4 10^3/UL (ref 4.8–10.8)

## 2018-01-03 RX ADMIN — SEVELAMER CARBONATE 1 GM: 2400 POWDER, FOR SUSPENSION ORAL at 09:50

## 2018-01-03 RX ADMIN — PROPOFOL 1 MLS/HR: 10 INJECTION, EMULSION INTRAVENOUS at 21:51

## 2018-01-03 RX ADMIN — DEXTROSE MONOHYDRATE 1 ML: 500 INJECTION PARENTERAL at 00:09

## 2018-01-03 RX ADMIN — INSULIN ASPART 1 UNIT: 100 INJECTION, SOLUTION INTRAVENOUS; SUBCUTANEOUS at 18:00

## 2018-01-03 RX ADMIN — LEVETIRACETAM 1 MG: 500 TABLET ORAL at 09:50

## 2018-01-03 RX ADMIN — MUPIROCIN 1 APPLIC: 20 OINTMENT TOPICAL at 20:40

## 2018-01-03 RX ADMIN — SEVELAMER CARBONATE 1 GM: 2400 POWDER, FOR SUSPENSION ORAL at 18:24

## 2018-01-03 RX ADMIN — INSULIN ASPART 1 UNIT: 100 INJECTION, SOLUTION INTRAVENOUS; SUBCUTANEOUS at 06:00

## 2018-01-03 RX ADMIN — PROPOFOL 1 MLS/HR: 10 INJECTION, EMULSION INTRAVENOUS at 01:56

## 2018-01-03 RX ADMIN — COLISTIMETHATE SODIUM 1 MG: 150 INJECTION, POWDER, FOR SOLUTION INTRAMUSCULAR; INTRAVENOUS at 09:00

## 2018-01-03 RX ADMIN — INSULIN ASPART 1 UNIT: 100 INJECTION, SOLUTION INTRAVENOUS; SUBCUTANEOUS at 00:00

## 2018-01-03 RX ADMIN — HEPARIN SODIUM 1 UNIT: 5000 INJECTION, SOLUTION INTRAVENOUS; SUBCUTANEOUS at 09:53

## 2018-01-03 RX ADMIN — FAMOTIDINE 1 MG: 20 TABLET ORAL at 09:51

## 2018-01-03 RX ADMIN — PIPERACILLIN AND TAZOBACTAM 1 MLS/HR: 2; .25 INJECTION, POWDER, FOR SOLUTION INTRAVENOUS at 05:58

## 2018-01-03 RX ADMIN — COLISTIMETHATE SODIUM 1 MG: 150 INJECTION, POWDER, FOR SOLUTION INTRAMUSCULAR; INTRAVENOUS at 19:29

## 2018-01-03 RX ADMIN — SEVELAMER CARBONATE 1 GM: 2400 POWDER, FOR SUSPENSION ORAL at 13:31

## 2018-01-03 RX ADMIN — PIPERACILLIN AND TAZOBACTAM 1 MLS/HR: 2; .25 INJECTION, POWDER, FOR SOLUTION INTRAVENOUS at 22:09

## 2018-01-03 RX ADMIN — HEPARIN SODIUM 1 UNIT: 5000 INJECTION, SOLUTION INTRAVENOUS; SUBCUTANEOUS at 20:46

## 2018-01-03 RX ADMIN — LEVETIRACETAM 1 MG: 500 TABLET ORAL at 20:40

## 2018-01-03 RX ADMIN — INSULIN ASPART 1 UNIT: 100 INJECTION, SOLUTION INTRAVENOUS; SUBCUTANEOUS at 12:00

## 2018-01-03 RX ADMIN — PIPERACILLIN AND TAZOBACTAM 1 MLS/HR: 2; .25 INJECTION, POWDER, FOR SOLUTION INTRAVENOUS at 14:09

## 2018-01-03 RX ADMIN — MUPIROCIN 1 APPLIC: 20 OINTMENT TOPICAL at 09:53

## 2018-01-03 RX ADMIN — PROPOFOL 1 MLS/HR: 10 INJECTION, EMULSION INTRAVENOUS at 13:42

## 2018-01-04 LAB
ABNORMAL IP MESSAGE: 1
ADD MAN DIFF?: NO
ANION GAP: 23 (ref 8–16)
BASOPHIL #: 0.1 10^3/UL (ref 0–0.1)
BASOPHILS %: 1.9 % (ref 0–2)
BLOOD UREA NITROGEN: 69 MG/DL (ref 7–20)
CALCIUM: 9.4 MG/DL (ref 8.4–10.2)
CARBON DIOXIDE: 22 MMOL/L (ref 21–31)
CHLORIDE: 99 MMOL/L (ref 97–110)
CREATININE: 5.63 MG/DL (ref 0.61–1.24)
EOSINOPHILS #: 1.1 10^3/UL (ref 0–0.5)
EOSINOPHILS %: 24.1 % (ref 0–7)
GLUCOSE: 97 MG/DL (ref 70–220)
HEMATOCRIT: 35.9 % (ref 42–52)
HEMOGLOBIN: 11.2 G/DL (ref 14–18)
LYMPHOCYTES #: 0.9 10^3/UL (ref 0.8–2.9)
LYMPHOCYTES %: 18.8 % (ref 15–51)
MAGNESIUM: 2.8 MG/DL (ref 1.7–2.5)
MEAN CORPUSCULAR HEMOGLOBIN: 31 PG (ref 29–33)
MEAN CORPUSCULAR HGB CONC: 31.2 G/DL (ref 32–37)
MEAN CORPUSCULAR VOLUME: 99.4 FL (ref 82–101)
MEAN PLATELET VOLUME: 12.7 FL (ref 7.4–10.4)
MONOCYTE #: 0.6 10^3/UL (ref 0.3–0.9)
MONOCYTES %: 13.4 % (ref 0–11)
NEUTROPHIL #: 1.9 10^3/UL (ref 1.6–7.5)
NEUTROPHILS %: 41.4 % (ref 39–77)
NUCLEATED RED BLOOD CELLS #: 0 10^3/UL (ref 0–0)
NUCLEATED RED BLOOD CELLS%: 0 /100WBC (ref 0–0)
PHOSPHORUS: 6.7 MG/DL (ref 2.5–4.9)
PLATELET COUNT: 89 10^3/UL (ref 140–415)
POSITIVE DIFF: (no result)
POTASSIUM: 5.1 MMOL/L (ref 3.5–5.1)
RED BLOOD COUNT: 3.61 10^6/UL (ref 4.7–6.1)
RED CELL DISTRIBUTION WIDTH: 18.1 % (ref 11.5–14.5)
SODIUM: 139 MMOL/L (ref 135–144)
WHITE BLOOD COUNT: 4.7 10^3/UL (ref 4.8–10.8)

## 2018-01-04 RX ADMIN — MUPIROCIN 1 APPLIC: 20 OINTMENT TOPICAL at 21:05

## 2018-01-04 RX ADMIN — COLISTIMETHATE SODIUM 1 MG: 150 INJECTION, POWDER, FOR SOLUTION INTRAMUSCULAR; INTRAVENOUS at 08:17

## 2018-01-04 RX ADMIN — HEPARIN SODIUM 1 UNIT: 5000 INJECTION, SOLUTION INTRAVENOUS; SUBCUTANEOUS at 08:33

## 2018-01-04 RX ADMIN — INSULIN ASPART 1 UNIT: 100 INJECTION, SOLUTION INTRAVENOUS; SUBCUTANEOUS at 00:00

## 2018-01-04 RX ADMIN — LEVETIRACETAM 1 MG: 500 TABLET ORAL at 11:00

## 2018-01-04 RX ADMIN — INSULIN ASPART 1 UNIT: 100 INJECTION, SOLUTION INTRAVENOUS; SUBCUTANEOUS at 17:30

## 2018-01-04 RX ADMIN — PROPOFOL 1 MLS/HR: 10 INJECTION, EMULSION INTRAVENOUS at 05:13

## 2018-01-04 RX ADMIN — PROPOFOL 1 MLS/HR: 10 INJECTION, EMULSION INTRAVENOUS at 10:05

## 2018-01-04 RX ADMIN — INSULIN ASPART 1 UNIT: 100 INJECTION, SOLUTION INTRAVENOUS; SUBCUTANEOUS at 23:34

## 2018-01-04 RX ADMIN — PIPERACILLIN AND TAZOBACTAM 1 MLS/HR: 2; .25 INJECTION, POWDER, FOR SOLUTION INTRAVENOUS at 05:55

## 2018-01-04 RX ADMIN — PROPOFOL 1 MLS/HR: 10 INJECTION, EMULSION INTRAVENOUS at 17:21

## 2018-01-04 RX ADMIN — COLISTIMETHATE SODIUM 1 MG: 150 INJECTION, POWDER, FOR SOLUTION INTRAMUSCULAR; INTRAVENOUS at 18:52

## 2018-01-04 RX ADMIN — SEVELAMER CARBONATE 1 GM: 2400 POWDER, FOR SUSPENSION ORAL at 17:25

## 2018-01-04 RX ADMIN — SEVELAMER CARBONATE 1 GM: 2400 POWDER, FOR SUSPENSION ORAL at 08:33

## 2018-01-04 RX ADMIN — HEPARIN SODIUM 1 UNIT: 5000 INJECTION, SOLUTION INTRAVENOUS; SUBCUTANEOUS at 21:04

## 2018-01-04 RX ADMIN — PROPOFOL 1 MLS/HR: 10 INJECTION, EMULSION INTRAVENOUS at 23:07

## 2018-01-04 RX ADMIN — MUPIROCIN 1 APPLIC: 20 OINTMENT TOPICAL at 08:37

## 2018-01-04 RX ADMIN — MUPIROCIN 1 APPLIC: 20 OINTMENT TOPICAL at 08:36

## 2018-01-04 RX ADMIN — INSULIN ASPART 1 UNIT: 100 INJECTION, SOLUTION INTRAVENOUS; SUBCUTANEOUS at 11:42

## 2018-01-04 RX ADMIN — HYDRALAZINE HYDROCHLORIDE 1 MG: 20 INJECTION INTRAMUSCULAR; INTRAVENOUS at 03:10

## 2018-01-04 RX ADMIN — LEVETIRACETAM 1 MG: 500 TABLET ORAL at 21:04

## 2018-01-04 RX ADMIN — SEVELAMER CARBONATE 1 GM: 2400 POWDER, FOR SUSPENSION ORAL at 10:59

## 2018-01-04 RX ADMIN — INSULIN ASPART 1 UNIT: 100 INJECTION, SOLUTION INTRAVENOUS; SUBCUTANEOUS at 06:00

## 2018-01-05 LAB
ABNORMAL IP MESSAGE: 1
ADD MAN DIFF?: NO
ANION GAP: 18 (ref 8–16)
BASOPHIL #: 0.1 10^3/UL (ref 0–0.1)
BASOPHILS %: 1.2 % (ref 0–2)
BLOOD UREA NITROGEN: 57 MG/DL (ref 7–20)
CALCIUM: 9.2 MG/DL (ref 8.4–10.2)
CARBON DIOXIDE: 27 MMOL/L (ref 21–31)
CHLORIDE: 92 MMOL/L (ref 97–110)
CREATININE: 4.17 MG/DL (ref 0.61–1.24)
EOSINOPHILS #: 1 10^3/UL (ref 0–0.5)
EOSINOPHILS %: 23.1 % (ref 0–7)
GLUCOSE: 91 MG/DL (ref 70–220)
HEMATOCRIT: 35.7 % (ref 42–52)
HEMOGLOBIN: 11.2 G/DL (ref 14–18)
LYMPHOCYTES #: 0.7 10^3/UL (ref 0.8–2.9)
LYMPHOCYTES %: 17.5 % (ref 15–51)
MAGNESIUM: 2.5 MG/DL (ref 1.7–2.5)
MEAN CORPUSCULAR HEMOGLOBIN: 30.7 PG (ref 29–33)
MEAN CORPUSCULAR HGB CONC: 31.4 G/DL (ref 32–37)
MEAN CORPUSCULAR VOLUME: 97.8 FL (ref 82–101)
MONOCYTE #: 0.5 10^3/UL (ref 0.3–0.9)
MONOCYTES %: 11.1 % (ref 0–11)
NEUTROPHIL #: 2 10^3/UL (ref 1.6–7.5)
NEUTROPHILS %: 46.9 % (ref 39–77)
NUCLEATED RED BLOOD CELLS #: 0 10^3/UL (ref 0–0)
NUCLEATED RED BLOOD CELLS%: 0 /100WBC (ref 0–0)
PHOSPHORUS: 6 MG/DL (ref 2.5–4.9)
PLATELET COUNT: 87 10^3/UL (ref 140–415)
POSITIVE DIFF: (no result)
POTASSIUM: 4.4 MMOL/L (ref 3.5–5.1)
RED BLOOD COUNT: 3.65 10^6/UL (ref 4.7–6.1)
RED CELL DISTRIBUTION WIDTH: 17.8 % (ref 11.5–14.5)
SODIUM: 133 MMOL/L (ref 135–144)
WHITE BLOOD COUNT: 4.2 10^3/UL (ref 4.8–10.8)

## 2018-01-05 RX ADMIN — MUPIROCIN 1 APPLIC: 20 OINTMENT TOPICAL at 22:21

## 2018-01-05 RX ADMIN — HEPARIN SODIUM 1 UNIT: 5000 INJECTION, SOLUTION INTRAVENOUS; SUBCUTANEOUS at 09:07

## 2018-01-05 RX ADMIN — COLISTIMETHATE SODIUM 1 MG: 150 INJECTION, POWDER, FOR SOLUTION INTRAMUSCULAR; INTRAVENOUS at 08:10

## 2018-01-05 RX ADMIN — SEVELAMER CARBONATE 1 GM: 2400 POWDER, FOR SUSPENSION ORAL at 11:25

## 2018-01-05 RX ADMIN — LEVETIRACETAM 1 MG: 500 TABLET ORAL at 21:42

## 2018-01-05 RX ADMIN — PROPOFOL 1 MLS/HR: 10 INJECTION, EMULSION INTRAVENOUS at 17:56

## 2018-01-05 RX ADMIN — SEVELAMER CARBONATE 1 GM: 2400 POWDER, FOR SUSPENSION ORAL at 17:55

## 2018-01-05 RX ADMIN — SEVELAMER CARBONATE 1 GM: 2400 POWDER, FOR SUSPENSION ORAL at 07:47

## 2018-01-05 RX ADMIN — FAMOTIDINE 1 MG: 20 TABLET ORAL at 09:00

## 2018-01-05 RX ADMIN — COLISTIMETHATE SODIUM 1 MG: 150 INJECTION, POWDER, FOR SOLUTION INTRAMUSCULAR; INTRAVENOUS at 18:57

## 2018-01-05 RX ADMIN — MUPIROCIN 1 APPLIC: 20 OINTMENT TOPICAL at 09:08

## 2018-01-05 RX ADMIN — INSULIN ASPART 1 UNIT: 100 INJECTION, SOLUTION INTRAVENOUS; SUBCUTANEOUS at 05:24

## 2018-01-05 RX ADMIN — MUPIROCIN 1 APPLIC: 20 OINTMENT TOPICAL at 21:00

## 2018-01-05 RX ADMIN — PROPOFOL 1 MLS/HR: 10 INJECTION, EMULSION INTRAVENOUS at 04:36

## 2018-01-05 RX ADMIN — INSULIN ASPART 1 UNIT: 100 INJECTION, SOLUTION INTRAVENOUS; SUBCUTANEOUS at 18:00

## 2018-01-05 RX ADMIN — INSULIN ASPART 1 UNIT: 100 INJECTION, SOLUTION INTRAVENOUS; SUBCUTANEOUS at 11:31

## 2018-01-05 RX ADMIN — HEPARIN SODIUM 1 UNIT: 5000 INJECTION, SOLUTION INTRAVENOUS; SUBCUTANEOUS at 22:52

## 2018-01-05 RX ADMIN — PROPOFOL 1 MLS/HR: 10 INJECTION, EMULSION INTRAVENOUS at 11:25

## 2018-01-05 RX ADMIN — PROPOFOL 1 MLS/HR: 10 INJECTION, EMULSION INTRAVENOUS at 22:54

## 2018-01-05 RX ADMIN — LEVETIRACETAM 1 MG: 500 TABLET ORAL at 09:00

## 2018-01-06 LAB
ABNORMAL IP MESSAGE: 1
ADD MAN DIFF?: NO
ANION GAP: 21 (ref 8–16)
BASOPHIL #: 0.1 10^3/UL (ref 0–0.1)
BASOPHILS %: 1.1 % (ref 0–2)
BLOOD UREA NITROGEN: 78 MG/DL (ref 7–20)
CALCIUM: 9.3 MG/DL (ref 8.4–10.2)
CARBON DIOXIDE: 24 MMOL/L (ref 21–31)
CHLORIDE: 90 MMOL/L (ref 97–110)
CREATININE: 4.58 MG/DL (ref 0.61–1.24)
EOSINOPHILS #: 1 10^3/UL (ref 0–0.5)
EOSINOPHILS %: 21.2 % (ref 0–7)
GLUCOSE: 81 MG/DL (ref 70–220)
HEMATOCRIT: 34.4 % (ref 42–52)
HEMOGLOBIN: 11 G/DL (ref 14–18)
INR: 0.91
LYMPHOCYTES #: 1 10^3/UL (ref 0.8–2.9)
LYMPHOCYTES %: 22.3 % (ref 15–51)
MEAN CORPUSCULAR HEMOGLOBIN: 30.6 PG (ref 29–33)
MEAN CORPUSCULAR HGB CONC: 32 G/DL (ref 32–37)
MEAN CORPUSCULAR VOLUME: 95.8 FL (ref 82–101)
MONOCYTE #: 0.5 10^3/UL (ref 0.3–0.9)
MONOCYTES %: 11.7 % (ref 0–11)
NEUTROPHIL #: 2 10^3/UL (ref 1.6–7.5)
NEUTROPHILS %: 43.3 % (ref 39–77)
NUCLEATED RED BLOOD CELLS #: 0 10^3/UL (ref 0–0)
NUCLEATED RED BLOOD CELLS%: 0 /100WBC (ref 0–0)
PARTIAL THROMBOPLASTIN TIME: 38.5 SEC (ref 25–35)
PLATELET COUNT: 92 10^3/UL (ref 140–415)
PLATELET COUNT: 96 10^3/UL (ref 140–415)
POSITIVE DIFF: (no result)
POTASSIUM: 4.9 MMOL/L (ref 3.5–5.1)
PROTIME: 12.3 SEC (ref 11.9–14.9)
PT RATIO: 1
RED BLOOD COUNT: 3.59 10^6/UL (ref 4.7–6.1)
RED CELL DISTRIBUTION WIDTH: 17.4 % (ref 11.5–14.5)
SODIUM: 130 MMOL/L (ref 135–144)
THROMBIN TIME: 27.9 SEC (ref 13.8–19.1)
WHITE BLOOD COUNT: 4.5 10^3/UL (ref 4.8–10.8)

## 2018-01-06 RX ADMIN — ALBUMIN (HUMAN) 1 MLS/HR: 0.25 INJECTION, SOLUTION INTRAVENOUS at 09:27

## 2018-01-06 RX ADMIN — HEPARIN SODIUM 1 UNIT: 5000 INJECTION, SOLUTION INTRAVENOUS; SUBCUTANEOUS at 20:57

## 2018-01-06 RX ADMIN — HYDRALAZINE HYDROCHLORIDE 1 MG: 20 INJECTION INTRAMUSCULAR; INTRAVENOUS at 16:13

## 2018-01-06 RX ADMIN — PROPOFOL 1 MLS/HR: 10 INJECTION, EMULSION INTRAVENOUS at 15:06

## 2018-01-06 RX ADMIN — INSULIN ASPART 1 UNIT: 100 INJECTION, SOLUTION INTRAVENOUS; SUBCUTANEOUS at 23:40

## 2018-01-06 RX ADMIN — SEVELAMER CARBONATE 1 GM: 2400 POWDER, FOR SUSPENSION ORAL at 11:21

## 2018-01-06 RX ADMIN — COLISTIMETHATE SODIUM 1 MG: 150 INJECTION, POWDER, FOR SOLUTION INTRAMUSCULAR; INTRAVENOUS at 10:20

## 2018-01-06 RX ADMIN — COLISTIMETHATE SODIUM 1 MG: 150 INJECTION, POWDER, FOR SOLUTION INTRAMUSCULAR; INTRAVENOUS at 19:19

## 2018-01-06 RX ADMIN — INSULIN ASPART 1 UNIT: 100 INJECTION, SOLUTION INTRAVENOUS; SUBCUTANEOUS at 00:00

## 2018-01-06 RX ADMIN — INSULIN ASPART 1 UNIT: 100 INJECTION, SOLUTION INTRAVENOUS; SUBCUTANEOUS at 17:26

## 2018-01-06 RX ADMIN — HEPARIN SODIUM 1 UNIT: 5000 INJECTION, SOLUTION INTRAVENOUS; SUBCUTANEOUS at 09:05

## 2018-01-06 RX ADMIN — LEVETIRACETAM 1 MG: 500 TABLET ORAL at 10:43

## 2018-01-06 RX ADMIN — SEVELAMER CARBONATE 1 GM: 2400 POWDER, FOR SUSPENSION ORAL at 07:35

## 2018-01-06 RX ADMIN — LEVETIRACETAM 1 MG: 500 TABLET ORAL at 20:54

## 2018-01-06 RX ADMIN — HEPARIN SODIUM 1 UNIT: 1000 INJECTION, SOLUTION INTRAVENOUS; SUBCUTANEOUS at 09:24

## 2018-01-06 RX ADMIN — SEVELAMER CARBONATE 1 GM: 2400 POWDER, FOR SUSPENSION ORAL at 17:24

## 2018-01-06 RX ADMIN — MUPIROCIN 1 APPLIC: 20 OINTMENT TOPICAL at 20:54

## 2018-01-06 RX ADMIN — PROPOFOL 1 MLS/HR: 10 INJECTION, EMULSION INTRAVENOUS at 04:58

## 2018-01-06 RX ADMIN — INSULIN ASPART 1 UNIT: 100 INJECTION, SOLUTION INTRAVENOUS; SUBCUTANEOUS at 11:38

## 2018-01-06 RX ADMIN — MUPIROCIN 1 APPLIC: 20 OINTMENT TOPICAL at 08:47

## 2018-01-06 RX ADMIN — PROPOFOL 1 MLS/HR: 10 INJECTION, EMULSION INTRAVENOUS at 08:51

## 2018-01-06 RX ADMIN — PROPOFOL 1 MLS/HR: 10 INJECTION, EMULSION INTRAVENOUS at 22:30

## 2018-01-06 RX ADMIN — LEVETIRACETAM 1 MG: 500 TABLET ORAL at 09:00

## 2018-01-07 RX ADMIN — INSULIN ASPART 1 UNIT: 100 INJECTION, SOLUTION INTRAVENOUS; SUBCUTANEOUS at 17:15

## 2018-01-07 RX ADMIN — INSULIN ASPART 1 UNIT: 100 INJECTION, SOLUTION INTRAVENOUS; SUBCUTANEOUS at 11:27

## 2018-01-07 RX ADMIN — COLISTIMETHATE SODIUM 1 MG: 150 INJECTION, POWDER, FOR SOLUTION INTRAMUSCULAR; INTRAVENOUS at 09:00

## 2018-01-07 RX ADMIN — LEVETIRACETAM 1 MG: 500 TABLET ORAL at 20:55

## 2018-01-07 RX ADMIN — SEVELAMER CARBONATE 1 GM: 2400 POWDER, FOR SUSPENSION ORAL at 17:15

## 2018-01-07 RX ADMIN — FAMOTIDINE 1 MG: 20 TABLET ORAL at 08:25

## 2018-01-07 RX ADMIN — PROPOFOL 1 MLS/HR: 10 INJECTION, EMULSION INTRAVENOUS at 20:05

## 2018-01-07 RX ADMIN — COLISTIMETHATE SODIUM 1 MG: 150 INJECTION, POWDER, FOR SOLUTION INTRAMUSCULAR; INTRAVENOUS at 19:15

## 2018-01-07 RX ADMIN — LEVETIRACETAM 1 MG: 500 TABLET ORAL at 08:25

## 2018-01-07 RX ADMIN — INSULIN ASPART 1 UNIT: 100 INJECTION, SOLUTION INTRAVENOUS; SUBCUTANEOUS at 05:33

## 2018-01-07 RX ADMIN — PROPOFOL 1 MLS/HR: 10 INJECTION, EMULSION INTRAVENOUS at 05:59

## 2018-01-07 RX ADMIN — SEVELAMER CARBONATE 1 GM: 2400 POWDER, FOR SUSPENSION ORAL at 08:25

## 2018-01-07 RX ADMIN — HEPARIN SODIUM 1 UNIT: 5000 INJECTION, SOLUTION INTRAVENOUS; SUBCUTANEOUS at 20:57

## 2018-01-07 RX ADMIN — MUPIROCIN 1 APPLIC: 20 OINTMENT TOPICAL at 20:55

## 2018-01-07 RX ADMIN — MUPIROCIN 1 APPLIC: 20 OINTMENT TOPICAL at 08:25

## 2018-01-07 RX ADMIN — PROPOFOL 1 MLS/HR: 10 INJECTION, EMULSION INTRAVENOUS at 12:31

## 2018-01-07 RX ADMIN — SEVELAMER CARBONATE 1 GM: 2400 POWDER, FOR SUSPENSION ORAL at 11:24

## 2018-01-07 RX ADMIN — HEPARIN SODIUM 1 UNIT: 5000 INJECTION, SOLUTION INTRAVENOUS; SUBCUTANEOUS at 08:33

## 2018-01-08 LAB
ABNORMAL IP MESSAGE: 1
ADD MAN DIFF?: NO
ANION GAP: 23 (ref 8–16)
ARTERIAL BASE EXCESS: -1.4 MMOL/L (ref -3–3)
ARTERIAL BLOOD GAS OXYGEN SAT: 98.8 MMHG (ref 95–98)
ARTERIAL COHB: 0.4 % (ref 0–3)
ARTERIAL FRACTION OF OXYHGB: 98.1 % (ref 93–99)
ARTERIAL HCO3: 22.4 MMOL/L (ref 22–26)
ARTERIAL METHB: 0.3 % (ref 0–1.5)
ARTERIAL PCO2: 34.6 MMHG (ref 35–45)
ARTERIAL TOTAL HEMGLOBIN: 12 G/DL (ref 12–18)
BASOPHIL #: 0.1 10^3/UL (ref 0–0.1)
BASOPHILS %: 1.3 % (ref 0–2)
BLOOD GAS LOW PEEP SETTING: 5 CMH2O
BLOOD GAS TIDAL VOLUME: 500 ML
BLOOD UREA NITROGEN: 91 MG/DL (ref 7–20)
CALCIUM: 9.6 MG/DL (ref 8.4–10.2)
CARBON DIOXIDE: 22 MMOL/L (ref 21–31)
CHLORIDE: 85 MMOL/L (ref 97–110)
CREATININE: 4.21 MG/DL (ref 0.61–1.24)
EOSINOPHILS #: 0.8 10^3/UL (ref 0–0.5)
EOSINOPHILS %: 21.8 % (ref 0–7)
FIO2: 30 %
GLUCOSE: 86 MG/DL (ref 70–220)
HEMATOCRIT: 34.9 % (ref 42–52)
HEMOGLOBIN: 11.2 G/DL (ref 14–18)
LYMPHOCYTES #: 0.9 10^3/UL (ref 0.8–2.9)
LYMPHOCYTES %: 24.5 % (ref 15–51)
MEAN CORPUSCULAR HEMOGLOBIN: 30.6 PG (ref 29–33)
MEAN CORPUSCULAR HGB CONC: 32.1 G/DL (ref 32–37)
MEAN CORPUSCULAR VOLUME: 95.4 FL (ref 82–101)
MODE: (no result)
MONOCYTE #: 0.5 10^3/UL (ref 0.3–0.9)
MONOCYTES %: 12.6 % (ref 0–11)
NEUTROPHIL #: 1.5 10^3/UL (ref 1.6–7.5)
NEUTROPHILS %: 39.5 % (ref 39–77)
NUCLEATED RED BLOOD CELLS #: 0 10^3/UL (ref 0–0)
NUCLEATED RED BLOOD CELLS%: 0 /100WBC (ref 0–0)
O2 A-A PPRESDIFF RESPIRATORY: 34.2 MMHG (ref 7–24)
PLATELET COUNT: 81 10^3/UL (ref 140–415)
POSITIVE DIFF: (no result)
POTASSIUM: 4.9 MMOL/L (ref 3.5–5.1)
RED BLOOD COUNT: 3.66 10^6/UL (ref 4.7–6.1)
RED CELL DISTRIBUTION WIDTH: 17.1 % (ref 11.5–14.5)
SODIUM: 125 MMOL/L (ref 135–144)
WHITE BLOOD COUNT: 3.7 10^3/UL (ref 4.8–10.8)

## 2018-01-08 PROCEDURE — 5A1955Z RESPIRATORY VENTILATION, GREATER THAN 96 CONSECUTIVE HOURS: ICD-10-PCS

## 2018-01-08 PROCEDURE — 5A1D70Z PERFORMANCE OF URINARY FILTRATION, INTERMITTENT, LESS THAN 6 HOURS PER DAY: ICD-10-PCS

## 2018-01-08 PROCEDURE — 0BH17EZ INSERTION OF ENDOTRACHEAL AIRWAY INTO TRACHEA, VIA NATURAL OR ARTIFICIAL OPENING: ICD-10-PCS

## 2018-01-08 PROCEDURE — 0B110F4 BYPASS TRACHEA TO CUTANEOUS WITH TRACHEOSTOMY DEVICE, OPEN APPROACH: ICD-10-PCS

## 2018-01-08 RX ADMIN — INSULIN ASPART 1 UNIT: 100 INJECTION, SOLUTION INTRAVENOUS; SUBCUTANEOUS at 00:00

## 2018-01-08 RX ADMIN — INSULIN ASPART 1 UNIT: 100 INJECTION, SOLUTION INTRAVENOUS; SUBCUTANEOUS at 05:45

## 2018-01-08 RX ADMIN — INSULIN ASPART 1 UNIT: 100 INJECTION, SOLUTION INTRAVENOUS; SUBCUTANEOUS at 21:30

## 2018-01-08 RX ADMIN — SEVELAMER CARBONATE 1 GM: 2400 POWDER, FOR SUSPENSION ORAL at 09:30

## 2018-01-08 RX ADMIN — COLISTIMETHATE SODIUM 1 MG: 150 INJECTION, POWDER, FOR SOLUTION INTRAMUSCULAR; INTRAVENOUS at 08:13

## 2018-01-08 RX ADMIN — MUPIROCIN 1 APPLIC: 20 OINTMENT TOPICAL at 21:31

## 2018-01-08 RX ADMIN — LEVETIRACETAM 1 MG: 500 TABLET ORAL at 21:31

## 2018-01-08 RX ADMIN — ALBUMIN (HUMAN) 1 MLS/HR: 0.25 INJECTION, SOLUTION INTRAVENOUS at 14:20

## 2018-01-08 RX ADMIN — LEVETIRACETAM 1 MG: 500 TABLET ORAL at 09:30

## 2018-01-08 RX ADMIN — PROPOFOL 1 MLS/HR: 10 INJECTION, EMULSION INTRAVENOUS at 11:33

## 2018-01-08 RX ADMIN — MUPIROCIN 1 APPLIC: 20 OINTMENT TOPICAL at 09:30

## 2018-01-08 RX ADMIN — HEPARIN SODIUM 1 UNIT: 5000 INJECTION, SOLUTION INTRAVENOUS; SUBCUTANEOUS at 09:00

## 2018-01-08 RX ADMIN — MUPIROCIN 1 APPLIC: 20 OINTMENT TOPICAL at 21:32

## 2018-01-08 RX ADMIN — HEPARIN SODIUM 1 UNIT: 5000 INJECTION, SOLUTION INTRAVENOUS; SUBCUTANEOUS at 21:00

## 2018-01-08 RX ADMIN — LIDOCAINE HYDROCHLORIDE 1 ML: 10 INJECTION, SOLUTION INFILTRATION; PERINEURAL at 20:20

## 2018-01-08 RX ADMIN — PROPOFOL 1 MLS/HR: 10 INJECTION, EMULSION INTRAVENOUS at 03:51

## 2018-01-08 RX ADMIN — SEVELAMER CARBONATE 1 GM: 2400 POWDER, FOR SUSPENSION ORAL at 21:31

## 2018-01-08 RX ADMIN — SEVELAMER CARBONATE 1 GM: 2400 POWDER, FOR SUSPENSION ORAL at 11:30

## 2018-01-08 RX ADMIN — INSULIN ASPART 1 UNIT: 100 INJECTION, SOLUTION INTRAVENOUS; SUBCUTANEOUS at 12:00

## 2018-01-09 LAB
ABNORMAL IP MESSAGE: 1
ADD MAN DIFF?: NO
ANION GAP: 22 (ref 8–16)
BASOPHIL #: 0.1 10^3/UL (ref 0–0.1)
BASOPHILS %: 1.3 % (ref 0–2)
BLOOD UREA NITROGEN: 71 MG/DL (ref 7–20)
CALCIUM: 9.2 MG/DL (ref 8.4–10.2)
CARBON DIOXIDE: 23 MMOL/L (ref 21–31)
CHLORIDE: 88 MMOL/L (ref 97–110)
CREATININE: 3.79 MG/DL (ref 0.61–1.24)
EOSINOPHILS #: 0.7 10^3/UL (ref 0–0.5)
EOSINOPHILS %: 18.6 % (ref 0–7)
GLUCOSE: 87 MG/DL (ref 70–220)
HEMATOCRIT: 33.6 % (ref 42–52)
HEMOGLOBIN: 10.9 G/DL (ref 14–18)
LYMPHOCYTES #: 0.7 10^3/UL (ref 0.8–2.9)
LYMPHOCYTES %: 17.1 % (ref 15–51)
MEAN CORPUSCULAR HEMOGLOBIN: 31.1 PG (ref 29–33)
MEAN CORPUSCULAR HGB CONC: 32.4 G/DL (ref 32–37)
MEAN CORPUSCULAR VOLUME: 96 FL (ref 82–101)
MONOCYTE #: 0.5 10^3/UL (ref 0.3–0.9)
MONOCYTES %: 11.8 % (ref 0–11)
NEUTROPHIL #: 1.9 10^3/UL (ref 1.6–7.5)
NEUTROPHILS %: 50.9 % (ref 39–77)
NUCLEATED RED BLOOD CELLS #: 0 10^3/UL (ref 0–0)
NUCLEATED RED BLOOD CELLS%: 0 /100WBC (ref 0–0)
PLATELET COUNT: 71 10^3/UL (ref 140–415)
POSITIVE DIFF: (no result)
POTASSIUM: 4.9 MMOL/L (ref 3.5–5.1)
RED BLOOD COUNT: 3.5 10^6/UL (ref 4.7–6.1)
RED CELL DISTRIBUTION WIDTH: 17 % (ref 11.5–14.5)
SODIUM: 128 MMOL/L (ref 135–144)
WHITE BLOOD COUNT: 3.8 10^3/UL (ref 4.8–10.8)

## 2018-01-09 RX ADMIN — MUPIROCIN 1 APPLIC: 20 OINTMENT TOPICAL at 20:54

## 2018-01-09 RX ADMIN — SEVELAMER CARBONATE 1 GM: 2400 POWDER, FOR SUSPENSION ORAL at 20:54

## 2018-01-09 RX ADMIN — PROPOFOL 1 MLS/HR: 10 INJECTION, EMULSION INTRAVENOUS at 02:36

## 2018-01-09 RX ADMIN — INSULIN ASPART 1 UNIT: 100 INJECTION, SOLUTION INTRAVENOUS; SUBCUTANEOUS at 05:30

## 2018-01-09 RX ADMIN — INSULIN ASPART 1 UNIT: 100 INJECTION, SOLUTION INTRAVENOUS; SUBCUTANEOUS at 11:42

## 2018-01-09 RX ADMIN — SEVELAMER CARBONATE 1 GM: 2400 POWDER, FOR SUSPENSION ORAL at 11:43

## 2018-01-09 RX ADMIN — INSULIN ASPART 1 UNIT: 100 INJECTION, SOLUTION INTRAVENOUS; SUBCUTANEOUS at 21:30

## 2018-01-09 RX ADMIN — MUPIROCIN 1 APPLIC: 20 OINTMENT TOPICAL at 09:00

## 2018-01-09 RX ADMIN — MUPIROCIN 1 APPLIC: 20 OINTMENT TOPICAL at 09:06

## 2018-01-09 RX ADMIN — FAMOTIDINE 1 MG: 20 TABLET ORAL at 09:06

## 2018-01-09 RX ADMIN — MUPIROCIN 1 APPLIC: 20 OINTMENT TOPICAL at 19:43

## 2018-01-09 RX ADMIN — INSULIN ASPART 1 UNIT: 100 INJECTION, SOLUTION INTRAVENOUS; SUBCUTANEOUS at 00:00

## 2018-01-09 RX ADMIN — PROPOFOL 1 MLS/HR: 10 INJECTION, EMULSION INTRAVENOUS at 17:35

## 2018-01-09 RX ADMIN — LEVETIRACETAM 1 MG: 500 TABLET ORAL at 09:06

## 2018-01-09 RX ADMIN — SEVELAMER CARBONATE 1 GM: 2400 POWDER, FOR SUSPENSION ORAL at 08:24

## 2018-01-09 RX ADMIN — LEVETIRACETAM 1 MG: 500 TABLET ORAL at 20:54

## 2018-01-10 LAB
ABNORMAL IP MESSAGE: 1
ADD MAN DIFF?: NO
ANION GAP: 21 (ref 8–16)
BASOPHIL #: 0 10^3/UL (ref 0–0.1)
BASOPHILS %: 0.9 % (ref 0–2)
BLOOD UREA NITROGEN: 84 MG/DL (ref 7–20)
CALCIUM: 9.4 MG/DL (ref 8.4–10.2)
CARBON DIOXIDE: 22 MMOL/L (ref 21–31)
CHLORIDE: 87 MMOL/L (ref 97–110)
CREATININE: 4.22 MG/DL (ref 0.61–1.24)
EOSINOPHILS #: 0.6 10^3/UL (ref 0–0.5)
EOSINOPHILS %: 14.2 % (ref 0–7)
GLUCOSE: 103 MG/DL (ref 70–220)
HEMATOCRIT: 31.4 % (ref 42–52)
HEMOGLOBIN: 10.4 G/DL (ref 14–18)
LYMPHOCYTES #: 0.8 10^3/UL (ref 0.8–2.9)
LYMPHOCYTES %: 18.7 % (ref 15–51)
MEAN CORPUSCULAR HEMOGLOBIN: 31 PG (ref 29–33)
MEAN CORPUSCULAR HGB CONC: 33.1 G/DL (ref 32–37)
MEAN CORPUSCULAR VOLUME: 93.7 FL (ref 82–101)
MEAN PLATELET VOLUME: 14.2 FL (ref 7.4–10.4)
MONOCYTE #: 0.4 10^3/UL (ref 0.3–0.9)
MONOCYTES %: 9.8 % (ref 0–11)
NEUTROPHIL #: 2.5 10^3/UL (ref 1.6–7.5)
NEUTROPHILS %: 56.2 % (ref 39–77)
NUCLEATED RED BLOOD CELLS #: 0 10^3/UL (ref 0–0)
NUCLEATED RED BLOOD CELLS%: 0 /100WBC (ref 0–0)
PLATELET COUNT: 100 10^3/UL (ref 140–415)
POSITIVE DIFF: (no result)
POTASSIUM: 5 MMOL/L (ref 3.5–5.1)
RED BLOOD COUNT: 3.35 10^6/UL (ref 4.7–6.1)
RED CELL DISTRIBUTION WIDTH: 16.5 % (ref 11.5–14.5)
SODIUM: 125 MMOL/L (ref 135–144)
WHITE BLOOD COUNT: 4.5 10^3/UL (ref 4.8–10.8)

## 2018-01-10 RX ADMIN — SEVELAMER CARBONATE 1 GM: 2400 POWDER, FOR SUSPENSION ORAL at 09:14

## 2018-01-10 RX ADMIN — HYDRALAZINE HYDROCHLORIDE 1 MG: 20 INJECTION INTRAMUSCULAR; INTRAVENOUS at 04:12

## 2018-01-10 RX ADMIN — MUPIROCIN 1 APPLIC: 20 OINTMENT TOPICAL at 09:00

## 2018-01-10 RX ADMIN — LEVETIRACETAM 1 MG: 500 TABLET ORAL at 09:14

## 2018-01-10 RX ADMIN — SEVELAMER CARBONATE 1 GM: 2400 POWDER, FOR SUSPENSION ORAL at 17:55

## 2018-01-10 RX ADMIN — PROPOFOL 1 MLS/HR: 10 INJECTION, EMULSION INTRAVENOUS at 00:42

## 2018-01-10 RX ADMIN — INSULIN ASPART 1 UNIT: 100 INJECTION, SOLUTION INTRAVENOUS; SUBCUTANEOUS at 14:58

## 2018-01-10 RX ADMIN — INSULIN ASPART 1 UNIT: 100 INJECTION, SOLUTION INTRAVENOUS; SUBCUTANEOUS at 06:00

## 2018-01-10 RX ADMIN — MUPIROCIN 1 APPLIC: 20 OINTMENT TOPICAL at 09:21

## 2018-01-10 RX ADMIN — MUPIROCIN 1 APPLIC: 20 OINTMENT TOPICAL at 09:14

## 2018-01-10 RX ADMIN — INSULIN ASPART 1 UNIT: 100 INJECTION, SOLUTION INTRAVENOUS; SUBCUTANEOUS at 22:00

## 2018-01-10 RX ADMIN — SEVELAMER CARBONATE 1 GM: 2400 POWDER, FOR SUSPENSION ORAL at 11:21

## 2018-01-10 RX ADMIN — MUPIROCIN 1 APPLIC: 20 OINTMENT TOPICAL at 21:00

## 2018-01-10 RX ADMIN — LEVETIRACETAM 1 MG: 500 TABLET ORAL at 21:42

## 2018-01-11 LAB
ABNORMAL IP MESSAGE: 1
ADD MAN DIFF?: NO
ANION GAP: 18 (ref 8–16)
BASOPHIL #: 0.1 10^3/UL (ref 0–0.1)
BASOPHILS %: 1.1 % (ref 0–2)
BLOOD UREA NITROGEN: 79 MG/DL (ref 7–20)
CALCIUM: 9.7 MG/DL (ref 8.4–10.2)
CARBON DIOXIDE: 21 MMOL/L (ref 21–31)
CHLORIDE: 93 MMOL/L (ref 97–110)
CREATININE: 4.2 MG/DL (ref 0.61–1.24)
EOSINOPHILS #: 0.6 10^3/UL (ref 0–0.5)
EOSINOPHILS %: 14.2 % (ref 0–7)
GLUCOSE: 74 MG/DL (ref 70–220)
HEMATOCRIT: 33 % (ref 42–52)
HEMOGLOBIN: 10.2 G/DL (ref 14–18)
LYMPHOCYTES #: 0.7 10^3/UL (ref 0.8–2.9)
LYMPHOCYTES %: 15.7 % (ref 15–51)
MEAN CORPUSCULAR HEMOGLOBIN: 31.2 PG (ref 29–33)
MEAN CORPUSCULAR HGB CONC: 30.9 G/DL (ref 32–37)
MEAN CORPUSCULAR VOLUME: 100.9 FL (ref 82–101)
MONOCYTE #: 0.6 10^3/UL (ref 0.3–0.9)
MONOCYTES %: 12.4 % (ref 0–11)
NEUTROPHIL #: 2.5 10^3/UL (ref 1.6–7.5)
NEUTROPHILS %: 56.2 % (ref 39–77)
NUCLEATED RED BLOOD CELLS #: 0 10^3/UL (ref 0–0)
NUCLEATED RED BLOOD CELLS%: 0 /100WBC (ref 0–0)
PLATELET COUNT: 77 10^3/UL (ref 140–415)
POSITIVE DIFF: (no result)
POTASSIUM: 4.4 MMOL/L (ref 3.5–5.1)
RED BLOOD COUNT: 3.27 10^6/UL (ref 4.7–6.1)
RED CELL DISTRIBUTION WIDTH: 16.9 % (ref 11.5–14.5)
SODIUM: 128 MMOL/L (ref 135–144)
WHITE BLOOD COUNT: 4.5 10^3/UL (ref 4.8–10.8)

## 2018-01-11 RX ADMIN — SEVELAMER CARBONATE 1 GM: 2400 POWDER, FOR SUSPENSION ORAL at 18:08

## 2018-01-11 RX ADMIN — INSULIN ASPART 1 UNIT: 100 INJECTION, SOLUTION INTRAVENOUS; SUBCUTANEOUS at 21:14

## 2018-01-11 RX ADMIN — FAMOTIDINE 1 MG: 20 TABLET ORAL at 09:26

## 2018-01-11 RX ADMIN — HEPARIN SODIUM 1 UNIT: 5000 INJECTION, SOLUTION INTRAVENOUS; SUBCUTANEOUS at 21:00

## 2018-01-11 RX ADMIN — MUPIROCIN 1 APPLIC: 20 OINTMENT TOPICAL at 09:27

## 2018-01-11 RX ADMIN — LEVETIRACETAM 1 MG: 500 TABLET ORAL at 09:26

## 2018-01-11 RX ADMIN — INSULIN ASPART 1 UNIT: 100 INJECTION, SOLUTION INTRAVENOUS; SUBCUTANEOUS at 05:44

## 2018-01-11 RX ADMIN — HEPARIN SODIUM 1 UNIT: 5000 INJECTION, SOLUTION INTRAVENOUS; SUBCUTANEOUS at 09:00

## 2018-01-11 RX ADMIN — LEVETIRACETAM 1 MG: 500 TABLET ORAL at 21:12

## 2018-01-11 RX ADMIN — INSULIN ASPART 1 UNIT: 100 INJECTION, SOLUTION INTRAVENOUS; SUBCUTANEOUS at 13:24

## 2018-01-11 RX ADMIN — SEVELAMER CARBONATE 1 GM: 2400 POWDER, FOR SUSPENSION ORAL at 13:21

## 2018-01-11 RX ADMIN — MUPIROCIN 1 APPLIC: 20 OINTMENT TOPICAL at 21:15

## 2018-01-11 RX ADMIN — SEVELAMER CARBONATE 1 GM: 2400 POWDER, FOR SUSPENSION ORAL at 09:27

## 2018-01-12 RX ADMIN — MUPIROCIN 1 APPLIC: 20 OINTMENT TOPICAL at 21:14

## 2018-01-12 RX ADMIN — HEPARIN SODIUM 1 UNIT: 5000 INJECTION, SOLUTION INTRAVENOUS; SUBCUTANEOUS at 21:00

## 2018-01-12 RX ADMIN — LEVETIRACETAM 1 MG: 500 TABLET ORAL at 09:48

## 2018-01-12 RX ADMIN — HEPARIN SODIUM 1 UNIT: 5000 INJECTION, SOLUTION INTRAVENOUS; SUBCUTANEOUS at 09:00

## 2018-01-12 RX ADMIN — SEVELAMER CARBONATE 1 GM: 2400 POWDER, FOR SUSPENSION ORAL at 12:50

## 2018-01-12 RX ADMIN — SEVELAMER CARBONATE 1 GM: 2400 POWDER, FOR SUSPENSION ORAL at 17:18

## 2018-01-12 RX ADMIN — SEVELAMER CARBONATE 1 GM: 2400 POWDER, FOR SUSPENSION ORAL at 09:48

## 2018-01-12 RX ADMIN — HYDRALAZINE HYDROCHLORIDE 1 MG: 20 INJECTION INTRAMUSCULAR; INTRAVENOUS at 17:18

## 2018-01-12 RX ADMIN — LEVETIRACETAM 1 MG: 500 TABLET ORAL at 21:14

## 2018-01-12 RX ADMIN — MUPIROCIN 1 APPLIC: 20 OINTMENT TOPICAL at 09:50

## 2018-01-12 RX ADMIN — INSULIN ASPART 1 UNIT: 100 INJECTION, SOLUTION INTRAVENOUS; SUBCUTANEOUS at 05:18

## 2018-01-12 RX ADMIN — INSULIN ASPART 1 UNIT: 100 INJECTION, SOLUTION INTRAVENOUS; SUBCUTANEOUS at 21:14

## 2018-01-12 RX ADMIN — INSULIN ASPART 1 UNIT: 100 INJECTION, SOLUTION INTRAVENOUS; SUBCUTANEOUS at 14:00

## 2018-01-13 LAB
ABNORMAL IP MESSAGE: 1
ADD MAN DIFF?: NO
ANION GAP: 19 (ref 8–16)
BASOPHIL #: 0.1 10^3/UL (ref 0–0.1)
BASOPHILS %: 1.1 % (ref 0–2)
BLOOD UREA NITROGEN: 66 MG/DL (ref 7–20)
CALCIUM: 9.4 MG/DL (ref 8.4–10.2)
CARBON DIOXIDE: 27 MMOL/L (ref 21–31)
CHLORIDE: 90 MMOL/L (ref 97–110)
CREATININE: 4.36 MG/DL (ref 0.61–1.24)
EOSINOPHILS #: 0.7 10^3/UL (ref 0–0.5)
EOSINOPHILS %: 16.4 % (ref 0–7)
GLUCOSE: 82 MG/DL (ref 70–220)
HEMATOCRIT: 31.4 % (ref 42–52)
HEMOGLOBIN: 10.1 G/DL (ref 14–18)
LYMPHOCYTES #: 0.8 10^3/UL (ref 0.8–2.9)
LYMPHOCYTES %: 18 % (ref 15–51)
MEAN CORPUSCULAR HEMOGLOBIN: 31 PG (ref 29–33)
MEAN CORPUSCULAR HGB CONC: 32.2 G/DL (ref 32–37)
MEAN CORPUSCULAR VOLUME: 96.3 FL (ref 82–101)
MEAN PLATELET VOLUME: 13.4 FL (ref 7.4–10.4)
MONOCYTE #: 0.7 10^3/UL (ref 0.3–0.9)
MONOCYTES %: 15.5 % (ref 0–11)
NEUTROPHIL #: 2.2 10^3/UL (ref 1.6–7.5)
NEUTROPHILS %: 48.8 % (ref 39–77)
NUCLEATED RED BLOOD CELLS #: 0 10^3/UL (ref 0–0)
NUCLEATED RED BLOOD CELLS%: 0 /100WBC (ref 0–0)
PLATELET COUNT: 120 10^3/UL (ref 140–415)
POSITIVE DIFF: (no result)
POTASSIUM: 4 MMOL/L (ref 3.5–5.1)
RED BLOOD COUNT: 3.26 10^6/UL (ref 4.7–6.1)
RED CELL DISTRIBUTION WIDTH: 16.3 % (ref 11.5–14.5)
SODIUM: 132 MMOL/L (ref 135–144)
WHITE BLOOD COUNT: 4.5 10^3/UL (ref 4.8–10.8)

## 2018-01-13 RX ADMIN — HYDRALAZINE HYDROCHLORIDE 1 MG: 20 INJECTION INTRAMUSCULAR; INTRAVENOUS at 14:00

## 2018-01-13 RX ADMIN — FAMOTIDINE 1 MG: 20 TABLET ORAL at 08:08

## 2018-01-13 RX ADMIN — INSULIN ASPART 1 UNIT: 100 INJECTION, SOLUTION INTRAVENOUS; SUBCUTANEOUS at 21:29

## 2018-01-13 RX ADMIN — MUPIROCIN 1 APPLIC: 20 OINTMENT TOPICAL at 21:27

## 2018-01-13 RX ADMIN — SEVELAMER CARBONATE 1 GM: 2400 POWDER, FOR SUSPENSION ORAL at 08:09

## 2018-01-13 RX ADMIN — INSULIN ASPART 1 UNIT: 100 INJECTION, SOLUTION INTRAVENOUS; SUBCUTANEOUS at 13:15

## 2018-01-13 RX ADMIN — MUPIROCIN 1 APPLIC: 20 OINTMENT TOPICAL at 21:26

## 2018-01-13 RX ADMIN — HEPARIN SODIUM 1 UNIT: 5000 INJECTION, SOLUTION INTRAVENOUS; SUBCUTANEOUS at 08:08

## 2018-01-13 RX ADMIN — SEVELAMER CARBONATE 1 GM: 2400 POWDER, FOR SUSPENSION ORAL at 11:16

## 2018-01-13 RX ADMIN — LEVETIRACETAM 1 MG: 500 TABLET ORAL at 21:25

## 2018-01-13 RX ADMIN — LEVETIRACETAM 1 MG: 500 TABLET ORAL at 08:09

## 2018-01-13 RX ADMIN — INSULIN ASPART 1 UNIT: 100 INJECTION, SOLUTION INTRAVENOUS; SUBCUTANEOUS at 06:00

## 2018-01-13 RX ADMIN — MUPIROCIN 1 APPLIC: 20 OINTMENT TOPICAL at 08:09

## 2018-01-13 RX ADMIN — SEVELAMER CARBONATE 1 GM: 2400 POWDER, FOR SUSPENSION ORAL at 16:27

## 2018-01-14 RX ADMIN — INSULIN ASPART 1 UNIT: 100 INJECTION, SOLUTION INTRAVENOUS; SUBCUTANEOUS at 05:51

## 2018-01-14 RX ADMIN — SEVELAMER CARBONATE 1 GM: 2400 POWDER, FOR SUSPENSION ORAL at 12:22

## 2018-01-14 RX ADMIN — SEVELAMER CARBONATE 1 GM: 2400 POWDER, FOR SUSPENSION ORAL at 16:49

## 2018-01-14 RX ADMIN — SEVELAMER CARBONATE 1 GM: 2400 POWDER, FOR SUSPENSION ORAL at 08:34

## 2018-01-14 RX ADMIN — LEVETIRACETAM 1 MG: 500 TABLET ORAL at 08:34

## 2018-01-14 RX ADMIN — INSULIN ASPART 1 UNIT: 100 INJECTION, SOLUTION INTRAVENOUS; SUBCUTANEOUS at 13:39

## 2018-01-14 RX ADMIN — HYDRALAZINE HYDROCHLORIDE 1 MG: 20 INJECTION INTRAMUSCULAR; INTRAVENOUS at 12:38

## 2018-01-14 RX ADMIN — INSULIN ASPART 1 UNIT: 100 INJECTION, SOLUTION INTRAVENOUS; SUBCUTANEOUS at 22:00

## 2018-01-14 RX ADMIN — MUPIROCIN 1 APPLIC: 20 OINTMENT TOPICAL at 08:34

## 2018-01-14 RX ADMIN — LEVETIRACETAM 1 MG: 500 TABLET ORAL at 21:26

## 2018-01-14 RX ADMIN — MUPIROCIN 1 APPLIC: 20 OINTMENT TOPICAL at 21:28

## 2018-01-15 LAB
ADD MAN DIFF?: NO
ANION GAP: 17 (ref 8–16)
BASOPHIL #: 0.1 10^3/UL (ref 0–0.1)
BASOPHILS %: 1.3 % (ref 0–2)
BLOOD UREA NITROGEN: 104 MG/DL (ref 7–20)
CALCIUM: 10.1 MG/DL (ref 8.4–10.2)
CARBON DIOXIDE: 26 MMOL/L (ref 21–31)
CHLORIDE: 88 MMOL/L (ref 97–110)
CREATININE: 6.28 MG/DL (ref 0.61–1.24)
EOSINOPHILS #: 0.9 10^3/UL (ref 0–0.5)
EOSINOPHILS %: 20.3 % (ref 0–7)
GLUCOSE: 97 MG/DL (ref 70–220)
HEMATOCRIT: 29.5 % (ref 42–52)
HEMOGLOBIN: 9.7 G/DL (ref 14–18)
HEPATITIS B SURFACE ANTIBODY: NEGATIVE
HEPATITIS B SURFACE ANTIGEN: NEGATIVE
LYMPHOCYTES #: 0.9 10^3/UL (ref 0.8–2.9)
LYMPHOCYTES %: 19.6 % (ref 15–51)
MEAN CORPUSCULAR HEMOGLOBIN: 31.2 PG (ref 29–33)
MEAN CORPUSCULAR HGB CONC: 32.9 G/DL (ref 32–37)
MEAN CORPUSCULAR VOLUME: 94.9 FL (ref 82–101)
MEAN PLATELET VOLUME: 12.3 FL (ref 7.4–10.4)
MONOCYTE #: 0.6 10^3/UL (ref 0.3–0.9)
MONOCYTES %: 13.1 % (ref 0–11)
NEUTROPHIL #: 2 10^3/UL (ref 1.6–7.5)
NEUTROPHILS %: 45.5 % (ref 39–77)
NUCLEATED RED BLOOD CELLS #: 0 10^3/UL (ref 0–0)
NUCLEATED RED BLOOD CELLS%: 0 /100WBC (ref 0–0)
PLATELET COUNT: 140 10^3/UL (ref 140–415)
POTASSIUM: 4.6 MMOL/L (ref 3.5–5.1)
RED BLOOD COUNT: 3.11 10^6/UL (ref 4.7–6.1)
RED CELL DISTRIBUTION WIDTH: 16.2 % (ref 11.5–14.5)
SODIUM: 126 MMOL/L (ref 135–144)
WHITE BLOOD COUNT: 4.5 10^3/UL (ref 4.8–10.8)

## 2018-01-15 RX ADMIN — MUPIROCIN 1 APPLIC: 20 OINTMENT TOPICAL at 21:49

## 2018-01-15 RX ADMIN — INSULIN ASPART 1 UNIT: 100 INJECTION, SOLUTION INTRAVENOUS; SUBCUTANEOUS at 14:00

## 2018-01-15 RX ADMIN — LEVETIRACETAM 1 MG: 500 TABLET ORAL at 21:49

## 2018-01-15 RX ADMIN — INSULIN ASPART 1 UNIT: 100 INJECTION, SOLUTION INTRAVENOUS; SUBCUTANEOUS at 21:50

## 2018-01-15 RX ADMIN — LEVETIRACETAM 1 MG: 500 TABLET ORAL at 09:40

## 2018-01-15 RX ADMIN — SEVELAMER CARBONATE 1 GM: 2400 POWDER, FOR SUSPENSION ORAL at 17:21

## 2018-01-15 RX ADMIN — MUPIROCIN 1 APPLIC: 20 OINTMENT TOPICAL at 09:41

## 2018-01-15 RX ADMIN — FAMOTIDINE 1 MG: 20 TABLET ORAL at 09:40

## 2018-01-15 RX ADMIN — SEVELAMER CARBONATE 1 GM: 2400 POWDER, FOR SUSPENSION ORAL at 11:50

## 2018-01-15 RX ADMIN — MUPIROCIN 1 APPLIC: 20 OINTMENT TOPICAL at 21:00

## 2018-01-15 RX ADMIN — SEVELAMER CARBONATE 1 GM: 2400 POWDER, FOR SUSPENSION ORAL at 09:40

## 2018-01-15 RX ADMIN — INSULIN ASPART 1 UNIT: 100 INJECTION, SOLUTION INTRAVENOUS; SUBCUTANEOUS at 06:00

## 2018-01-16 LAB
ANION GAP: 21 (ref 8–16)
BLOOD UREA NITROGEN: 80 MG/DL (ref 7–20)
CALCIUM: 10 MG/DL (ref 8.4–10.2)
CARBON DIOXIDE: 24 MMOL/L (ref 21–31)
CHLORIDE: 93 MMOL/L (ref 97–110)
CREATININE: 5.55 MG/DL (ref 0.61–1.24)
GLUCOSE: 96 MG/DL (ref 70–220)
POTASSIUM: 4.9 MMOL/L (ref 3.5–5.1)
SODIUM: 133 MMOL/L (ref 135–144)

## 2018-01-16 RX ADMIN — INSULIN ASPART 1 UNIT: 100 INJECTION, SOLUTION INTRAVENOUS; SUBCUTANEOUS at 06:00

## 2018-01-16 RX ADMIN — LEVETIRACETAM 1 MG: 500 TABLET ORAL at 08:34

## 2018-01-16 RX ADMIN — MUPIROCIN 1 APPLIC: 20 OINTMENT TOPICAL at 08:35

## 2018-01-16 RX ADMIN — SEVELAMER CARBONATE 1 GM: 2400 POWDER, FOR SUSPENSION ORAL at 11:47

## 2018-01-16 RX ADMIN — SEVELAMER CARBONATE 1 GM: 2400 POWDER, FOR SUSPENSION ORAL at 17:48

## 2018-01-16 RX ADMIN — INSULIN ASPART 1 UNIT: 100 INJECTION, SOLUTION INTRAVENOUS; SUBCUTANEOUS at 13:49

## 2018-01-16 RX ADMIN — SEVELAMER CARBONATE 1 GM: 2400 POWDER, FOR SUSPENSION ORAL at 08:43

## 2018-01-16 RX ADMIN — LEVETIRACETAM 1 MG: 500 TABLET ORAL at 21:00

## 2018-01-16 RX ADMIN — INSULIN ASPART 1 UNIT: 100 INJECTION, SOLUTION INTRAVENOUS; SUBCUTANEOUS at 21:47

## 2018-01-16 RX ADMIN — MUPIROCIN 1 APPLIC: 20 OINTMENT TOPICAL at 21:00

## 2018-01-17 RX ADMIN — MUPIROCIN 1 APPLIC: 20 OINTMENT TOPICAL at 08:44

## 2018-01-17 RX ADMIN — INSULIN ASPART 1 UNIT: 100 INJECTION, SOLUTION INTRAVENOUS; SUBCUTANEOUS at 06:00

## 2018-01-17 RX ADMIN — LEVETIRACETAM 1 MG: 500 TABLET ORAL at 00:58

## 2018-01-17 RX ADMIN — INSULIN ASPART 1 UNIT: 100 INJECTION, SOLUTION INTRAVENOUS; SUBCUTANEOUS at 13:02

## 2018-01-17 RX ADMIN — SEVELAMER CARBONATE 1 GM: 2400 POWDER, FOR SUSPENSION ORAL at 12:57

## 2018-01-17 RX ADMIN — LEVETIRACETAM 1 MG: 500 TABLET ORAL at 08:44

## 2018-01-17 RX ADMIN — SEVELAMER CARBONATE 1 GM: 2400 POWDER, FOR SUSPENSION ORAL at 17:28

## 2018-01-17 RX ADMIN — INSULIN ASPART 1 UNIT: 100 INJECTION, SOLUTION INTRAVENOUS; SUBCUTANEOUS at 21:49

## 2018-01-17 RX ADMIN — LEVETIRACETAM 1 MG: 500 TABLET ORAL at 21:48

## 2018-01-17 RX ADMIN — SEVELAMER CARBONATE 1 GM: 2400 POWDER, FOR SUSPENSION ORAL at 08:44

## 2018-01-17 RX ADMIN — MUPIROCIN 1 APPLIC: 20 OINTMENT TOPICAL at 21:49

## 2018-01-17 RX ADMIN — FAMOTIDINE 1 MG: 20 TABLET ORAL at 08:44

## 2018-01-17 RX ADMIN — MUPIROCIN 1 APPLIC: 20 OINTMENT TOPICAL at 21:00

## 2018-01-18 LAB
ANION GAP: 19 (ref 8–16)
BLOOD UREA NITROGEN: 57 MG/DL (ref 7–20)
CALCIUM: 9.8 MG/DL (ref 8.4–10.2)
CARBON DIOXIDE: 24 MMOL/L (ref 21–31)
CHLORIDE: 99 MMOL/L (ref 97–110)
CREATININE: 4.43 MG/DL (ref 0.61–1.24)
GLUCOSE: 90 MG/DL (ref 70–220)
POTASSIUM: 4.8 MMOL/L (ref 3.5–5.1)
SODIUM: 137 MMOL/L (ref 135–144)

## 2018-01-18 RX ADMIN — SEVELAMER CARBONATE 1 GM: 2400 POWDER, FOR SUSPENSION ORAL at 17:34

## 2018-01-18 RX ADMIN — SEVELAMER CARBONATE 1 GM: 2400 POWDER, FOR SUSPENSION ORAL at 13:55

## 2018-01-18 RX ADMIN — INSULIN ASPART 1 UNIT: 100 INJECTION, SOLUTION INTRAVENOUS; SUBCUTANEOUS at 05:20

## 2018-01-18 RX ADMIN — SEVELAMER CARBONATE 1 GM: 2400 POWDER, FOR SUSPENSION ORAL at 10:13

## 2018-01-18 RX ADMIN — MUPIROCIN 1 APPLIC: 20 OINTMENT TOPICAL at 10:13

## 2018-01-18 RX ADMIN — LEVETIRACETAM 1 MG: 500 TABLET ORAL at 10:13

## 2018-01-18 RX ADMIN — MUPIROCIN 1 APPLIC: 20 OINTMENT TOPICAL at 21:00

## 2018-01-18 RX ADMIN — INSULIN ASPART 1 UNIT: 100 INJECTION, SOLUTION INTRAVENOUS; SUBCUTANEOUS at 13:56

## 2018-01-18 RX ADMIN — INSULIN ASPART 1 UNIT: 100 INJECTION, SOLUTION INTRAVENOUS; SUBCUTANEOUS at 21:40

## 2018-01-18 RX ADMIN — MUPIROCIN 1 APPLIC: 20 OINTMENT TOPICAL at 21:38

## 2018-01-18 RX ADMIN — MORPHINE SULFATE 1 MG: 2 INJECTION, SOLUTION INTRAMUSCULAR; INTRAVENOUS at 22:48

## 2018-01-18 RX ADMIN — LEVETIRACETAM 1 MG: 500 TABLET ORAL at 21:38

## 2018-01-19 LAB
ADD MAN DIFF?: NO
ANION GAP: 17 (ref 8–16)
BASOPHIL #: 0 10^3/UL (ref 0–0.1)
BASOPHILS %: 0.8 % (ref 0–2)
BLOOD UREA NITROGEN: 82 MG/DL (ref 7–20)
CALCIUM: 10.4 MG/DL (ref 8.4–10.2)
CARBON DIOXIDE: 24 MMOL/L (ref 21–31)
CHLORIDE: 99 MMOL/L (ref 97–110)
CREATININE: 5.63 MG/DL (ref 0.61–1.24)
EOSINOPHILS #: 0.9 10^3/UL (ref 0–0.5)
EOSINOPHILS %: 16.5 % (ref 0–7)
GLUCOSE: 70 MG/DL (ref 70–220)
HEMATOCRIT: 32.3 % (ref 42–52)
HEMOGLOBIN: 10.1 G/DL (ref 14–18)
LYMPHOCYTES #: 0.7 10^3/UL (ref 0.8–2.9)
LYMPHOCYTES %: 13.8 % (ref 15–51)
MEAN CORPUSCULAR HEMOGLOBIN: 30.6 PG (ref 29–33)
MEAN CORPUSCULAR HGB CONC: 31.3 G/DL (ref 32–37)
MEAN CORPUSCULAR VOLUME: 97.9 FL (ref 82–101)
MEAN PLATELET VOLUME: 12.5 FL (ref 7.4–10.4)
MONOCYTE #: 0.6 10^3/UL (ref 0.3–0.9)
MONOCYTES %: 11.7 % (ref 0–11)
NEUTROPHIL #: 2.9 10^3/UL (ref 1.6–7.5)
NEUTROPHILS %: 57 % (ref 39–77)
NUCLEATED RED BLOOD CELLS #: 0 10^3/UL (ref 0–0)
NUCLEATED RED BLOOD CELLS%: 0 /100WBC (ref 0–0)
PLATELET COUNT: 179 10^3/UL (ref 140–415)
POTASSIUM: 4.8 MMOL/L (ref 3.5–5.1)
RED BLOOD COUNT: 3.3 10^6/UL (ref 4.7–6.1)
RED CELL DISTRIBUTION WIDTH: 16 % (ref 11.5–14.5)
SODIUM: 135 MMOL/L (ref 135–144)
WHITE BLOOD COUNT: 5.2 10^3/UL (ref 4.8–10.8)

## 2018-01-19 RX ADMIN — ACETAMINOPHEN 1 MG: 325 TABLET, FILM COATED ORAL at 20:58

## 2018-01-19 RX ADMIN — MUPIROCIN 1 APPLIC: 20 OINTMENT TOPICAL at 10:14

## 2018-01-19 RX ADMIN — SEVELAMER CARBONATE 1 GM: 2400 POWDER, FOR SUSPENSION ORAL at 13:46

## 2018-01-19 RX ADMIN — LEVETIRACETAM 1 MG: 500 TABLET ORAL at 10:10

## 2018-01-19 RX ADMIN — FAMOTIDINE 1 MG: 20 TABLET ORAL at 10:09

## 2018-01-19 RX ADMIN — SEVELAMER CARBONATE 1 GM: 2400 POWDER, FOR SUSPENSION ORAL at 10:10

## 2018-01-19 RX ADMIN — MUPIROCIN 1 APPLIC: 20 OINTMENT TOPICAL at 20:59

## 2018-01-19 RX ADMIN — MUPIROCIN 1 APPLIC: 20 OINTMENT TOPICAL at 10:11

## 2018-01-19 RX ADMIN — INSULIN ASPART 1 UNIT: 100 INJECTION, SOLUTION INTRAVENOUS; SUBCUTANEOUS at 13:45

## 2018-01-19 RX ADMIN — INSULIN ASPART 1 UNIT: 100 INJECTION, SOLUTION INTRAVENOUS; SUBCUTANEOUS at 22:00

## 2018-01-19 RX ADMIN — SEVELAMER CARBONATE 1 GM: 2400 POWDER, FOR SUSPENSION ORAL at 18:50

## 2018-01-19 RX ADMIN — LEVETIRACETAM 1 MG: 500 TABLET ORAL at 20:59

## 2018-01-19 RX ADMIN — INSULIN ASPART 1 UNIT: 100 INJECTION, SOLUTION INTRAVENOUS; SUBCUTANEOUS at 06:00

## 2018-01-20 RX ADMIN — SEVELAMER CARBONATE 1 GM: 2400 POWDER, FOR SUSPENSION ORAL at 07:55

## 2018-01-20 RX ADMIN — MUPIROCIN 1 APPLIC: 20 OINTMENT TOPICAL at 09:00

## 2018-01-20 RX ADMIN — INSULIN ASPART 1 UNIT: 100 INJECTION, SOLUTION INTRAVENOUS; SUBCUTANEOUS at 05:50

## 2018-01-20 RX ADMIN — LEVETIRACETAM 1 MG: 500 TABLET ORAL at 21:11

## 2018-01-20 RX ADMIN — SEVELAMER CARBONATE 1 GM: 2400 POWDER, FOR SUSPENSION ORAL at 18:49

## 2018-01-20 RX ADMIN — INSULIN ASPART 1 UNIT: 100 INJECTION, SOLUTION INTRAVENOUS; SUBCUTANEOUS at 21:50

## 2018-01-20 RX ADMIN — MUPIROCIN 1 APPLIC: 20 OINTMENT TOPICAL at 21:12

## 2018-01-20 RX ADMIN — INSULIN ASPART 1 UNIT: 100 INJECTION, SOLUTION INTRAVENOUS; SUBCUTANEOUS at 14:00

## 2018-01-20 RX ADMIN — SEVELAMER CARBONATE 1 GM: 2400 POWDER, FOR SUSPENSION ORAL at 11:50

## 2018-01-20 RX ADMIN — MUPIROCIN 1 APPLIC: 20 OINTMENT TOPICAL at 21:13

## 2018-01-20 RX ADMIN — LEVETIRACETAM 1 MG: 500 TABLET ORAL at 09:00

## 2018-01-21 RX ADMIN — SEVELAMER CARBONATE 1 GM: 2400 POWDER, FOR SUSPENSION ORAL at 07:55

## 2018-01-21 RX ADMIN — SEVELAMER CARBONATE 1 GM: 2400 POWDER, FOR SUSPENSION ORAL at 11:50

## 2018-01-21 RX ADMIN — MUPIROCIN 1 APPLIC: 20 OINTMENT TOPICAL at 22:33

## 2018-01-21 RX ADMIN — LEVETIRACETAM 1 MG: 500 TABLET ORAL at 21:30

## 2018-01-21 RX ADMIN — INSULIN ASPART 1 UNIT: 100 INJECTION, SOLUTION INTRAVENOUS; SUBCUTANEOUS at 06:00

## 2018-01-21 RX ADMIN — MUPIROCIN 1 APPLIC: 20 OINTMENT TOPICAL at 21:00

## 2018-01-21 RX ADMIN — MUPIROCIN 1 APPLIC: 20 OINTMENT TOPICAL at 09:00

## 2018-01-21 RX ADMIN — INSULIN ASPART 1 UNIT: 100 INJECTION, SOLUTION INTRAVENOUS; SUBCUTANEOUS at 14:00

## 2018-01-21 RX ADMIN — LEVETIRACETAM 1 MG: 500 TABLET ORAL at 09:00

## 2018-01-21 RX ADMIN — SEVELAMER CARBONATE 1 GM: 2400 POWDER, FOR SUSPENSION ORAL at 18:02

## 2018-01-21 RX ADMIN — INSULIN ASPART 1 UNIT: 100 INJECTION, SOLUTION INTRAVENOUS; SUBCUTANEOUS at 22:00

## 2018-01-21 RX ADMIN — FAMOTIDINE 1 MG: 20 TABLET ORAL at 09:00

## 2018-01-22 RX ADMIN — LEVETIRACETAM 1 MG: 500 TABLET ORAL at 10:23

## 2018-01-22 RX ADMIN — SEVELAMER CARBONATE 1 GM: 2400 POWDER, FOR SUSPENSION ORAL at 10:23

## 2018-01-22 RX ADMIN — LEVETIRACETAM 1 MG: 500 TABLET ORAL at 21:15

## 2018-01-22 RX ADMIN — MUPIROCIN 1 APPLIC: 20 OINTMENT TOPICAL at 09:00

## 2018-01-22 RX ADMIN — INSULIN ASPART 1 UNIT: 100 INJECTION, SOLUTION INTRAVENOUS; SUBCUTANEOUS at 04:00

## 2018-01-22 RX ADMIN — SEVELAMER CARBONATE 1 GM: 2400 POWDER, FOR SUSPENSION ORAL at 15:30

## 2018-01-22 RX ADMIN — INSULIN ASPART 1 UNIT: 100 INJECTION, SOLUTION INTRAVENOUS; SUBCUTANEOUS at 15:30

## 2018-01-22 RX ADMIN — MUPIROCIN 1 APPLIC: 20 OINTMENT TOPICAL at 21:15

## 2018-01-22 RX ADMIN — SEVELAMER CARBONATE 1 GM: 2400 POWDER, FOR SUSPENSION ORAL at 22:07

## 2018-01-22 RX ADMIN — SEVELAMER CARBONATE 1 GM: 2400 POWDER, FOR SUSPENSION ORAL at 16:07

## 2018-01-22 RX ADMIN — MUPIROCIN 1 APPLIC: 20 OINTMENT TOPICAL at 10:24

## 2018-01-22 RX ADMIN — INSULIN ASPART 1 UNIT: 100 INJECTION, SOLUTION INTRAVENOUS; SUBCUTANEOUS at 22:00

## 2018-01-23 RX ADMIN — INSULIN ASPART 1 UNIT: 100 INJECTION, SOLUTION INTRAVENOUS; SUBCUTANEOUS at 05:42

## 2018-01-23 RX ADMIN — FAMOTIDINE 1 MG: 20 TABLET ORAL at 07:45

## 2018-01-23 RX ADMIN — HEPARIN SODIUM 1 UNIT: 1000 INJECTION, SOLUTION INTRAVENOUS; SUBCUTANEOUS at 03:33

## 2018-01-23 RX ADMIN — LEVETIRACETAM 1 MG: 500 TABLET ORAL at 07:45

## 2018-01-23 RX ADMIN — MUPIROCIN 1 APPLIC: 20 OINTMENT TOPICAL at 07:46

## 2018-01-23 RX ADMIN — MUPIROCIN 1 APPLIC: 20 OINTMENT TOPICAL at 07:45

## 2018-01-23 RX ADMIN — SEVELAMER CARBONATE 1 GM: 2400 POWDER, FOR SUSPENSION ORAL at 05:42

## 2018-09-08 NOTE — NUR
PT IS ASSIGNED TO [facility]#: [id number], DX: ANEMIA, AND ACCEPTING MD: DR. REDMOND

## 2018-09-08 NOTE — NUR
PATIENT WAS BROUGHT TO THE UNIT FROM ER ON A GURNEY ACCOMPANIED BY RN, CNA AND RT. 
MECHANICAL VENTILATION SET UP BY RT IN THE ROOM WITH ORDERED SETTINGS. PATIENT WAS MADE 
COMFORTABLE IN BED, VITAL SIGNS TAKEN. PATIENT IS STABLE AT THIS TIME. CARDIAC MONITOR 
APPLIED. SAFETY MEASURES PLACED, BED IN LOW LOCKED POSITION, SIDE RAILS UP X3, CALL LIGHT 
WITHIN EASY REACH. NO ASSESSMENT COMPLETED AT THIS TIME DUE TO PATIENT'S LATE ARRIVAL TO THE 
UNIT. MD MADE AWARE ABOUT PATIENT'S ARRIVAL. WILL ENDORSE TO PM NURSE FOR ELÍAS.

## 2018-09-08 NOTE — NUR
RECEIVED REPORT FROM ER RN DANIEL. PATIENT IS BEING ADMITTED TO TELEMETRY FLOOR WITH DX OF 
SEVERE ANEMIA.

## 2018-09-08 NOTE — NUR
RN TELE OPENING NOTES

PATIENT RECEIVED IN BED WITH EYES CLOSE. EASILY AROUSABLE. NONVERBAL. IN STABLE CONDITION. 
BREATHING EVEN AND UNLABORED. ON VENT WITH ORDERED SETTINGS. IV ON RIGHT HAND INTACT AND 
PATENT. PATIENT WITH NOTED WITH LEFT CHEST WALL CATH USED FOR DIALYSIS. PATIENT ALSO WITH 
GTUBE. NO GTUBE FEEDING ORDER IN PLACE YET. GTUBE SITE INTACT AND PATENT. BILATERAL UPPER 
EXTREMITY EDEMA EVIDENT. NOTED WITH SLIGHT BLEEDING ON LEFT ARM - APPLIED PRESSURE AND 
GAUZE. ALL OTHER NEEDS ATTENDED TO. CALL LIGHT WITHIN REACH. BED ON LOWEST LOCKED POSITION. 
WILL CONTINUE TO MONITOR.

## 2018-09-08 NOTE — NUR
PT REC'D TO ER VIA EMS  POST  DIALYISIS  LOW H/H  . IV  STARATED RT HAND 22  
FLUSHED LABS DRAWNS SENT O LAB  VENT AND TRACHED RT UPPER ARM  COVERED DECUB   
DRY  CLAEAN   LEFT UPPER ARM POSR SITE FOR DIAYSIS   SWELLEN  HANDS / GIOVANNI 
CATH  UPPER LEFT CHEST

## 2018-09-09 NOTE — NUR
RT



MALE PT RECEIVED TRACHED AND ON MECH VENT W ORDERED SETTINGS. ALARMS ARE ON AND AUDIBLE W 
VENT PLUGGED INTO RED OUTLET AND AMBUBAG @ HOB. PT IS NONVERBAL. MODERATE AMNT OF THICK 
WHITE SECRETIONS OBSERVED. NO RESPIRATORY DISTRESS NOTED AT THIS TIME. WILL CONTINUE TO 
MONITOR.

## 2018-09-09 NOTE — NUR
TELE RN OPENING NOTES



RECEIVED PT LAYING IN BED WITH HOB SLIGHTLY ELEVATED. PT IS AWAKE AND ALERT, BUT NONVERBAL. 
PT IS CURRENTLY ON VENT AND TOLERATING WELL. PT DOES NOT APPEAR IN DISTRESS. GT INTACT, NO 
RESIDUAL NOTED AT THIS TIME. PT IS CURRENTLY TOLERATING GTF. IV SITE IS INTACT, NO 
INFILTRATION NOTED. DRESSING KEPT CLEAN AND DRY. PT REFUSED BLOOD DRAW. EXPLAINED THE 
IMPORTANCE OF BLOOD DRAW FOR LABS, PT NODDED HEAD IN RESPONSE FOR LAB TO COME BACK LATER. 
SAFETY MEASURES ARE IN PLACE. WILL CONTINUE TO MONITOR THROUGHOUT SHIFT FOR CONTINUITY OF 
CARE.

## 2018-09-09 NOTE — NUR
RN TELE NOTES

VERIFIED WITH DR. AMELIE GILL REGARDING PATIENT NOT HAVING AN IV ACCESS. PER DR. GILL, THERE 
IS NO NEED FOR IV ACCESS.

## 2018-09-09 NOTE — NUR
PT RCHOMERO GANNON'D ON MECHANICAL VENT WITH CHARTED SETTINGS. SX DONE. PT TRACH PATENT AND 
SECURE. AMBU BAG AT BEDSIDE. VENT PLUGGED INTO RED OUTLET. ALARMS ARE ON AND AUDIBLE. WILL 
CONTINUE TO MONITOR.

-------------------------------------------------------------------------------

Addendum: 09/09/18 at 2011 by BONNY GOMEZ RT

-------------------------------------------------------------------------------

Amended: Links added.

## 2018-09-09 NOTE — NUR
TELE RN CLOSING NOTES



ALL DUE MEDS GIVEN, NEEDS MET AND RENDERED. PT REMAINS ALERT AND RESPONSIVE, HOWEVER 
NONVERBAL. AFEBRILE. RESPIRATIONS ARE EVEN AND UNLABORED, NOT IN ANY ACUTE DISTRESS NOTED. 
CURRENTLY ON MECHANICAL VENT, TOLERATING WELL WITH FREQUENT SUCTIONING. PT PULLED OUT IV, 
DR. GILL MADE AWARE AND STATED NO NEED AT THIS TIME. DIALYSIS DONE WITH 2500CC OUT. SAFETY 
MEASURES ARE IN PLACE. REMINDED PT TO USE CALL LIGHT WHEN ASSISTANCE IS NEEDED, CALL LIGHT 
IS LEFT WITHIN REACH. WILL ENDORSE TO NEXT SHIFT FOR CONTINUITY OF CARE.

## 2018-09-09 NOTE — NUR
RN TELE CLOSING NOTES

PATIENT IN BED ASLEEP. EASILY AROUSABLE. NONVERBAL. IN STABLE CONDITION. BREATHING EVEN AND 
UNLABORED. ON VENT WITH ORDERED SETTINGS. IV ON RIGHT HAND INTACT AND PATENT. S/P BLOOD 
TRANSFUSION. TOLERATED WELL. PATIENT'S LEFT CHEST WALL CATH DRESSING INTACT. GTUBE INTACT 
AND PATENT - INFUSING NEPRO AT 60ML/HR - TOLERATING WELL. NO S/S OF BLEEDING. ALL OTHER 
NEEDS ATTENDED TO. CALL LIGHT WITHIN REACH. BED ON LOWEST LOCKED POSITION. WILL ENDORSE TO 
ONCOMING NURSE FOR CONTINUITY OF CARE.

## 2018-09-09 NOTE — NUR
RN TELE OPENING NOTES

PATIENT IN BED ASLEEP. EASILY AROUSABLE. NONVERBAL. IN STABLE CONDITION. BREATHING EVEN AND 
UNLABORED. ON VENT WITH ORDERED SETTINGS. NO IV ACCESS - MD AWARE. PATIENT'S LEFT CHEST WALL 
CATH DRESSING INTACT. GTUBE INTACT AND PATENT - INFUSING NEPRO AT 60ML/HR - TOLERATING WELL. 
NO S/S OF BLEEDING. ALL OTHER NEEDS ATTENDED TO. CALL LIGHT WITHIN REACH. BED ON LOWEST 
LOCKED POSITION. WILL CONTINUE TO MONITOR.

## 2018-09-09 NOTE — NUR
CALLED [facility], SPOKE W/ NURSE MAMTA AND STATED THAT PT STARTED TO DECLINE A 
MONTH AGO, WAS ABLE TO AMBULATE AND RECEIVE ORAL GRATIFICATION AT THE TIME BUT WAS NPO SINCE 
PT STARTED DECLINING.

## 2018-09-09 NOTE — NUR
SANTOS MEADE NOTES

BLOOD TRANSFUSION ENDED. PATIENT IN STABLE CONDITION. NO ADVERSE REACTIONS FROM BLOOD 
TRANSFUSION. VITAL SIGNS IMPROVED. WILL CONTINUE TO MONITOR.

-------------------------------------------------------------------------------

Addendum: 09/09/18 at 0629 by JUSTEN DORSEY RN

-------------------------------------------------------------------------------

1 UNIT OF PRBC INFUSED.

## 2018-09-10 NOTE — NUR
RN TELE CLOSING NOTES

PATIENT IN BED ASLEEP. EASILY AROUSABLE. NONVERBAL. IN STABLE CONDITION. BREATHING EVEN AND 
UNLABORED. ON VENT WITH ORDERED SETTINGS. NO S/S OF PAIN. NO FACIAL GRIMACING. NO IV ACCESS 
- MD AWARE. PATIENT'S LEFT CHEST WALL CATH DRESSING INTACT. GTUBE INTACT AND PATENT - 
INFUSING NEPRO AT 60ML/HR - TOLERATING WELL. NO RESIDUAL THROUGHOUT SHIFT. PATIENT NOTED 
WITH SCANT BLEEDING ON LEFT ARM. CLEANED AND COVERED WITH DRESSING. ALL OTHER NEEDS ATTENDED 
TO. CALL LIGHT WITHIN REACH. BED ON LOWEST LOCKED POSITION. WILL ENDORSE TO ONCOMING NURSE 
FOR CONTINUITY OF CARE.

## 2018-09-10 NOTE — NUR
RN OPENING NOTES

PATIENT AWAKE AND ALERT. RESTING IN BED. PT IS NONVERBAL WITH TRACH. NO COMPLAINTS OF PAIN 
SOB OR DISTRESS AT THIS TIME. PT IS TELE MONITORED AT NSR RATE IN THE 70S WITH BBB. PT HAS 
GTUBE FEEDING RUNNING NEPRO @45ML/HR. NO IV ACCESS MD AWARE. PER DAY SHIFT NURSE PT WILL 
HAVE HD TOMORROW (9/11/18). CONTINUOUS PULSE OX. SAFETY PRECAUTIONS IN PLACE, BED IN LOWEST 
LOCKED POSITION, X3 SIDE RAILS UP, AND CALL LIGHT WITHIN REACH.

## 2018-09-10 NOTE — NUR
TELE RN CLOSING NOTES

Report given. Patient awake, alert and non-verbal. Patient experiences pain but refuses to 
take pain mgmt. On vent machine with oxygen saturation of 100%. Trach tie and dressing 
changed. No SOB/labored breathing noted. Suctioned as needed. Not in any type of distress. 
Gtube feeding: Nepro @45ml/hrn for 24hrs, no residual, tolerating well. GT dressing and 
trach tie&dressing changed. Turned and repositioned every two hours with bony prominences 
offloaded. TELE Monitor shows: Sinus Rhythm; HR 74 with BBB. Awaiting call back from Lisa BAH (Next of Kin) to get approval for right arm wound debridement consent. Last call attempt 
@1600. All needs anticipated and met. Bed in locked and lowest position with bed alarm on 
and call light within reach. Endorsed to oncoming shift nurse.

## 2018-09-10 NOTE — NUR
INFORMATION SENT :FACESHEET , ADMIT ORDER , H&P , CONSULTATION , DC PLANNING , UR 9/9



HEALTH PLAN / FAX NUMBER : [phone]



GROUP/ FAX NUMBER :[phone]



FAX STATUS : SENT

## 2018-09-10 NOTE — NUR
TELE RN NOTES

Calling Next of Kin (Lisa Kaplan) for Right arm wound debridement consent for patient.

Called [phone] = non-working number

Called [phone] = unable to reach per machine

Called [phone] = Left a vmail to call [facility] for consent. 

Will wait for call-back

## 2018-09-10 NOTE — NUR
TELE RN INITIAL NOTES

Report received at bedside. Patient received in bed, awake and comfortable. Alert and 
oriented, non-verbal with trach. Tele monitor shows: Sinus Rhythm; HR 74. Denies any type of 
pain at the moment. No facial grimacing or moaning noted. Not in any type of distress. On 
vent with No SOB/Labored breathing noted. HOB slightly elevated. Safety measures in place. 
Will continue to monitor and assess patient.

## 2018-09-10 NOTE — NUR
RT



MALE PT RECEIVED TRACHED AND ON [facility] VENT W ORDERED SETTINGS. ALARMS ARE ON AND AUDIBLE W 
VENT PLUGGED INTO RED OUTLET AND AMBUBAG @ HOB. PT IS NONVERBAL. MODERATE AMNT OF THICK 
WHITE/YELLOW SECRETIONS SUCTIONED. NO RESPIRATORY DISTRESS NOTED AT THIS TIME. WILL CONTINUE 
TO MONITOR.

-------------------------------------------------------------------------------

Addendum: 09/10/18 at 1346 by MADELYN NEWSOME RT

-------------------------------------------------------------------------------

Amended: Links added.

## 2018-09-11 NOTE — NUR
RN TELE  NOTES,



ENDORSED PATIENT FOR CONTINUITY OF CARE FROM LISE, ALERT AND ORIENTED, MOTH WORDS, ON 
VENT WITH ORDERED SETTINGS. NO IV ACCESS  MD AWARE. PATIENT'S LEFT CHEST WALL CATH DRESSING 
INTACT. GTUBE INTACT AND PATENT INFUSING WELL AND PATIENT TOLERATED WELL,  HOB ELEVATED AT 
ALL TIMES FOR ASPIRATION PRECAUTIONS, CALL LIGHT WITHIN REACH. BED ON LOWEST LOCKED 
POSITION. WILL CONTINUE TO MONITOR CLOSELY.

## 2018-09-11 NOTE — NUR
RN OPENING NOTES

PATIENT AWAKE AND ALERT. RESTING IN BED. PT IS NONVERBAL WITH TRACH. PT ABLE TO MOUTH WORDS. 
NO COMPLAINTS OF PAIN SOB OR DISTRESS AT THIS TIME. DIALYSIS NURSE AT BEDSIDE. PT IS TELE 
MONITORED AT NSR RATE IN THE 70S WITH BBB. PT HAS GTUBE FEEDING RUNNING NEPRO @45ML/HR. NO 
IV ACCESS MD AWARE. PT HAS CONTINUOUS PULSE OX MONITORING. SAFETY PRECAUTIONS IN PLACE, BED 
IN LOWEST LOCKED POSITION, X3 SIDE RAILS UP, AND CALL LIGHT WITHIN REACH. WILL CONTINUE TO 
MONITOR.

## 2018-09-11 NOTE — NUR
TELE RN - NOTES

Attempted to call [phone] = non working number

Attempted to call [phone] = left a voicemail

Attempted to call [phone] = left a voicemail



waiting for call back from Lisa BAH (Next of Kin) for consent approval for right arm wound 
debridement.

## 2018-09-11 NOTE — NUR
WOUND CARE CONSULT  WOUND CARE RECEIVED CONSULT FOR RIGHT SHOULDER DECUBITUS ULCER.  WOUND 
CARE WILL DEFER CONSULT AND ALL TREATMENT PLANS TO SURGICAL TEAM WHO ARE CURRENTLY 
FOLLOWING.  PATIENT WITH ZHAO AT 11, ALL PRESSURE ULCER PREVENTION MEASURES ARE NOTED TO 
BE IN PLACE.  WILL SEE PRN.

## 2018-09-11 NOTE — NUR
RN TELE CLOSING NOTES,



PATIENT SLEEPING AT THIS TIME, ON VENT WITH ORDERED SETTINGS, NO IV ACCESS  MD AWARE,  
PATIENT'S LEFT CHEST WALL CATH DRESSING INTACT. GTUBE INTACT AND PATENT, GTF  INFUSING WELL 
AND PATIENT TOLERATED WELL,  HOB ELEVATED AT ALL TIMES FOR ASPIRATION PRECAUTIONS, 
SUCTIONING PROVIDED AS NEEDED,  DRY AND CLEAN, CALL LIGHT WITHIN REACH. BED ON LOWEST LOCKED 
POSITION, NO SIGNIFICANT CHANGE IN CONDITION DURING THE NIGHT, WILL ENDORSE TO ONCOMING 
NURSE FOR CONTINUITY OF CARE.

## 2018-09-11 NOTE — NUR
RT



MALE PT RECEIVED TRACHED (CUFFED) AND ON [facility] VENT W ORDERED SETTINGS. ALARMS ARE ON AND 
AUDIBLE W VENT PLUGGED INTO RED OUTLET AND AMBUBAG @ HOB. PT IS NONVERBAL. MODERATE AMNT OF 
THICK WHITE/YELLOW SECRETIONS SUCTIONED. NO RESPIRATORY DISTRESS NOTED AT THIS TIME. WILL 
CONTINUE TO MONITOR.

-------------------------------------------------------------------------------

Addendum: 09/11/18 at 0822 by MADELYN NEWSOME RT

-------------------------------------------------------------------------------

Amended: Links added.

## 2018-09-11 NOTE — NUR
TELE RN INITIAL NOTES

Report received. Patient received in bed, sleeping intermittently, easily aroused. Alert and 
oriented, non-verbal with trach. Tele monitor shows: Sinus Rhythm; HR 72 with BBB. Denies 
any type of pain at the moment. No facial grimacing or moaning noted. Not in any type of 
distress. On vent with No SOB/Labored breathing noted. HOB slightly elevated. Safety 
measures in place. Will continue to monitor and assess patient.

## 2018-09-11 NOTE — NUR
PT [facility] TRACH PORTEX 9 ON MECHANICAL VENT WITH NOTED SETTINGS. PT IS AWAKE, ALERT AND NON 
VERBAL.  SUCTIONED MODERATE AMOUNT OF YELLOW THICK SECRETIONS.  PT TRACH PATENT AND SECURE.  
MLT DONE. BILATERAL BS NOTED. NO RESPIRATORY DISTRESS  NOTED AT THIS TIME. AMBU BAG AT 
BEDSIDE. VENT PLUGGED INTO RED OUTLET. ALARMS ARE ON AND AUDIBLE. WILL CONTINUE TO MONITOR.

## 2018-09-11 NOTE — NUR
TELE RN CLOSING NOTES

Report given. Patient awake, alert and non-verbal. Denies any pain. No facial grimacing or 
moaning noted. On vent machine with oxygen saturation of 100%. Wound care rendered. Dressing 
changed. No SOB/labored breathing noted. Suctioned as needed. Not in any type of distress. 
Gtube feeding: Nepro @45ml/hrn for 24hrs, no residual, tolerating well. Turned and 
repositioned every two hours with bony prominences offloaded. TELE Monitor shows: Sinus 
Rhythm; HR 70s with BBB. Attempted to call Lisa BAH (Next of Kin) to get approval for right 
arm wound debridement consent; awaiting call back. Claudia JONES NP (Wound specialist) aware. All 
needs anticipated and met. HD in progress with Dialysis nurse at bedside. Bed in locked and 
lowest position with bed alarm on and call light within reach. Endorsed to oncoming shift 
nurse.

## 2018-09-12 NOTE — NUR
RN TELE OPENING NOTES

RECEIVED PATIENT IN BED AWAKE. ALERT AND ORIENTED X1. NONVERBAL BREATHING EVEN AND 
UNLABORED. ON VENT WITH ORDERED SETTINGS. NO S/S OF PAIN OR DISCOMFORT. NO FACIAL GRIMACING. 
NO IV ACCESS - MD AWARE. PATIENT WITH LCW PERMA CATH - DRESSING INTACT. PATIENT ALSO WITH 
GTUBE FEEDING - INFUSING NEPRO AT 60ML/HR. ALL OTHER NEEDS ATTENDED TO.  CALL LIGHT WITHIN 
REACH. BED ON LOWEST LOCKED POSITION. WILL CONTINUE TO MONITOR.

## 2018-09-12 NOTE — NUR
RT



RECEIVED PT TRACH VENT DEPENDENT WITH NOTED SETTINGS. MLT DONE AND TRACH IS SECURE. VENT 
ALARMS CHECKED AND AUDIBLE. VENT PLUGGED IN RED OUTLET. SX WITH MOD THK YELLOW/TAN 
SECRETIONS. AMBU BAG NOTED HOB. PT TOLERATING SETTINGS WELL, NO SOB OR RESP DISTRESS NOTED. 
PT ON CONTINUOUS PULSE OX. WILL CONTINUE TO MONITOR T/O SHIFT.

## 2018-09-12 NOTE — NUR
TELE RN OPENING NOTES. PT RECEIVED ALER TO SELF AND ABLE TO MAKE NEEDS KNOWN, ABLE TO MOUTH 
WORDS. PT WITH VENT AND TRACH, SETTINGS REVIEWED AND ALARMS AUDIBLE, PLUGGED TO RED POINTS. 
PT WITHOUT IVC MD AWARE. PT WITH GTF AND NAD AT SITE. PT BREIFED ON POC AND IS WITHOUT S/S 
OF DISTRESS OR DISCOMFORT AT THIS TIME. BED IN LOWEST LOCKED WITH HANDRAILSX2 AND CALL BELL 
WITHIN REACH. WILL CONTINUE POC.

## 2018-09-12 NOTE — NUR
WOUND CARE CONSULT   WOUND CARE RECEIVED CONSULT FOR ANDREZ CHEEK/NECK REDNESS/SWELLING WITH 
MIN BLEED FROM SCRATCH.  WOUND CARE WILL DEFER CONSULT AND ALL TREATMENT PLANS TO SURGICAL 
TEAM WHO ARE CURRENTLY FOLLOWING.  WILL SEE PRN.

## 2018-09-12 NOTE — NUR
TELE RN CLOSING NOTES. PT REMIANS ALERT TO SELF AND ABLE TO MAKE NEEDS KNOWN. PT WITH VENT 
AND TRACH, SETTINGS CORRECT AND ALARMS AUDIBLE, PLUGGED TO RED POINTS. PT WITH GTF INFUSING 
AND NAD AT SITE. PT REMOVED LCW PC DRESSING, WILL ENDORSE TO NIGHT NURSE. PT OCCASIONALLY 
PICKING AT DRESSINGS. PT REPO HOURLY. WOUND CARE COMPLETED AS PER RX. PT BED IN LOWEST 
LOCKED WITH HANDRQAILSX2 AND CALL BELL WITHIN REACH. WILL ENDORSE TO NIGHT NURSE.

## 2018-09-12 NOTE — NUR
TELE RN. TRACH CARE COMPLETED AND REDNESS NOTED AT TUBE TIE, AREA CLEANED, DRYED AND Z GAURD 
APPLIED. WOUND CONSULT REQUESTED FOR AREA.

## 2018-09-12 NOTE — NUR
RN CLOSING NOTES

PATIENT AWAKE AND ALERT. RESTING IN BED. PT IS NONVERBAL WITH TRACH. PT ABLE TO MOUTH WORDS. 
NO COMPLAINTS OF PAIN SOB OR DISTRESS OVERNIGHT. PT IS TELE MONITORED AT NSR RATE IN THE 70S 
WITH BBB. PT HAS GTUBE FEEDING RUNNING NEPRO @45ML/HR. NO IV ACCESS MD AWARE.  PT HAS 
CONTINUOUS PULSE OX MONITORING. PT HAD DIALYSIS 9/11/18 1500 OUT. SAFETY PRECAUTIONS IN 
PLACE, BED IN LOWEST LOCKED POSITION, X3 SIDE RAILS UP, AND CALL LIGHT WITHIN REACH. ALL 
NEEDS MET OVERNIGHT. WILL ENDORSE TO DAY SHIFT NURSE FOR CONTINUITY OF CARE.

## 2018-09-13 NOTE — NUR
TELE RN D/C NOTES. PT PREPARED FOR D/C AS PER MD. PT ALERT TO SELF. TOLERATING VENT AND 
TRACH WITHOUT DISTRESS. PT WITH G.TUBE INTACT, NAD AT SITE AND FLUSHED CLEAR PRIOR TO 
TRANSPORT. ALL WOUND CARE COMPLETED AND CHARTS UPDATED. PT WITH ALL BELONGINGS (BERNIE) AND 
DOCUMENT SIGNED. ENDORSEMENT CALLED TO SANTOS LISA AT [facility]. D/C PACKET PROVIDED TO EMT FOR SNF 
RN. ALL DAY NURSE DUTIES ATTENDED TO AND PT LEFT WITHOUT CONCERN OR COMPLAINT.

## 2018-09-13 NOTE — NUR
PATIENT RECEIVED TRACHED ON MECHANICAL VENTILATION WITH CURRENT AC SETTINGS. VENT PLUGGED 
INTO RED OUTLET. AMBU BAG @ HOB. ALARMS ON AND AUDIBLE. SX DONE, TRACH SECURED AND PATENT AT 
ALL TIMES. SMALL THICK WHITE SECRETIONS NOTED. NO DISTRESS NOTED T/O SHIFT. GILSON/TAWANNA 
REPLACED.

-------------------------------------------------------------------------------

Addendum: 09/13/18 at 0555 by HARVEY CHÁVEZ RT

-------------------------------------------------------------------------------

Amended: Links added.

## 2018-09-13 NOTE — NUR
TELE RN NOTES. PT SCRATCHED R.UA WOUND, AREA CLEANED AND DRESSING REAPPLIED AND WRAPPED FOR 
SAFETY.

## 2018-09-13 NOTE — NUR
RN TELE CLOSING NOTES

PATIENT IN BED AWAKE. NONVERBAL. IN STABLE CONDITION THROUGHOUT SHIFT. BREATHING EVEN AND 
UNLABORED. ON VENT WITH ORDERED SETTINGS. NO S/S OF PAIN. NO FACIAL GRIMACING. NO IV ACCESS 
- MD AWARE. PATIENT'S LEFT CHEST WALL CATH DRESSING INTACT. GTUBE INTACT AND PATENT - 
INFUSING NEPRO AT 45ML/HR - TOLERATING WELL. NO RESIDUAL THROUGHOUT SHIFT. ALL OTHER NEEDS 
ATTENDED TO. CALL LIGHT WITHIN REACH. BED ON LOWEST LOCKED POSITION. WILL ENDORSE TO 
ONCOMING NURSE FOR CONTINUITY OF CARE.

## 2018-09-13 NOTE — NUR
TELE RN OPENING NOTES. PT RECEIVED ALERT TO SELF BUT ABLE TO MAKE NEEDS KNOWN BY POINTING 
AND  MOUTHING WORDS. PT VENT AND TRACH DEPENDENT, SETTINGS REVIEWED AND ALARMS AUDIBLE, 
PLUGGED TO RED POINTS. PT REMAINS WITHOUT IVC. PT WITH G.TUBE INTACT ADN OPERATIONAL AND NAD 
AT SITE. PT BRIEFED ON POC AND IS WITHOUT S/S OF DISTRESS OR DISCOMFORT AT THIS TIME. WOUND 
AND LCS PC DRESSINGS INTACT AT THIS TIME,BED IN LOWEST LOCKED WITH HANDRAILSX4 AND CALL BELL 
WITHIN REACH. WILL CONTINUE POC.

## 2018-09-13 NOTE — NUR
WOUND CARE CONSULT: RECEIVED CONSULT FOR POSSIBLE WOUND UNDER TRACH TIE. NECK ASSESSED AND 
NO WOUND FOUND. WILL SEE PRN.

## 2018-10-09 ENCOUNTER — HOSPITAL ENCOUNTER (INPATIENT)
Dept: HOSPITAL 54 - ER | Age: 58
LOS: 12 days | DRG: 720 | End: 2018-10-21
Attending: INTERNAL MEDICINE | Admitting: INTERNAL MEDICINE
Payer: COMMERCIAL

## 2018-10-09 VITALS — HEIGHT: 63 IN | WEIGHT: 173 LBS | BODY MASS INDEX: 30.65 KG/M2

## 2018-10-09 VITALS — SYSTOLIC BLOOD PRESSURE: 93 MMHG | DIASTOLIC BLOOD PRESSURE: 42 MMHG

## 2018-10-09 VITALS — SYSTOLIC BLOOD PRESSURE: 105 MMHG | DIASTOLIC BLOOD PRESSURE: 52 MMHG

## 2018-10-09 VITALS — DIASTOLIC BLOOD PRESSURE: 51 MMHG | SYSTOLIC BLOOD PRESSURE: 110 MMHG

## 2018-10-09 VITALS — SYSTOLIC BLOOD PRESSURE: 71 MMHG | DIASTOLIC BLOOD PRESSURE: 33 MMHG

## 2018-10-09 VITALS — DIASTOLIC BLOOD PRESSURE: 44 MMHG | SYSTOLIC BLOOD PRESSURE: 73 MMHG

## 2018-10-09 VITALS — SYSTOLIC BLOOD PRESSURE: 120 MMHG | DIASTOLIC BLOOD PRESSURE: 63 MMHG

## 2018-10-09 VITALS — DIASTOLIC BLOOD PRESSURE: 34 MMHG | SYSTOLIC BLOOD PRESSURE: 46 MMHG

## 2018-10-09 VITALS — SYSTOLIC BLOOD PRESSURE: 93 MMHG | DIASTOLIC BLOOD PRESSURE: 60 MMHG

## 2018-10-09 VITALS — SYSTOLIC BLOOD PRESSURE: 104 MMHG | DIASTOLIC BLOOD PRESSURE: 47 MMHG

## 2018-10-09 VITALS — DIASTOLIC BLOOD PRESSURE: 48 MMHG | SYSTOLIC BLOOD PRESSURE: 91 MMHG

## 2018-10-09 VITALS — DIASTOLIC BLOOD PRESSURE: 41 MMHG | SYSTOLIC BLOOD PRESSURE: 71 MMHG

## 2018-10-09 VITALS — DIASTOLIC BLOOD PRESSURE: 57 MMHG | SYSTOLIC BLOOD PRESSURE: 112 MMHG

## 2018-10-09 VITALS — DIASTOLIC BLOOD PRESSURE: 40 MMHG | SYSTOLIC BLOOD PRESSURE: 81 MMHG

## 2018-10-09 VITALS — DIASTOLIC BLOOD PRESSURE: 29 MMHG | SYSTOLIC BLOOD PRESSURE: 65 MMHG

## 2018-10-09 VITALS — SYSTOLIC BLOOD PRESSURE: 81 MMHG | DIASTOLIC BLOOD PRESSURE: 40 MMHG

## 2018-10-09 VITALS — SYSTOLIC BLOOD PRESSURE: 70 MMHG | DIASTOLIC BLOOD PRESSURE: 29 MMHG

## 2018-10-09 VITALS — SYSTOLIC BLOOD PRESSURE: 100 MMHG | DIASTOLIC BLOOD PRESSURE: 63 MMHG

## 2018-10-09 VITALS — DIASTOLIC BLOOD PRESSURE: 37 MMHG | SYSTOLIC BLOOD PRESSURE: 82 MMHG

## 2018-10-09 VITALS — SYSTOLIC BLOOD PRESSURE: 95 MMHG | DIASTOLIC BLOOD PRESSURE: 46 MMHG

## 2018-10-09 VITALS — SYSTOLIC BLOOD PRESSURE: 111 MMHG | DIASTOLIC BLOOD PRESSURE: 66 MMHG

## 2018-10-09 VITALS — DIASTOLIC BLOOD PRESSURE: 62 MMHG | SYSTOLIC BLOOD PRESSURE: 115 MMHG

## 2018-10-09 VITALS — DIASTOLIC BLOOD PRESSURE: 50 MMHG | SYSTOLIC BLOOD PRESSURE: 105 MMHG

## 2018-10-09 VITALS — SYSTOLIC BLOOD PRESSURE: 109 MMHG | DIASTOLIC BLOOD PRESSURE: 50 MMHG

## 2018-10-09 VITALS — DIASTOLIC BLOOD PRESSURE: 66 MMHG | SYSTOLIC BLOOD PRESSURE: 111 MMHG

## 2018-10-09 VITALS — DIASTOLIC BLOOD PRESSURE: 56 MMHG | SYSTOLIC BLOOD PRESSURE: 112 MMHG

## 2018-10-09 VITALS — SYSTOLIC BLOOD PRESSURE: 109 MMHG | DIASTOLIC BLOOD PRESSURE: 43 MMHG

## 2018-10-09 VITALS — DIASTOLIC BLOOD PRESSURE: 49 MMHG | SYSTOLIC BLOOD PRESSURE: 91 MMHG

## 2018-10-09 VITALS — DIASTOLIC BLOOD PRESSURE: 39 MMHG | SYSTOLIC BLOOD PRESSURE: 70 MMHG

## 2018-10-09 VITALS — SYSTOLIC BLOOD PRESSURE: 90 MMHG | DIASTOLIC BLOOD PRESSURE: 47 MMHG

## 2018-10-09 VITALS — SYSTOLIC BLOOD PRESSURE: 86 MMHG | DIASTOLIC BLOOD PRESSURE: 37 MMHG

## 2018-10-09 VITALS — DIASTOLIC BLOOD PRESSURE: 37 MMHG | SYSTOLIC BLOOD PRESSURE: 77 MMHG

## 2018-10-09 VITALS — DIASTOLIC BLOOD PRESSURE: 56 MMHG | SYSTOLIC BLOOD PRESSURE: 97 MMHG

## 2018-10-09 VITALS — DIASTOLIC BLOOD PRESSURE: 44 MMHG | SYSTOLIC BLOOD PRESSURE: 100 MMHG

## 2018-10-09 VITALS — SYSTOLIC BLOOD PRESSURE: 99 MMHG | DIASTOLIC BLOOD PRESSURE: 49 MMHG

## 2018-10-09 VITALS — SYSTOLIC BLOOD PRESSURE: 97 MMHG | DIASTOLIC BLOOD PRESSURE: 43 MMHG

## 2018-10-09 VITALS — SYSTOLIC BLOOD PRESSURE: 112 MMHG | DIASTOLIC BLOOD PRESSURE: 57 MMHG

## 2018-10-09 VITALS — DIASTOLIC BLOOD PRESSURE: 45 MMHG | SYSTOLIC BLOOD PRESSURE: 95 MMHG

## 2018-10-09 VITALS — DIASTOLIC BLOOD PRESSURE: 42 MMHG | SYSTOLIC BLOOD PRESSURE: 75 MMHG

## 2018-10-09 VITALS — DIASTOLIC BLOOD PRESSURE: 47 MMHG | SYSTOLIC BLOOD PRESSURE: 91 MMHG

## 2018-10-09 VITALS — SYSTOLIC BLOOD PRESSURE: 97 MMHG | DIASTOLIC BLOOD PRESSURE: 45 MMHG

## 2018-10-09 VITALS — SYSTOLIC BLOOD PRESSURE: 105 MMHG | DIASTOLIC BLOOD PRESSURE: 50 MMHG

## 2018-10-09 VITALS — SYSTOLIC BLOOD PRESSURE: 86 MMHG | DIASTOLIC BLOOD PRESSURE: 46 MMHG

## 2018-10-09 DIAGNOSIS — L89.320: ICD-10-CM

## 2018-10-09 DIAGNOSIS — B37.7: Primary | ICD-10-CM

## 2018-10-09 DIAGNOSIS — D61.818: ICD-10-CM

## 2018-10-09 DIAGNOSIS — E87.1: ICD-10-CM

## 2018-10-09 DIAGNOSIS — Z99.2: ICD-10-CM

## 2018-10-09 DIAGNOSIS — E87.6: ICD-10-CM

## 2018-10-09 DIAGNOSIS — L89.150: ICD-10-CM

## 2018-10-09 DIAGNOSIS — D63.1: ICD-10-CM

## 2018-10-09 DIAGNOSIS — I63.9: ICD-10-CM

## 2018-10-09 DIAGNOSIS — R65.21: ICD-10-CM

## 2018-10-09 DIAGNOSIS — L98.8: ICD-10-CM

## 2018-10-09 DIAGNOSIS — E87.2: ICD-10-CM

## 2018-10-09 DIAGNOSIS — A04.72: ICD-10-CM

## 2018-10-09 DIAGNOSIS — E11.649: ICD-10-CM

## 2018-10-09 DIAGNOSIS — L03.221: ICD-10-CM

## 2018-10-09 DIAGNOSIS — I50.32: ICD-10-CM

## 2018-10-09 DIAGNOSIS — E11.22: ICD-10-CM

## 2018-10-09 DIAGNOSIS — Z22.322: ICD-10-CM

## 2018-10-09 DIAGNOSIS — R74.0: ICD-10-CM

## 2018-10-09 DIAGNOSIS — Z93.1: ICD-10-CM

## 2018-10-09 DIAGNOSIS — J96.21: ICD-10-CM

## 2018-10-09 DIAGNOSIS — D69.6: ICD-10-CM

## 2018-10-09 DIAGNOSIS — L89.310: ICD-10-CM

## 2018-10-09 DIAGNOSIS — K74.60: ICD-10-CM

## 2018-10-09 DIAGNOSIS — G93.1: ICD-10-CM

## 2018-10-09 DIAGNOSIS — D53.9: ICD-10-CM

## 2018-10-09 DIAGNOSIS — N20.0: ICD-10-CM

## 2018-10-09 DIAGNOSIS — J44.0: ICD-10-CM

## 2018-10-09 DIAGNOSIS — D63.8: ICD-10-CM

## 2018-10-09 DIAGNOSIS — G40.909: ICD-10-CM

## 2018-10-09 DIAGNOSIS — J15.1: ICD-10-CM

## 2018-10-09 DIAGNOSIS — L30.4: ICD-10-CM

## 2018-10-09 DIAGNOSIS — H10.9: ICD-10-CM

## 2018-10-09 DIAGNOSIS — N18.6: ICD-10-CM

## 2018-10-09 DIAGNOSIS — E43: ICD-10-CM

## 2018-10-09 DIAGNOSIS — Z99.11: ICD-10-CM

## 2018-10-09 DIAGNOSIS — L89.119: ICD-10-CM

## 2018-10-09 DIAGNOSIS — G93.6: ICD-10-CM

## 2018-10-09 DIAGNOSIS — R53.2: ICD-10-CM

## 2018-10-09 DIAGNOSIS — R19.5: ICD-10-CM

## 2018-10-09 DIAGNOSIS — J15.6: ICD-10-CM

## 2018-10-09 DIAGNOSIS — E88.09: ICD-10-CM

## 2018-10-09 DIAGNOSIS — I13.2: ICD-10-CM

## 2018-10-09 DIAGNOSIS — K80.20: ICD-10-CM

## 2018-10-09 DIAGNOSIS — I25.10: ICD-10-CM

## 2018-10-09 DIAGNOSIS — R40.3: ICD-10-CM

## 2018-10-09 DIAGNOSIS — R13.10: ICD-10-CM

## 2018-10-09 LAB
ALBUMIN SERPL BCP-MCNC: 2.1 G/DL (ref 3.4–5)
ALP SERPL-CCNC: 771 U/L (ref 46–116)
ALT SERPL W P-5'-P-CCNC: 85 U/L (ref 12–78)
APTT PPP: 40 SEC (ref 23–34)
AST SERPL W P-5'-P-CCNC: 169 U/L (ref 15–37)
BASE EXCESS BLDA CALC-SCNC: -2.1 MMOL/L
BASE EXCESS BLDA CALC-SCNC: -6.3 MMOL/L
BASOPHILS # BLD AUTO: 0 /CMM (ref 0–0.2)
BASOPHILS NFR BLD AUTO: 2.2 % (ref 0–2)
BILIRUB DIRECT SERPL-MCNC: 0.1 MG/DL (ref 0–0.2)
BILIRUB SERPL-MCNC: 0.4 MG/DL (ref 0.2–1)
BUN SERPL-MCNC: 69 MG/DL (ref 7–18)
CALCIUM SERPL-MCNC: 9.9 MG/DL (ref 8.5–10.1)
CHLORIDE SERPL-SCNC: 99 MMOL/L (ref 98–107)
CO2 SERPL-SCNC: 28 MMOL/L (ref 21–32)
CREAT SERPL-MCNC: 3.1 MG/DL (ref 0.6–1.3)
DO-HGB MFR BLDA: 120.9 MMHG
DO-HGB MFR BLDA: 173.6 MMHG
EOSINOPHIL NFR BLD AUTO: 8.9 % (ref 0–6)
EOSINOPHIL NFR BLD MANUAL: 2 % (ref 0–4)
GLUCOSE SERPL-MCNC: 143 MG/DL (ref 74–106)
HCT VFR BLD AUTO: 23 % (ref 39–51)
HGB BLD-MCNC: 7.6 G/DL (ref 13.5–17.5)
INHALED O2 CONCENTRATION: 40 %
INHALED O2 CONCENTRATION: 40 %
INR PPP: 1.1 (ref 0.85–1.15)
LYMPHOCYTES NFR BLD AUTO: 0.4 /CMM (ref 0.8–4.8)
LYMPHOCYTES NFR BLD AUTO: 30 % (ref 20–44)
LYMPHOCYTES NFR BLD MANUAL: 45 % (ref 16–48)
MCHC RBC AUTO-ENTMCNC: 33 G/DL (ref 31–36)
MCV RBC AUTO: 103 FL (ref 80–96)
MONOCYTES NFR BLD AUTO: 0 /CMM (ref 0.1–1.3)
MONOCYTES NFR BLD AUTO: 3.5 % (ref 2–12)
MONOCYTES NFR BLD MANUAL: 5 % (ref 0–11)
NEUTROPHILS # BLD AUTO: 0.8 /CMM (ref 1.8–8.9)
NEUTROPHILS NFR BLD AUTO: 55.4 % (ref 43–81)
NEUTS BAND NFR BLD MANUAL: 1 % (ref 0–5)
NEUTS SEG NFR BLD MANUAL: 47 % (ref 42–76)
PCO2 TEMP ADJ BLDA: 44.8 MMHG (ref 35–45)
PCO2 TEMP ADJ BLDA: 87.6 MMHG (ref 35–45)
PH TEMP ADJ BLDA: 7.07 [PH] (ref 7.35–7.45)
PH TEMP ADJ BLDA: 7.34 [PH] (ref 7.35–7.45)
PLATELET # BLD AUTO: 22 /CMM (ref 150–450)
PO2 TEMP ADJ BLDA: 60.1 MMHG (ref 75–100)
PO2 TEMP ADJ BLDA: 63.6 MMHG (ref 75–100)
POTASSIUM SERPL-SCNC: 3.5 MMOL/L (ref 3.5–5.1)
PROT SERPL-MCNC: 6.6 G/DL (ref 6.4–8.2)
RBC # BLD AUTO: 2.23 MIL/UL (ref 4.5–6)
RDW COEFFICIENT OF VARIATION: 21.8 (ref 11.5–15)
SAO2 % BLDA: 86.5 % (ref 92–98.5)
SAO2 % BLDA: 91.7 % (ref 92–98.5)
SODIUM SERPL-SCNC: 133 MMOL/L (ref 136–145)
TROPONIN I SERPL-MCNC: < 0.017 NG/ML (ref 0–0.06)
VENTILATION MODE VENT: (no result)
VENTILATION MODE VENT: (no result)
WBC NRBC COR # BLD AUTO: 1.3 K/UL (ref 4.3–11)

## 2018-10-09 PROCEDURE — G0378 HOSPITAL OBSERVATION PER HR: HCPCS

## 2018-10-09 PROCEDURE — Z7610: HCPCS

## 2018-10-09 PROCEDURE — B546ZZA ULTRASONOGRAPHY OF RIGHT SUBCLAVIAN VEIN, GUIDANCE: ICD-10-PCS

## 2018-10-09 PROCEDURE — A4606 OXYGEN PROBE USED W OXIMETER: HCPCS

## 2018-10-09 PROCEDURE — 5A1955Z RESPIRATORY VENTILATION, GREATER THAN 96 CONSECUTIVE HOURS: ICD-10-PCS | Performed by: INTERNAL MEDICINE

## 2018-10-09 PROCEDURE — C1751 CATH, INF, PER/CENT/MIDLINE: HCPCS

## 2018-10-09 PROCEDURE — A6253 ABSORPT DRG > 48 SQ IN W/O B: HCPCS

## 2018-10-09 PROCEDURE — A6403 STERILE GAUZE>16 <= 48 SQ IN: HCPCS

## 2018-10-09 PROCEDURE — A7526 TRACHEOSTOMY TUBE COLLAR: HCPCS

## 2018-10-09 PROCEDURE — C9113 INJ PANTOPRAZOLE SODIUM, VIA: HCPCS

## 2018-10-09 PROCEDURE — 05H533Z INSERTION OF INFUSION DEVICE INTO RIGHT SUBCLAVIAN VEIN, PERCUTANEOUS APPROACH: ICD-10-PCS | Performed by: NURSE PRACTITIONER

## 2018-10-09 PROCEDURE — A4216 STERILE WATER/SALINE, 10 ML: HCPCS

## 2018-10-09 PROCEDURE — A6402 STERILE GAUZE <= 16 SQ IN: HCPCS

## 2018-10-09 RX ADMIN — LEVETIRACETAM SCH MG: 250 TABLET, FILM COATED ORAL at 17:21

## 2018-10-09 RX ADMIN — DEXTROSE MONOHYDRATE PRN MLS/HR: 50 INJECTION, SOLUTION INTRAVENOUS at 18:16

## 2018-10-09 RX ADMIN — PIPERACILLIN SODIUM AND TAZOBACTAM SODIUM SCH MLS/HR: .25; 2 INJECTION, POWDER, LYOPHILIZED, FOR SOLUTION INTRAVENOUS at 18:16

## 2018-10-09 NOTE — NUR
RECEIVED PT TRACHED PTX 9 ON VENT. PT TOLERATING VENT SETTINGS. SX'D FOR MOD AMT OF FROTHY 
YELLOW SECRETIONS. VENT ALARMS SET AND AUDIBLE. TRACH SECURE, CUFF MLT. VENT PLUGGED INTO 
RED OUTLET. WILL CONTINUE TO MONITOR.

-------------------------------------------------------------------------------

Addendum: 10/09/18 at 2048 by BENNY CUEVAS RT

-------------------------------------------------------------------------------

Amended: Links added.

## 2018-10-09 NOTE — NUR
ICU RN INITIAL SHIFT NOTES



RECEIVED PATIENT IN BED, AWAKE AT THIS TIME, NONVERBAL, UNABLE TO FOLLOW COMMANDS. BREATHING 
EVEN AND NONLABORED. TRACH MIDLINE AND INTACT, ON MECHANICAL VENTILATOR AT PRESCRIBED 
SETTINGS, TOLERATING WELL, FREE FROM ANY S/S OF RESPIRATORY DISTRESS, SPO2 WNL. ON TELEMETRY 
MONITORING, SHOWING SINUS RHYTHM WITH BBB. CURRENTLY ON LEVOPHED DRIP @ 16MCG VIA LEILANI 
MIDLINE, SITE FREE FROM ANY S/S OF INFILTRATION OR PHLEBITIS. ALSO INFUSING NS @ 75 ML/HR. 
GT PATENT AND INTACT, CLAMPED AT THIS TIME. CORE/RECTAL TEMP 86, AKIRA HUGGER BLANKET 
APPLIED. BED IN LOWEST AND LOCKED POSITION, CALL LIGHT LEFT WITHIN EASY REACH. WILL CONTINUE 
TO CLOSELY MONITOR

## 2018-10-09 NOTE — NUR
ICU RN NOTES



RECEIVED CALL FORM LAB, LACTIC ACID LEVEL = 2.6 DR REYNOSO NOTIFIED. MD WITH ORDER TO CHANGE 
RATE OF IVF FROM 75ML/HR TO 100ML/HR. NEW ORDERS CARRIED OUT WILL CONTINUE TO CLOSELY 
MONITOR

## 2018-10-09 NOTE — NUR
RT

VENT CHANGES DONE  PER MD ORDERS  [id number]. FOLLOW-UP ABG AT 2000 

-------------------------------------------------------------------------------

Addendum: 10/09/18 at 1808 by AUSTIN DESOUZA RT

-------------------------------------------------------------------------------

Amended: Links added.

## 2018-10-09 NOTE — NUR
RT NOTE



PT PLACED ON VENT PER MD ORDER. SETTINGS AS ENDORSED BY TRANSPORT RT AC 16 450 40% +5. PT 
HAS PORTEX 9 CUFFED TRACHEOSTOMY TUBE IN PLACE. TRACH MIDLINE AND SECURE. CUFF INFLATED VIA 
MLT. ALARMS SET PER PROTOCOL AND AUDIBLE. VENT PLUGGED IN TO RED OUTLET. AMBU BAG AT BED 
SIDE. NO DISTRESS NOTED. 

-------------------------------------------------------------------------------

Addendum: 10/09/18 at 1244 by PK VALENZUELA RT

-------------------------------------------------------------------------------

Amended: Links added.

## 2018-10-09 NOTE — NUR
RN NOTES/MID LINE 

PICC LINE RN ABLE TO INSERT MIDLINE ONLY, MD AWARE

-------------------------------------------------------------------------------

Addendum: 10/09/18 at 1821 by SATHYA MAK RN

-------------------------------------------------------------------------------

Amended: Links added.

## 2018-10-09 NOTE — NUR
RN NOTE



1530: Admitted 57y/o male patient, opens eyes, able to follow some commands. With trache to 
vent, tolerated settings at this time. No respiratory distress noted at this time. With GT 
intact. HINA AV shunt, LCW HD cath. With 1 g22 PIV, connected to Dopamine @ 3mcg at this 
time, will titrate as ordered. Skin assessment done, noted with right deltoid wound, right 
axilla redness, bilateral groins redness, sacral redness, perineal redness and bilateral 
heels redness, taken pictures and attached to chart. On Isoflex mattress now. SB 50's on the 
monitor, SBP 70's. Kept HOB flat for now.



1600: PICC nurse tried to place PICC line but not successful, able to place midline by her, 
made CN and MD aware, used midline for now. Noted patient with episodes of resisting to 
care, will continue to monitor.



1615: S/E by Dr. Jones, with order to DC PEEP, made RT aware.



1730: Pharmacy informed Dr. Jones re: shortage of Dopamine, with order to change Dopamine to 
Levophed.

## 2018-10-10 VITALS — DIASTOLIC BLOOD PRESSURE: 48 MMHG | SYSTOLIC BLOOD PRESSURE: 105 MMHG

## 2018-10-10 VITALS — DIASTOLIC BLOOD PRESSURE: 41 MMHG | SYSTOLIC BLOOD PRESSURE: 93 MMHG

## 2018-10-10 VITALS — DIASTOLIC BLOOD PRESSURE: 41 MMHG | SYSTOLIC BLOOD PRESSURE: 91 MMHG

## 2018-10-10 VITALS — DIASTOLIC BLOOD PRESSURE: 42 MMHG | SYSTOLIC BLOOD PRESSURE: 94 MMHG

## 2018-10-10 VITALS — SYSTOLIC BLOOD PRESSURE: 93 MMHG | DIASTOLIC BLOOD PRESSURE: 46 MMHG

## 2018-10-10 VITALS — SYSTOLIC BLOOD PRESSURE: 109 MMHG | DIASTOLIC BLOOD PRESSURE: 48 MMHG

## 2018-10-10 VITALS — SYSTOLIC BLOOD PRESSURE: 100 MMHG | DIASTOLIC BLOOD PRESSURE: 48 MMHG

## 2018-10-10 VITALS — DIASTOLIC BLOOD PRESSURE: 50 MMHG | SYSTOLIC BLOOD PRESSURE: 94 MMHG

## 2018-10-10 VITALS — SYSTOLIC BLOOD PRESSURE: 96 MMHG | DIASTOLIC BLOOD PRESSURE: 60 MMHG

## 2018-10-10 VITALS — DIASTOLIC BLOOD PRESSURE: 52 MMHG | SYSTOLIC BLOOD PRESSURE: 102 MMHG

## 2018-10-10 VITALS — SYSTOLIC BLOOD PRESSURE: 93 MMHG | DIASTOLIC BLOOD PRESSURE: 43 MMHG

## 2018-10-10 VITALS — SYSTOLIC BLOOD PRESSURE: 129 MMHG | DIASTOLIC BLOOD PRESSURE: 69 MMHG

## 2018-10-10 VITALS — SYSTOLIC BLOOD PRESSURE: 98 MMHG | DIASTOLIC BLOOD PRESSURE: 48 MMHG

## 2018-10-10 VITALS — DIASTOLIC BLOOD PRESSURE: 41 MMHG | SYSTOLIC BLOOD PRESSURE: 97 MMHG

## 2018-10-10 VITALS — SYSTOLIC BLOOD PRESSURE: 99 MMHG | DIASTOLIC BLOOD PRESSURE: 51 MMHG

## 2018-10-10 VITALS — DIASTOLIC BLOOD PRESSURE: 41 MMHG | SYSTOLIC BLOOD PRESSURE: 90 MMHG

## 2018-10-10 VITALS — SYSTOLIC BLOOD PRESSURE: 92 MMHG | DIASTOLIC BLOOD PRESSURE: 54 MMHG

## 2018-10-10 VITALS — DIASTOLIC BLOOD PRESSURE: 51 MMHG | SYSTOLIC BLOOD PRESSURE: 105 MMHG

## 2018-10-10 VITALS — DIASTOLIC BLOOD PRESSURE: 56 MMHG | SYSTOLIC BLOOD PRESSURE: 97 MMHG

## 2018-10-10 VITALS — SYSTOLIC BLOOD PRESSURE: 107 MMHG | DIASTOLIC BLOOD PRESSURE: 49 MMHG

## 2018-10-10 VITALS — SYSTOLIC BLOOD PRESSURE: 89 MMHG | DIASTOLIC BLOOD PRESSURE: 47 MMHG

## 2018-10-10 VITALS — DIASTOLIC BLOOD PRESSURE: 55 MMHG | SYSTOLIC BLOOD PRESSURE: 99 MMHG

## 2018-10-10 VITALS — DIASTOLIC BLOOD PRESSURE: 44 MMHG | SYSTOLIC BLOOD PRESSURE: 96 MMHG

## 2018-10-10 VITALS — SYSTOLIC BLOOD PRESSURE: 102 MMHG | DIASTOLIC BLOOD PRESSURE: 44 MMHG

## 2018-10-10 VITALS — SYSTOLIC BLOOD PRESSURE: 96 MMHG | DIASTOLIC BLOOD PRESSURE: 51 MMHG

## 2018-10-10 VITALS — DIASTOLIC BLOOD PRESSURE: 37 MMHG | SYSTOLIC BLOOD PRESSURE: 95 MMHG

## 2018-10-10 VITALS — DIASTOLIC BLOOD PRESSURE: 50 MMHG | SYSTOLIC BLOOD PRESSURE: 89 MMHG

## 2018-10-10 VITALS — DIASTOLIC BLOOD PRESSURE: 57 MMHG | SYSTOLIC BLOOD PRESSURE: 96 MMHG

## 2018-10-10 VITALS — DIASTOLIC BLOOD PRESSURE: 53 MMHG | SYSTOLIC BLOOD PRESSURE: 91 MMHG

## 2018-10-10 VITALS — DIASTOLIC BLOOD PRESSURE: 49 MMHG | SYSTOLIC BLOOD PRESSURE: 104 MMHG

## 2018-10-10 VITALS — SYSTOLIC BLOOD PRESSURE: 93 MMHG | DIASTOLIC BLOOD PRESSURE: 59 MMHG

## 2018-10-10 VITALS — DIASTOLIC BLOOD PRESSURE: 46 MMHG | SYSTOLIC BLOOD PRESSURE: 92 MMHG

## 2018-10-10 VITALS — DIASTOLIC BLOOD PRESSURE: 60 MMHG | SYSTOLIC BLOOD PRESSURE: 102 MMHG

## 2018-10-10 VITALS — SYSTOLIC BLOOD PRESSURE: 96 MMHG | DIASTOLIC BLOOD PRESSURE: 44 MMHG

## 2018-10-10 VITALS — SYSTOLIC BLOOD PRESSURE: 110 MMHG | DIASTOLIC BLOOD PRESSURE: 64 MMHG

## 2018-10-10 VITALS — DIASTOLIC BLOOD PRESSURE: 40 MMHG | SYSTOLIC BLOOD PRESSURE: 90 MMHG

## 2018-10-10 VITALS — DIASTOLIC BLOOD PRESSURE: 50 MMHG | SYSTOLIC BLOOD PRESSURE: 104 MMHG

## 2018-10-10 VITALS — DIASTOLIC BLOOD PRESSURE: 42 MMHG | SYSTOLIC BLOOD PRESSURE: 84 MMHG

## 2018-10-10 VITALS — SYSTOLIC BLOOD PRESSURE: 102 MMHG | DIASTOLIC BLOOD PRESSURE: 52 MMHG

## 2018-10-10 VITALS — SYSTOLIC BLOOD PRESSURE: 88 MMHG | DIASTOLIC BLOOD PRESSURE: 59 MMHG

## 2018-10-10 VITALS — SYSTOLIC BLOOD PRESSURE: 94 MMHG | DIASTOLIC BLOOD PRESSURE: 57 MMHG

## 2018-10-10 VITALS — SYSTOLIC BLOOD PRESSURE: 92 MMHG | DIASTOLIC BLOOD PRESSURE: 44 MMHG

## 2018-10-10 VITALS — DIASTOLIC BLOOD PRESSURE: 55 MMHG | SYSTOLIC BLOOD PRESSURE: 93 MMHG

## 2018-10-10 VITALS — DIASTOLIC BLOOD PRESSURE: 44 MMHG | SYSTOLIC BLOOD PRESSURE: 83 MMHG

## 2018-10-10 VITALS — SYSTOLIC BLOOD PRESSURE: 88 MMHG | DIASTOLIC BLOOD PRESSURE: 53 MMHG

## 2018-10-10 VITALS — DIASTOLIC BLOOD PRESSURE: 42 MMHG | SYSTOLIC BLOOD PRESSURE: 101 MMHG

## 2018-10-10 VITALS — SYSTOLIC BLOOD PRESSURE: 106 MMHG | DIASTOLIC BLOOD PRESSURE: 51 MMHG

## 2018-10-10 VITALS — SYSTOLIC BLOOD PRESSURE: 102 MMHG | DIASTOLIC BLOOD PRESSURE: 49 MMHG

## 2018-10-10 VITALS — SYSTOLIC BLOOD PRESSURE: 83 MMHG | DIASTOLIC BLOOD PRESSURE: 51 MMHG

## 2018-10-10 VITALS — SYSTOLIC BLOOD PRESSURE: 101 MMHG | DIASTOLIC BLOOD PRESSURE: 42 MMHG

## 2018-10-10 VITALS — DIASTOLIC BLOOD PRESSURE: 51 MMHG | SYSTOLIC BLOOD PRESSURE: 106 MMHG

## 2018-10-10 VITALS — SYSTOLIC BLOOD PRESSURE: 104 MMHG | DIASTOLIC BLOOD PRESSURE: 47 MMHG

## 2018-10-10 VITALS — DIASTOLIC BLOOD PRESSURE: 40 MMHG | SYSTOLIC BLOOD PRESSURE: 93 MMHG

## 2018-10-10 VITALS — SYSTOLIC BLOOD PRESSURE: 95 MMHG | DIASTOLIC BLOOD PRESSURE: 47 MMHG

## 2018-10-10 VITALS — DIASTOLIC BLOOD PRESSURE: 38 MMHG | SYSTOLIC BLOOD PRESSURE: 86 MMHG

## 2018-10-10 VITALS — SYSTOLIC BLOOD PRESSURE: 92 MMHG | DIASTOLIC BLOOD PRESSURE: 46 MMHG

## 2018-10-10 VITALS — DIASTOLIC BLOOD PRESSURE: 53 MMHG | SYSTOLIC BLOOD PRESSURE: 106 MMHG

## 2018-10-10 VITALS — SYSTOLIC BLOOD PRESSURE: 94 MMHG | DIASTOLIC BLOOD PRESSURE: 50 MMHG

## 2018-10-10 VITALS — SYSTOLIC BLOOD PRESSURE: 106 MMHG | DIASTOLIC BLOOD PRESSURE: 56 MMHG

## 2018-10-10 VITALS — DIASTOLIC BLOOD PRESSURE: 51 MMHG | SYSTOLIC BLOOD PRESSURE: 113 MMHG

## 2018-10-10 VITALS — DIASTOLIC BLOOD PRESSURE: 50 MMHG | SYSTOLIC BLOOD PRESSURE: 97 MMHG

## 2018-10-10 VITALS — SYSTOLIC BLOOD PRESSURE: 102 MMHG | DIASTOLIC BLOOD PRESSURE: 55 MMHG

## 2018-10-10 VITALS — DIASTOLIC BLOOD PRESSURE: 45 MMHG | SYSTOLIC BLOOD PRESSURE: 98 MMHG

## 2018-10-10 VITALS — DIASTOLIC BLOOD PRESSURE: 41 MMHG | SYSTOLIC BLOOD PRESSURE: 102 MMHG

## 2018-10-10 VITALS — SYSTOLIC BLOOD PRESSURE: 113 MMHG | DIASTOLIC BLOOD PRESSURE: 49 MMHG

## 2018-10-10 VITALS — SYSTOLIC BLOOD PRESSURE: 112 MMHG | DIASTOLIC BLOOD PRESSURE: 62 MMHG

## 2018-10-10 VITALS — DIASTOLIC BLOOD PRESSURE: 45 MMHG | SYSTOLIC BLOOD PRESSURE: 119 MMHG

## 2018-10-10 VITALS — DIASTOLIC BLOOD PRESSURE: 58 MMHG | SYSTOLIC BLOOD PRESSURE: 114 MMHG

## 2018-10-10 VITALS — SYSTOLIC BLOOD PRESSURE: 85 MMHG | DIASTOLIC BLOOD PRESSURE: 41 MMHG

## 2018-10-10 VITALS — SYSTOLIC BLOOD PRESSURE: 75 MMHG | DIASTOLIC BLOOD PRESSURE: 47 MMHG

## 2018-10-10 VITALS — SYSTOLIC BLOOD PRESSURE: 113 MMHG | DIASTOLIC BLOOD PRESSURE: 61 MMHG

## 2018-10-10 VITALS — DIASTOLIC BLOOD PRESSURE: 34 MMHG | SYSTOLIC BLOOD PRESSURE: 87 MMHG

## 2018-10-10 VITALS — DIASTOLIC BLOOD PRESSURE: 38 MMHG | SYSTOLIC BLOOD PRESSURE: 97 MMHG

## 2018-10-10 VITALS — DIASTOLIC BLOOD PRESSURE: 45 MMHG | SYSTOLIC BLOOD PRESSURE: 100 MMHG

## 2018-10-10 VITALS — DIASTOLIC BLOOD PRESSURE: 48 MMHG | SYSTOLIC BLOOD PRESSURE: 109 MMHG

## 2018-10-10 VITALS — SYSTOLIC BLOOD PRESSURE: 85 MMHG | DIASTOLIC BLOOD PRESSURE: 60 MMHG

## 2018-10-10 VITALS — DIASTOLIC BLOOD PRESSURE: 41 MMHG | SYSTOLIC BLOOD PRESSURE: 100 MMHG

## 2018-10-10 VITALS — DIASTOLIC BLOOD PRESSURE: 52 MMHG | SYSTOLIC BLOOD PRESSURE: 80 MMHG

## 2018-10-10 VITALS — DIASTOLIC BLOOD PRESSURE: 53 MMHG | SYSTOLIC BLOOD PRESSURE: 110 MMHG

## 2018-10-10 VITALS — DIASTOLIC BLOOD PRESSURE: 57 MMHG | SYSTOLIC BLOOD PRESSURE: 88 MMHG

## 2018-10-10 VITALS — SYSTOLIC BLOOD PRESSURE: 87 MMHG | DIASTOLIC BLOOD PRESSURE: 50 MMHG

## 2018-10-10 VITALS — DIASTOLIC BLOOD PRESSURE: 52 MMHG | SYSTOLIC BLOOD PRESSURE: 99 MMHG

## 2018-10-10 VITALS — SYSTOLIC BLOOD PRESSURE: 96 MMHG | DIASTOLIC BLOOD PRESSURE: 46 MMHG

## 2018-10-10 VITALS — DIASTOLIC BLOOD PRESSURE: 56 MMHG | SYSTOLIC BLOOD PRESSURE: 102 MMHG

## 2018-10-10 VITALS — DIASTOLIC BLOOD PRESSURE: 53 MMHG | SYSTOLIC BLOOD PRESSURE: 88 MMHG

## 2018-10-10 VITALS — DIASTOLIC BLOOD PRESSURE: 46 MMHG | SYSTOLIC BLOOD PRESSURE: 84 MMHG

## 2018-10-10 VITALS — DIASTOLIC BLOOD PRESSURE: 39 MMHG | SYSTOLIC BLOOD PRESSURE: 100 MMHG

## 2018-10-10 VITALS — DIASTOLIC BLOOD PRESSURE: 49 MMHG | SYSTOLIC BLOOD PRESSURE: 116 MMHG

## 2018-10-10 VITALS — SYSTOLIC BLOOD PRESSURE: 86 MMHG | DIASTOLIC BLOOD PRESSURE: 46 MMHG

## 2018-10-10 VITALS — DIASTOLIC BLOOD PRESSURE: 60 MMHG | SYSTOLIC BLOOD PRESSURE: 92 MMHG

## 2018-10-10 VITALS — DIASTOLIC BLOOD PRESSURE: 43 MMHG | SYSTOLIC BLOOD PRESSURE: 104 MMHG

## 2018-10-10 VITALS — DIASTOLIC BLOOD PRESSURE: 55 MMHG | SYSTOLIC BLOOD PRESSURE: 92 MMHG

## 2018-10-10 VITALS — SYSTOLIC BLOOD PRESSURE: 111 MMHG | DIASTOLIC BLOOD PRESSURE: 53 MMHG

## 2018-10-10 VITALS — DIASTOLIC BLOOD PRESSURE: 60 MMHG | SYSTOLIC BLOOD PRESSURE: 87 MMHG

## 2018-10-10 VITALS — SYSTOLIC BLOOD PRESSURE: 102 MMHG | DIASTOLIC BLOOD PRESSURE: 56 MMHG

## 2018-10-10 LAB
ALBUMIN SERPL BCP-MCNC: 1.9 G/DL (ref 3.4–5)
ALP SERPL-CCNC: 599 U/L (ref 46–116)
ALT SERPL W P-5'-P-CCNC: 81 U/L (ref 12–78)
AST SERPL W P-5'-P-CCNC: 175 U/L (ref 15–37)
BASE EXCESS BLDA CALC-SCNC: -4.3 MMOL/L
BASOPHILS # BLD AUTO: 0 /CMM (ref 0–0.2)
BASOPHILS NFR BLD AUTO: 0.3 % (ref 0–2)
BILIRUB SERPL-MCNC: 0.6 MG/DL (ref 0.2–1)
BUN SERPL-MCNC: 72 MG/DL (ref 7–18)
CALCIUM SERPL-MCNC: 9.4 MG/DL (ref 8.5–10.1)
CHLORIDE SERPL-SCNC: 98 MMOL/L (ref 98–107)
CHOLEST SERPL-MCNC: 62 MG/DL (ref ?–200)
CO2 SERPL-SCNC: 23 MMOL/L (ref 21–32)
CREAT SERPL-MCNC: 3.2 MG/DL (ref 0.6–1.3)
DO-HGB MFR BLDA: 184.3 MMHG
EOSINOPHIL NFR BLD AUTO: 12.9 % (ref 0–6)
EOSINOPHIL NFR BLD MANUAL: 13 % (ref 0–4)
GLUCOSE SERPL-MCNC: 26 MG/DL (ref 74–106)
HCT VFR BLD AUTO: 23 % (ref 39–51)
HDLC SERPL-MCNC: 50 MG/DL (ref 40–60)
HEMOCCULT STL QL: POSITIVE
HGB BLD-MCNC: 7.2 G/DL (ref 13.5–17.5)
INHALED O2 CONCENTRATION: 40 %
INTRINSIC PEEP RESPIRATORY: 0 CM H2O
LDLC SERPL DIRECT ASSAY-MCNC: 17 MG/DL (ref 0–99)
LYMPHOCYTES NFR BLD AUTO: 0.1 /CMM (ref 0.8–4.8)
LYMPHOCYTES NFR BLD AUTO: 6.6 % (ref 20–44)
LYMPHOCYTES NFR BLD MANUAL: 10 % (ref 16–48)
MAGNESIUM SERPL-MCNC: 2.3 MG/DL (ref 1.8–2.4)
MCHC RBC AUTO-ENTMCNC: 32 G/DL (ref 31–36)
MCV RBC AUTO: 103 FL (ref 80–96)
MONOCYTES NFR BLD AUTO: 0.1 /CMM (ref 0.1–1.3)
MONOCYTES NFR BLD AUTO: 7.7 % (ref 2–12)
MONOCYTES NFR BLD MANUAL: 10 % (ref 0–11)
NEUTROPHILS # BLD AUTO: 1.3 /CMM (ref 1.8–8.9)
NEUTROPHILS NFR BLD AUTO: 72.5 % (ref 43–81)
NEUTS BAND NFR BLD MANUAL: 15 % (ref 0–5)
NEUTS SEG NFR BLD MANUAL: 52 % (ref 42–76)
PCO2 TEMP ADJ BLDA: 34.4 MMHG (ref 35–45)
PEEP SETTING VENT: 550 ML
PH TEMP ADJ BLDA: 7.39 [PH] (ref 7.35–7.45)
PHOSPHATE SERPL-MCNC: 3.4 MG/DL (ref 2.5–4.9)
PLATELET # BLD AUTO: 33 /CMM (ref 150–450)
PO2 TEMP ADJ BLDA: 61.3 MMHG (ref 75–100)
POTASSIUM SERPL-SCNC: 2.8 MMOL/L (ref 3.5–5.1)
PROT SERPL-MCNC: 6.4 G/DL (ref 6.4–8.2)
RBC # BLD AUTO: 2.22 MIL/UL (ref 4.5–6)
RDW COEFFICIENT OF VARIATION: 23.6 (ref 11.5–15)
SAO2 % BLDA: 91 % (ref 92–98.5)
SET RATE, BG: 24
SODIUM SERPL-SCNC: 132 MMOL/L (ref 136–145)
TRIGL SERPL-MCNC: 28 MG/DL (ref 30–150)
TSH SERPL DL<=0.005 MIU/L-ACNC: 10.93 UIU/ML (ref 0.36–3.74)
WBC NRBC COR # BLD AUTO: 1.7 K/UL (ref 4.3–11)

## 2018-10-10 RX ADMIN — POTASSIUM CHLORIDE SCH MLS/HR: 200 INJECTION, SOLUTION INTRAVENOUS at 05:40

## 2018-10-10 RX ADMIN — PIPERACILLIN SODIUM AND TAZOBACTAM SODIUM SCH MLS/HR: .25; 2 INJECTION, POWDER, LYOPHILIZED, FOR SOLUTION INTRAVENOUS at 05:38

## 2018-10-10 RX ADMIN — POTASSIUM CHLORIDE SCH MLS/HR: 200 INJECTION, SOLUTION INTRAVENOUS at 09:16

## 2018-10-10 RX ADMIN — DEXTROSE MONOHYDRATE PRN ML: 500 INJECTION PARENTERAL at 05:00

## 2018-10-10 RX ADMIN — Medication SCH EACH: at 05:38

## 2018-10-10 RX ADMIN — LEVETIRACETAM SCH MG: 250 TABLET, FILM COATED ORAL at 17:29

## 2018-10-10 RX ADMIN — NYSTATIN SCH APPLIC: 100000 CREAM TOPICAL at 15:51

## 2018-10-10 RX ADMIN — DEXTROSE AND SODIUM CHLORIDE PRN MLS/HR: 5; 900 INJECTION, SOLUTION INTRAVENOUS at 17:41

## 2018-10-10 RX ADMIN — DEXTROSE MONOHYDRATE PRN ML: 500 INJECTION PARENTERAL at 11:44

## 2018-10-10 RX ADMIN — Medication SCH EACH: at 17:29

## 2018-10-10 RX ADMIN — PIPERACILLIN SODIUM AND TAZOBACTAM SODIUM SCH MLS/HR: .25; 2 INJECTION, POWDER, LYOPHILIZED, FOR SOLUTION INTRAVENOUS at 17:30

## 2018-10-10 RX ADMIN — POTASSIUM CHLORIDE SCH MEQ: 1.5 POWDER, FOR SOLUTION ORAL at 11:13

## 2018-10-10 RX ADMIN — PIPERACILLIN SODIUM AND TAZOBACTAM SODIUM SCH MLS/HR: .25; 2 INJECTION, POWDER, LYOPHILIZED, FOR SOLUTION INTRAVENOUS at 11:51

## 2018-10-10 RX ADMIN — TOBRAMYCIN AND DEXAMETHASONE SCH DROP: 3; 1 SUSPENSION/ DROPS OPHTHALMIC at 17:30

## 2018-10-10 RX ADMIN — DEXTROSE AND SODIUM CHLORIDE PRN MLS/HR: 5; 900 INJECTION, SOLUTION INTRAVENOUS at 06:00

## 2018-10-10 RX ADMIN — DEXTROSE MONOHYDRATE PRN MLS/HR: 50 INJECTION, SOLUTION INTRAVENOUS at 15:53

## 2018-10-10 RX ADMIN — Medication SCH EACH: at 11:51

## 2018-10-10 RX ADMIN — VANCOMYCIN HYDROCHLORIDE SCH MG: 125 CAPSULE ORAL at 18:50

## 2018-10-10 RX ADMIN — PIPERACILLIN SODIUM AND TAZOBACTAM SODIUM SCH MLS/HR: .25; 2 INJECTION, POWDER, LYOPHILIZED, FOR SOLUTION INTRAVENOUS at 00:05

## 2018-10-10 RX ADMIN — DEXTROSE MONOHYDRATE PRN MLS/HR: 50 INJECTION, SOLUTION INTRAVENOUS at 07:01

## 2018-10-10 RX ADMIN — LEVETIRACETAM SCH MG: 250 TABLET, FILM COATED ORAL at 08:26

## 2018-10-10 RX ADMIN — POTASSIUM CHLORIDE SCH MLS/HR: 200 INJECTION, SOLUTION INTRAVENOUS at 08:01

## 2018-10-10 RX ADMIN — POTASSIUM CHLORIDE SCH MEQ: 1.5 POWDER, FOR SOLUTION ORAL at 09:59

## 2018-10-10 RX ADMIN — Medication SCH EACH: at 17:30

## 2018-10-10 RX ADMIN — POTASSIUM CHLORIDE SCH MLS/HR: 200 INJECTION, SOLUTION INTRAVENOUS at 06:47

## 2018-10-10 NOTE — NUR
RN CLOSING NOTES 

PATIENT IN BED, NONVERBAL UNABLE TO FOLLOW DIRECTIONS, RESPIRATIONS UNLABORED, TOLERATING 
CURRENT VENT SETTINGS.CONTINUED ON LEVOPHED @30MCG/MIN LEILANI PICC MIDLINE, PATENT AND INTACT. 
PT WITH HINA ARM AV SHUNT NON FUNCTIONAL AND L CHEST PERMACATH. WILL CONTINUE TO MONITOR AND 
ENDORSE TO NEXT SHIFT FOR CONTINUITY OF CARE

## 2018-10-10 NOTE — NUR
RECEIVED PT TRACHED PORTEX 9 ON VENT WITH NOTED SETTINGS OF AC 24,550,60%, PEEP 0. PT 
TOLERATING VENT SETTINGS. SUCTIONED MOD AMT OF YELLOW/TAN  SECRETIONS. VENT ALARMS SET AND 
AUDIBLE. TRACH PATENT AND SECURE, CUFF MLT. VENT PLUGGED INTO RED OUTLET. AMBU BAG AT 
BEDSIDE. WILL CONTINUE TO MONITOR.

## 2018-10-10 NOTE — NUR
RN NOTES/FIO2 CHANGE

FIO2 NOW AT 80% AS ORDERED, RT CHANGED SETTING, WILL CONTINUE TO MONITOR

-------------------------------------------------------------------------------

Addendum: 10/10/18 at 0807 by SATHYA MAK RN

-------------------------------------------------------------------------------

RN NOTES INCORRECT ENTRY

## 2018-10-10 NOTE — NUR
RT

PATIENT REC'D TRACHED ON [facility] VENT WITH ORDERED SETTINGS IN CRITICAL CONDITION. VENT ALARMS 
CHECKED + AUDIBLE. CUFF PRESSURE CHECKED MLT. PATIENT AIRWAY SUCTIONED AND PATENT. MOD AMT 
PETERSEN SEMITHICK SECRETIONS. B/S DIM COARSE. PATIENT NON RESPONSIVE. AMBU BAG AT [facility]. 

-------------------------------------------------------------------------------

Addendum: 10/10/18 at 1503 by RIKKI HAWTHORNE RT

-------------------------------------------------------------------------------

Amended: Links added.

## 2018-10-10 NOTE — NUR
ICU RN NOTES



3340 RECEIVED CALL FROM LAB, SPOKE TO RAINER REGARDING CRITICAL LAB VALUES, K 2.6, GLUCOSE 33. 
REQUESTED FOR STAT REDRAW. AFTER BLOOD REDRAWN, POC GLUCOSE READING 14. 1 AMP D50 
ADMINISTERED. DR REYNOSO PAGED. 15 MINUTES AFTER D50, POC GLUCOSE LEVEL = 61. 

0530 RECEIVED CALL BACK FROM DR REYNOSO. MD MADE AWARE OF LOW BLOOD SUGAR AND CRITICAL LAB 
VALUES. PER MD, CHANGE IVF OF NS @ 100ML/HR TO D5NS @ 100 ML/HR. REDRAW POTASSIUM LEVEL 2.8. 
PER MD, REPLACE WITH 40 MEQ KCL IV. ALL NEW ORDERS READ BACK FOR CLARIFICATION. WILL CARRY 
OUT ALL NEW ORDERS AND CONTINUE TO CLOSELY MONITOR THE PATIENT

## 2018-10-10 NOTE — NUR
RN OPENING NOTES 

PATIENT IN BED, NONVERBAL UNABLE TO FOLLOW DIRECTIONS, RESPIRATIONS UNLABORED, TOLERATING 
CURRENT VENT SETTINGS.CONTINUED ON LEVOPHED @28MCG/MIN LEILANI PICC MIDLINE, PATENT AND INTACT. 
PT WITH HINA ARM AV SHUNT AND L CHEST PERMACATH. WILL CONTINUE TO MONITOR

## 2018-10-10 NOTE — NUR
RN NOTES



RECEIVED PATIENT AWAKE, NONVERBAL, UNABLE TO FOLLOW COMMANDS. NO ACUTE RESPIRATORY DISTRESS 
WITH TRACH PORTEX 9 CONNECTED TO VENT SETTING AC 24  FIO2  60% NO PEEP TOLERATING 
WELL, SATURATION 100% TELEME MONITOR REVEALS AS  SINUS RHYTHM WITH BBB.  ON LEVOPHED DRIP @ 
32 MCG/ MIN ON LEILANI MIDLINE, AND   D5NS @ 50 ML/HR. GTF  NEPHRO AT 30 ML/HR TOELRATED WELL 
PATENCY CHECKED WITH ZERO RESIDUAL. CORE TEMP 97.3 .REPOSITIONED AS PT COMFORTABLE AND FOR 
SKIN MANAGEMENT, WILL CLOSELY MONITOR.

## 2018-10-10 NOTE — NUR
RN NOTES/ACCUCHECK

BS RECHECKED NOTED AT 76, WILL START TUBE FEEDING, CALLED [facility] FOR PUMP, WILL CONTINUE TO 
MONITOR

## 2018-10-11 VITALS — SYSTOLIC BLOOD PRESSURE: 102 MMHG | DIASTOLIC BLOOD PRESSURE: 40 MMHG

## 2018-10-11 VITALS — SYSTOLIC BLOOD PRESSURE: 102 MMHG | DIASTOLIC BLOOD PRESSURE: 50 MMHG

## 2018-10-11 VITALS — SYSTOLIC BLOOD PRESSURE: 97 MMHG | DIASTOLIC BLOOD PRESSURE: 46 MMHG

## 2018-10-11 VITALS — SYSTOLIC BLOOD PRESSURE: 112 MMHG | DIASTOLIC BLOOD PRESSURE: 50 MMHG

## 2018-10-11 VITALS — DIASTOLIC BLOOD PRESSURE: 41 MMHG | SYSTOLIC BLOOD PRESSURE: 89 MMHG

## 2018-10-11 VITALS — DIASTOLIC BLOOD PRESSURE: 54 MMHG | SYSTOLIC BLOOD PRESSURE: 103 MMHG

## 2018-10-11 VITALS — DIASTOLIC BLOOD PRESSURE: 52 MMHG | SYSTOLIC BLOOD PRESSURE: 111 MMHG

## 2018-10-11 VITALS — DIASTOLIC BLOOD PRESSURE: 40 MMHG | SYSTOLIC BLOOD PRESSURE: 70 MMHG

## 2018-10-11 VITALS — DIASTOLIC BLOOD PRESSURE: 51 MMHG | SYSTOLIC BLOOD PRESSURE: 112 MMHG

## 2018-10-11 VITALS — SYSTOLIC BLOOD PRESSURE: 90 MMHG | DIASTOLIC BLOOD PRESSURE: 38 MMHG

## 2018-10-11 VITALS — DIASTOLIC BLOOD PRESSURE: 43 MMHG | SYSTOLIC BLOOD PRESSURE: 95 MMHG

## 2018-10-11 VITALS — SYSTOLIC BLOOD PRESSURE: 104 MMHG | DIASTOLIC BLOOD PRESSURE: 48 MMHG

## 2018-10-11 VITALS — SYSTOLIC BLOOD PRESSURE: 130 MMHG | DIASTOLIC BLOOD PRESSURE: 40 MMHG

## 2018-10-11 VITALS — SYSTOLIC BLOOD PRESSURE: 100 MMHG | DIASTOLIC BLOOD PRESSURE: 54 MMHG

## 2018-10-11 VITALS — SYSTOLIC BLOOD PRESSURE: 111 MMHG | DIASTOLIC BLOOD PRESSURE: 56 MMHG

## 2018-10-11 VITALS — DIASTOLIC BLOOD PRESSURE: 53 MMHG | SYSTOLIC BLOOD PRESSURE: 71 MMHG

## 2018-10-11 VITALS — SYSTOLIC BLOOD PRESSURE: 84 MMHG | DIASTOLIC BLOOD PRESSURE: 42 MMHG

## 2018-10-11 VITALS — DIASTOLIC BLOOD PRESSURE: 46 MMHG | SYSTOLIC BLOOD PRESSURE: 94 MMHG

## 2018-10-11 VITALS — SYSTOLIC BLOOD PRESSURE: 100 MMHG | DIASTOLIC BLOOD PRESSURE: 61 MMHG

## 2018-10-11 VITALS — DIASTOLIC BLOOD PRESSURE: 45 MMHG | SYSTOLIC BLOOD PRESSURE: 112 MMHG

## 2018-10-11 VITALS — SYSTOLIC BLOOD PRESSURE: 101 MMHG | DIASTOLIC BLOOD PRESSURE: 57 MMHG

## 2018-10-11 VITALS — SYSTOLIC BLOOD PRESSURE: 112 MMHG | DIASTOLIC BLOOD PRESSURE: 51 MMHG

## 2018-10-11 VITALS — SYSTOLIC BLOOD PRESSURE: 101 MMHG | DIASTOLIC BLOOD PRESSURE: 58 MMHG

## 2018-10-11 VITALS — SYSTOLIC BLOOD PRESSURE: 120 MMHG | DIASTOLIC BLOOD PRESSURE: 49 MMHG

## 2018-10-11 VITALS — DIASTOLIC BLOOD PRESSURE: 51 MMHG | SYSTOLIC BLOOD PRESSURE: 102 MMHG

## 2018-10-11 VITALS — SYSTOLIC BLOOD PRESSURE: 90 MMHG | DIASTOLIC BLOOD PRESSURE: 36 MMHG

## 2018-10-11 VITALS — SYSTOLIC BLOOD PRESSURE: 102 MMHG | DIASTOLIC BLOOD PRESSURE: 61 MMHG

## 2018-10-11 VITALS — DIASTOLIC BLOOD PRESSURE: 46 MMHG | SYSTOLIC BLOOD PRESSURE: 107 MMHG

## 2018-10-11 VITALS — SYSTOLIC BLOOD PRESSURE: 95 MMHG | DIASTOLIC BLOOD PRESSURE: 47 MMHG

## 2018-10-11 VITALS — DIASTOLIC BLOOD PRESSURE: 51 MMHG | SYSTOLIC BLOOD PRESSURE: 95 MMHG

## 2018-10-11 VITALS — DIASTOLIC BLOOD PRESSURE: 53 MMHG | SYSTOLIC BLOOD PRESSURE: 102 MMHG

## 2018-10-11 VITALS — SYSTOLIC BLOOD PRESSURE: 91 MMHG | DIASTOLIC BLOOD PRESSURE: 47 MMHG

## 2018-10-11 VITALS — DIASTOLIC BLOOD PRESSURE: 48 MMHG | SYSTOLIC BLOOD PRESSURE: 93 MMHG

## 2018-10-11 VITALS — SYSTOLIC BLOOD PRESSURE: 98 MMHG | DIASTOLIC BLOOD PRESSURE: 51 MMHG

## 2018-10-11 VITALS — DIASTOLIC BLOOD PRESSURE: 54 MMHG | SYSTOLIC BLOOD PRESSURE: 110 MMHG

## 2018-10-11 VITALS — DIASTOLIC BLOOD PRESSURE: 51 MMHG | SYSTOLIC BLOOD PRESSURE: 86 MMHG

## 2018-10-11 VITALS — SYSTOLIC BLOOD PRESSURE: 88 MMHG | DIASTOLIC BLOOD PRESSURE: 49 MMHG

## 2018-10-11 VITALS — DIASTOLIC BLOOD PRESSURE: 40 MMHG | SYSTOLIC BLOOD PRESSURE: 83 MMHG

## 2018-10-11 VITALS — DIASTOLIC BLOOD PRESSURE: 51 MMHG | SYSTOLIC BLOOD PRESSURE: 105 MMHG

## 2018-10-11 VITALS — SYSTOLIC BLOOD PRESSURE: 111 MMHG | DIASTOLIC BLOOD PRESSURE: 59 MMHG

## 2018-10-11 VITALS — SYSTOLIC BLOOD PRESSURE: 95 MMHG | DIASTOLIC BLOOD PRESSURE: 41 MMHG

## 2018-10-11 VITALS — DIASTOLIC BLOOD PRESSURE: 51 MMHG | SYSTOLIC BLOOD PRESSURE: 78 MMHG

## 2018-10-11 VITALS — DIASTOLIC BLOOD PRESSURE: 48 MMHG | SYSTOLIC BLOOD PRESSURE: 87 MMHG

## 2018-10-11 VITALS — SYSTOLIC BLOOD PRESSURE: 91 MMHG | DIASTOLIC BLOOD PRESSURE: 50 MMHG

## 2018-10-11 VITALS — SYSTOLIC BLOOD PRESSURE: 113 MMHG | DIASTOLIC BLOOD PRESSURE: 52 MMHG

## 2018-10-11 VITALS — DIASTOLIC BLOOD PRESSURE: 56 MMHG | SYSTOLIC BLOOD PRESSURE: 117 MMHG

## 2018-10-11 VITALS — DIASTOLIC BLOOD PRESSURE: 49 MMHG | SYSTOLIC BLOOD PRESSURE: 93 MMHG

## 2018-10-11 VITALS — DIASTOLIC BLOOD PRESSURE: 52 MMHG | SYSTOLIC BLOOD PRESSURE: 118 MMHG

## 2018-10-11 VITALS — SYSTOLIC BLOOD PRESSURE: 101 MMHG | DIASTOLIC BLOOD PRESSURE: 48 MMHG

## 2018-10-11 VITALS — DIASTOLIC BLOOD PRESSURE: 43 MMHG | SYSTOLIC BLOOD PRESSURE: 89 MMHG

## 2018-10-11 VITALS — SYSTOLIC BLOOD PRESSURE: 88 MMHG | DIASTOLIC BLOOD PRESSURE: 46 MMHG

## 2018-10-11 VITALS — SYSTOLIC BLOOD PRESSURE: 105 MMHG | DIASTOLIC BLOOD PRESSURE: 41 MMHG

## 2018-10-11 VITALS — SYSTOLIC BLOOD PRESSURE: 106 MMHG | DIASTOLIC BLOOD PRESSURE: 51 MMHG

## 2018-10-11 VITALS — DIASTOLIC BLOOD PRESSURE: 51 MMHG | SYSTOLIC BLOOD PRESSURE: 104 MMHG

## 2018-10-11 VITALS — SYSTOLIC BLOOD PRESSURE: 95 MMHG | DIASTOLIC BLOOD PRESSURE: 46 MMHG

## 2018-10-11 VITALS — DIASTOLIC BLOOD PRESSURE: 87 MMHG | SYSTOLIC BLOOD PRESSURE: 105 MMHG

## 2018-10-11 VITALS — SYSTOLIC BLOOD PRESSURE: 84 MMHG | DIASTOLIC BLOOD PRESSURE: 43 MMHG

## 2018-10-11 VITALS — DIASTOLIC BLOOD PRESSURE: 45 MMHG | SYSTOLIC BLOOD PRESSURE: 72 MMHG

## 2018-10-11 VITALS — DIASTOLIC BLOOD PRESSURE: 44 MMHG | SYSTOLIC BLOOD PRESSURE: 65 MMHG

## 2018-10-11 VITALS — SYSTOLIC BLOOD PRESSURE: 98 MMHG | DIASTOLIC BLOOD PRESSURE: 48 MMHG

## 2018-10-11 VITALS — DIASTOLIC BLOOD PRESSURE: 57 MMHG | SYSTOLIC BLOOD PRESSURE: 88 MMHG

## 2018-10-11 VITALS — SYSTOLIC BLOOD PRESSURE: 105 MMHG | DIASTOLIC BLOOD PRESSURE: 57 MMHG

## 2018-10-11 VITALS — DIASTOLIC BLOOD PRESSURE: 47 MMHG | SYSTOLIC BLOOD PRESSURE: 98 MMHG

## 2018-10-11 VITALS — DIASTOLIC BLOOD PRESSURE: 49 MMHG | SYSTOLIC BLOOD PRESSURE: 91 MMHG

## 2018-10-11 VITALS — DIASTOLIC BLOOD PRESSURE: 47 MMHG | SYSTOLIC BLOOD PRESSURE: 105 MMHG

## 2018-10-11 VITALS — DIASTOLIC BLOOD PRESSURE: 36 MMHG | SYSTOLIC BLOOD PRESSURE: 96 MMHG

## 2018-10-11 VITALS — DIASTOLIC BLOOD PRESSURE: 50 MMHG | SYSTOLIC BLOOD PRESSURE: 107 MMHG

## 2018-10-11 VITALS — SYSTOLIC BLOOD PRESSURE: 99 MMHG | DIASTOLIC BLOOD PRESSURE: 67 MMHG

## 2018-10-11 VITALS — DIASTOLIC BLOOD PRESSURE: 68 MMHG | SYSTOLIC BLOOD PRESSURE: 108 MMHG

## 2018-10-11 VITALS — DIASTOLIC BLOOD PRESSURE: 56 MMHG | SYSTOLIC BLOOD PRESSURE: 80 MMHG

## 2018-10-11 VITALS — SYSTOLIC BLOOD PRESSURE: 93 MMHG | DIASTOLIC BLOOD PRESSURE: 45 MMHG

## 2018-10-11 VITALS — SYSTOLIC BLOOD PRESSURE: 104 MMHG | DIASTOLIC BLOOD PRESSURE: 51 MMHG

## 2018-10-11 VITALS — SYSTOLIC BLOOD PRESSURE: 121 MMHG | DIASTOLIC BLOOD PRESSURE: 52 MMHG

## 2018-10-11 VITALS — DIASTOLIC BLOOD PRESSURE: 71 MMHG | SYSTOLIC BLOOD PRESSURE: 88 MMHG

## 2018-10-11 VITALS — DIASTOLIC BLOOD PRESSURE: 41 MMHG | SYSTOLIC BLOOD PRESSURE: 91 MMHG

## 2018-10-11 VITALS — SYSTOLIC BLOOD PRESSURE: 86 MMHG | DIASTOLIC BLOOD PRESSURE: 51 MMHG

## 2018-10-11 VITALS — SYSTOLIC BLOOD PRESSURE: 113 MMHG | DIASTOLIC BLOOD PRESSURE: 54 MMHG

## 2018-10-11 VITALS — SYSTOLIC BLOOD PRESSURE: 89 MMHG | DIASTOLIC BLOOD PRESSURE: 47 MMHG

## 2018-10-11 VITALS — SYSTOLIC BLOOD PRESSURE: 97 MMHG | DIASTOLIC BLOOD PRESSURE: 49 MMHG

## 2018-10-11 VITALS — DIASTOLIC BLOOD PRESSURE: 55 MMHG | SYSTOLIC BLOOD PRESSURE: 114 MMHG

## 2018-10-11 VITALS — DIASTOLIC BLOOD PRESSURE: 40 MMHG | SYSTOLIC BLOOD PRESSURE: 102 MMHG

## 2018-10-11 VITALS — SYSTOLIC BLOOD PRESSURE: 103 MMHG | DIASTOLIC BLOOD PRESSURE: 54 MMHG

## 2018-10-11 VITALS — SYSTOLIC BLOOD PRESSURE: 82 MMHG | DIASTOLIC BLOOD PRESSURE: 49 MMHG

## 2018-10-11 VITALS — DIASTOLIC BLOOD PRESSURE: 43 MMHG | SYSTOLIC BLOOD PRESSURE: 103 MMHG

## 2018-10-11 VITALS — SYSTOLIC BLOOD PRESSURE: 54 MMHG | DIASTOLIC BLOOD PRESSURE: 18 MMHG

## 2018-10-11 VITALS — DIASTOLIC BLOOD PRESSURE: 46 MMHG | SYSTOLIC BLOOD PRESSURE: 97 MMHG

## 2018-10-11 VITALS — SYSTOLIC BLOOD PRESSURE: 78 MMHG | DIASTOLIC BLOOD PRESSURE: 43 MMHG

## 2018-10-11 VITALS — DIASTOLIC BLOOD PRESSURE: 50 MMHG | SYSTOLIC BLOOD PRESSURE: 94 MMHG

## 2018-10-11 VITALS — DIASTOLIC BLOOD PRESSURE: 67 MMHG | SYSTOLIC BLOOD PRESSURE: 104 MMHG

## 2018-10-11 VITALS — DIASTOLIC BLOOD PRESSURE: 22 MMHG | SYSTOLIC BLOOD PRESSURE: 56 MMHG

## 2018-10-11 VITALS — DIASTOLIC BLOOD PRESSURE: 57 MMHG | SYSTOLIC BLOOD PRESSURE: 105 MMHG

## 2018-10-11 VITALS — SYSTOLIC BLOOD PRESSURE: 119 MMHG | DIASTOLIC BLOOD PRESSURE: 49 MMHG

## 2018-10-11 VITALS — SYSTOLIC BLOOD PRESSURE: 105 MMHG | DIASTOLIC BLOOD PRESSURE: 45 MMHG

## 2018-10-11 VITALS — SYSTOLIC BLOOD PRESSURE: 95 MMHG | DIASTOLIC BLOOD PRESSURE: 37 MMHG

## 2018-10-11 VITALS — DIASTOLIC BLOOD PRESSURE: 54 MMHG | SYSTOLIC BLOOD PRESSURE: 115 MMHG

## 2018-10-11 VITALS — DIASTOLIC BLOOD PRESSURE: 43 MMHG | SYSTOLIC BLOOD PRESSURE: 101 MMHG

## 2018-10-11 VITALS — SYSTOLIC BLOOD PRESSURE: 91 MMHG | DIASTOLIC BLOOD PRESSURE: 55 MMHG

## 2018-10-11 VITALS — SYSTOLIC BLOOD PRESSURE: 107 MMHG | DIASTOLIC BLOOD PRESSURE: 50 MMHG

## 2018-10-11 VITALS — DIASTOLIC BLOOD PRESSURE: 54 MMHG | SYSTOLIC BLOOD PRESSURE: 116 MMHG

## 2018-10-11 VITALS — DIASTOLIC BLOOD PRESSURE: 62 MMHG | SYSTOLIC BLOOD PRESSURE: 111 MMHG

## 2018-10-11 LAB
ALBUMIN SERPL BCP-MCNC: 1.6 G/DL (ref 3.4–5)
ALP SERPL-CCNC: 593 U/L (ref 46–116)
ALT SERPL W P-5'-P-CCNC: 69 U/L (ref 12–78)
AST SERPL W P-5'-P-CCNC: 164 U/L (ref 15–37)
BASOPHILS # BLD AUTO: 0 /CMM (ref 0–0.2)
BASOPHILS # BLD AUTO: 0 /CMM (ref 0–0.2)
BASOPHILS NFR BLD AUTO: 0 % (ref 0–2)
BASOPHILS NFR BLD AUTO: 0 % (ref 0–2)
BILIRUB SERPL-MCNC: 0.5 MG/DL (ref 0.2–1)
BUN SERPL-MCNC: 88 MG/DL (ref 7–18)
CALCIUM SERPL-MCNC: 9.1 MG/DL (ref 8.5–10.1)
CHLORIDE SERPL-SCNC: 95 MMOL/L (ref 98–107)
CO2 SERPL-SCNC: 20 MMOL/L (ref 21–32)
CREAT SERPL-MCNC: 3.4 MG/DL (ref 0.6–1.3)
EOSINOPHIL NFR BLD AUTO: 2.7 % (ref 0–6)
EOSINOPHIL NFR BLD AUTO: 2.9 % (ref 0–6)
EOSINOPHIL NFR BLD MANUAL: 2 % (ref 0–4)
GLUCOSE SERPL-MCNC: 68 MG/DL (ref 74–106)
HCT VFR BLD AUTO: 22 % (ref 39–51)
HCT VFR BLD AUTO: 23 % (ref 39–51)
HGB BLD-MCNC: 7.2 G/DL (ref 13.5–17.5)
HGB BLD-MCNC: 7.2 G/DL (ref 13.5–17.5)
LYMPHOCYTES NFR BLD AUTO: 1 /CMM (ref 0.8–4.8)
LYMPHOCYTES NFR BLD AUTO: 1.1 /CMM (ref 0.8–4.8)
LYMPHOCYTES NFR BLD AUTO: 7.2 % (ref 20–44)
LYMPHOCYTES NFR BLD AUTO: 7.4 % (ref 20–44)
LYMPHOCYTES NFR BLD MANUAL: 10 % (ref 16–48)
MAGNESIUM SERPL-MCNC: 2.2 MG/DL (ref 1.8–2.4)
MCHC RBC AUTO-ENTMCNC: 32 G/DL (ref 31–36)
MCHC RBC AUTO-ENTMCNC: 32 G/DL (ref 31–36)
MCV RBC AUTO: 103 FL (ref 80–96)
MCV RBC AUTO: 104 FL (ref 80–96)
METAMYELOCYTES NFR BLD MANUAL: 4 % (ref 0–0)
MONOCYTES NFR BLD AUTO: 0.6 /CMM (ref 0.1–1.3)
MONOCYTES NFR BLD AUTO: 1 /CMM (ref 0.1–1.3)
MONOCYTES NFR BLD AUTO: 4.2 % (ref 2–12)
MONOCYTES NFR BLD AUTO: 6.9 % (ref 2–12)
MONOCYTES NFR BLD MANUAL: 6 % (ref 0–11)
MYELOCYTES NFR BLD MANUAL: 2 % (ref 0–0)
NEUTROPHILS # BLD AUTO: 11.9 /CMM (ref 1.8–8.9)
NEUTROPHILS # BLD AUTO: 13.2 /CMM (ref 1.8–8.9)
NEUTROPHILS NFR BLD AUTO: 83 % (ref 43–81)
NEUTROPHILS NFR BLD AUTO: 85.7 % (ref 43–81)
NEUTS BAND NFR BLD MANUAL: 26 % (ref 0–5)
NEUTS SEG NFR BLD MANUAL: 50 % (ref 42–76)
PHOSPHATE SERPL-MCNC: 4 MG/DL (ref 2.5–4.9)
PLATELET # BLD AUTO: 44 /CMM (ref 150–450)
PLATELET # BLD AUTO: 46 /CMM (ref 150–450)
POTASSIUM SERPL-SCNC: 4.7 MMOL/L (ref 3.5–5.1)
PROT SERPL-MCNC: 6.1 G/DL (ref 6.4–8.2)
RBC # BLD AUTO: 2.14 MIL/UL (ref 4.5–6)
RBC # BLD AUTO: 2.2 MIL/UL (ref 4.5–6)
RDW COEFFICIENT OF VARIATION: 23.5 (ref 11.5–15)
RDW COEFFICIENT OF VARIATION: 23.6 (ref 11.5–15)
SODIUM SERPL-SCNC: 126 MMOL/L (ref 136–145)
TROPONIN I SERPL-MCNC: 0.04 NG/ML (ref 0–0.06)
WBC NRBC COR # BLD AUTO: 14.3 K/UL (ref 4.3–11)
WBC NRBC COR # BLD AUTO: 15.4 K/UL (ref 4.3–11)

## 2018-10-11 PROCEDURE — 5A1D70Z PERFORMANCE OF URINARY FILTRATION, INTERMITTENT, LESS THAN 6 HOURS PER DAY: ICD-10-PCS | Performed by: INTERNAL MEDICINE

## 2018-10-11 RX ADMIN — Medication SCH EACH: at 00:21

## 2018-10-11 RX ADMIN — TOBRAMYCIN AND DEXAMETHASONE SCH DROP: 3; 1 SUSPENSION/ DROPS OPHTHALMIC at 09:27

## 2018-10-11 RX ADMIN — VANCOMYCIN HYDROCHLORIDE SCH MG: 125 CAPSULE ORAL at 17:44

## 2018-10-11 RX ADMIN — Medication SCH EACH: at 20:54

## 2018-10-11 RX ADMIN — DEXTROSE MONOHYDRATE PRN MLS/HR: 50 INJECTION, SOLUTION INTRAVENOUS at 00:11

## 2018-10-11 RX ADMIN — Medication SCH EACH: at 17:41

## 2018-10-11 RX ADMIN — DEXTROSE MONOHYDRATE PRN MLS/HR: 50 INJECTION, SOLUTION INTRAVENOUS at 23:48

## 2018-10-11 RX ADMIN — VANCOMYCIN HYDROCHLORIDE SCH MG: 125 CAPSULE ORAL at 05:29

## 2018-10-11 RX ADMIN — PIPERACILLIN SODIUM AND TAZOBACTAM SODIUM SCH MLS/HR: .25; 2 INJECTION, POWDER, LYOPHILIZED, FOR SOLUTION INTRAVENOUS at 05:30

## 2018-10-11 RX ADMIN — Medication SCH EACH: at 09:27

## 2018-10-11 RX ADMIN — NYSTATIN SCH APPLIC: 100000 CREAM TOPICAL at 14:06

## 2018-10-11 RX ADMIN — Medication SCH EACH: at 09:30

## 2018-10-11 RX ADMIN — DEXTROSE MONOHYDRATE PRN ML: 500 INJECTION PARENTERAL at 00:15

## 2018-10-11 RX ADMIN — DEXTROSE MONOHYDRATE PRN MLS/HR: 50 INJECTION, SOLUTION INTRAVENOUS at 07:19

## 2018-10-11 RX ADMIN — LEVETIRACETAM SCH MG: 250 TABLET, FILM COATED ORAL at 09:30

## 2018-10-11 RX ADMIN — MUPIROCIN SCH GM: 20 OINTMENT TOPICAL at 20:37

## 2018-10-11 RX ADMIN — Medication PRN ML: at 22:52

## 2018-10-11 RX ADMIN — VANCOMYCIN HYDROCHLORIDE SCH MG: 125 CAPSULE ORAL at 12:16

## 2018-10-11 RX ADMIN — DEXTROSE MONOHYDRATE PRN ML: 500 INJECTION PARENTERAL at 02:49

## 2018-10-11 RX ADMIN — VANCOMYCIN HYDROCHLORIDE SCH MG: 125 CAPSULE ORAL at 00:03

## 2018-10-11 RX ADMIN — DEXTROSE MONOHYDRATE PRN MLS/HR: 50 INJECTION, SOLUTION INTRAVENOUS at 14:05

## 2018-10-11 RX ADMIN — NYSTATIN SCH APPLIC: 100000 CREAM TOPICAL at 02:49

## 2018-10-11 RX ADMIN — PIPERACILLIN SODIUM AND TAZOBACTAM SODIUM SCH MLS/HR: .25; 2 INJECTION, POWDER, LYOPHILIZED, FOR SOLUTION INTRAVENOUS at 00:04

## 2018-10-11 RX ADMIN — Medication SCH EACH: at 05:30

## 2018-10-11 RX ADMIN — VANCOMYCIN HYDROCHLORIDE SCH MG: 125 CAPSULE ORAL at 23:32

## 2018-10-11 RX ADMIN — Medication SCH MLS/HR: at 17:41

## 2018-10-11 RX ADMIN — PIPERACILLIN SODIUM AND TAZOBACTAM SODIUM SCH MLS/HR: .25; 2 INJECTION, POWDER, LYOPHILIZED, FOR SOLUTION INTRAVENOUS at 20:36

## 2018-10-11 RX ADMIN — Medication SCH EACH: at 12:17

## 2018-10-11 RX ADMIN — Medication SCH EACH: at 17:42

## 2018-10-11 RX ADMIN — LEVETIRACETAM SCH MG: 250 TABLET, FILM COATED ORAL at 17:41

## 2018-10-11 RX ADMIN — TOBRAMYCIN AND DEXAMETHASONE SCH DROP: 3; 1 SUSPENSION/ DROPS OPHTHALMIC at 17:42

## 2018-10-11 RX ADMIN — SODIUM CHLORIDE SCH MG: 9 INJECTION, SOLUTION INTRAVENOUS at 09:29

## 2018-10-11 RX ADMIN — PIPERACILLIN SODIUM AND TAZOBACTAM SODIUM SCH MLS/HR: .25; 2 INJECTION, POWDER, LYOPHILIZED, FOR SOLUTION INTRAVENOUS at 12:17

## 2018-10-11 RX ADMIN — DEXTROSE AND SODIUM CHLORIDE PRN MLS/HR: 5; 900 INJECTION, SOLUTION INTRAVENOUS at 12:17

## 2018-10-11 RX ADMIN — DEXTROSE MONOHYDRATE PRN MLS/HR: 50 INJECTION, SOLUTION INTRAVENOUS at 20:56

## 2018-10-11 RX ADMIN — LEVOTHYROXINE SODIUM SCH MCG: 75 TABLET ORAL at 09:29

## 2018-10-11 NOTE — NUR
RN NOTES



PT IS CLOSELY MONITOR. STRICTLY OBSERVED FOR ISOLATION PRECAUTION DUE TO C- DIFF,  AFEBRILE 
TMAX 99.6, LATEST PLATELET REPORTED WAS 44 BY LORENZO FROM LAB, LATEST  BS 75 MG/DL. CONTINUE 
WITH  IVF D5NS @ 75 ML/HR AND LEVOPHED AT 39 MCG/MIN TITRATED AS ORDERED ON LEILANI MIDLINE.  
KEPT PT CLEAN AND COMFORTABLE. ENDORSED CONTINUITY OF CARE TO AM NURSE.

## 2018-10-11 NOTE — NUR
RN OPENING NOTES 

PATIENT IN BED, NONVERBAL UNABLE TO FOLLOW DIRECTIONS, RESPIRATIONS UNLABORED, TOLERATING 
CURRENT VENT SETTINGS.CONTINUED ON LEVOPHED @39MCG/MIN LEILANI PICC MIDLINE, PATENT AND INTACT. 
PT WITH HINA ARM AV SHUNT AND L CHEST PERMACATH. WILL CONTINUE TO MONITOR

## 2018-10-11 NOTE — NUR
WOUND CARE CONSULT   WOUND CARE RECEIVED CONSULT FOR MULTIPLE AREAS OF REDNESS AND WOUND ON 
THE RIGHT DELTOID.  WOUND CARE WILL DEFER CONSULT AND ALL TREATMENT PLANS TO SURGICAL TEAM 
WHO ARE CURRENTLY FOLLOWING. PATIENT WITH ZHAO AT 12, ALL PRESSURE ULCER PREVENTION 
MEASURES ARE NOTED TO BE IN PLACE AT THIS TIME.  WILL SEE PRN.

## 2018-10-11 NOTE — NUR
RN CLOSING NOTES 

PATIENT IN BED, NONVERBAL UNABLE TO FOLLOW DIRECTIONS, RESPIRATIONS UNLABORED, TOLERATING 
CURRENT VENT SETTINGS.CONTINUED ON LEVOPHED @38MCG/MIN LEILANI PICC MIDLINE, PATENT AND INTACT. 
PT WITH HINA ARM AV SHUNT NON FUNCTIONAL AND L CHEST PERMACATH. WILL CONTINUE TO MONITOR AND 
WILL ENDORSE TO NEXT SHIFT FOR CONTINUITY OF CARE

## 2018-10-11 NOTE — NUR
RECEIVED PT TRACHED ON VENT. PT TOLERATING VENT SETTINGS. SX'D FOR MOD AMT OF THICK YELLOW 
SECRETIONS. VENT ALARMS SET AND AUDIBLE. TRACH CUFF MLT, SECURED. VENT PLUGGED INTO RED 
OUTLET. AMBU BAG AT BEDSIDE. WILL CONTINUE TO MONITOR.

-------------------------------------------------------------------------------

Addendum: 10/11/18 at 2151 by LORENZO LEON RT

-------------------------------------------------------------------------------

Amended: Links added.

## 2018-10-11 NOTE — NUR
ICU/RN 

INCONTINENT OF LARGE AMT DIARRHEAL STOOL W/SMALL SOLID -FORMED BROWN STOOL.BED BATH 
GIVEN.SUCTIONED LARGE AMOUNT OF THISK YELLOW SECRETIONS FROM ETT AND MODERATE AMT 
BLOOD-TINGED SECRETIONS.

## 2018-10-11 NOTE — NUR
ICU/RN

RECEIVED PT W/EYES OPEN,DOES NOT TRACK,DOES NOT FOLLOW COMMANDS.ON VENT PER TRACH W/ FIO2 
50% W/ SATURATION OF 97%.ON LEVOPHED AT 36MGG/MIN,W/SBP OF 116MMHG-'S.

## 2018-10-11 NOTE — NUR
RN NOTES



BS = 54 MG/DL,  D50W  GIVEN AS ORDERED REPEAT AFTER 30 MINUTES   MG/DL WILL CONTINUE 
TO MONITOR.

## 2018-10-11 NOTE — NUR
RN NOTE



0830: S/E by Dr. Chiu, awaiting HD, aware for BS 50's, with order to do accucheck q4, 
also ordered Protonix for trending down H/H and + FOB. 



0920: S/E by Dr. Jones, no new order at this time. 



1100: HD done, patient tolerated well. HD nurse reported 1L out.



1200: Increased Nepro feeding rate to 45mL/hr as rec by RD, tolerated at this time, kept HOB 
elevated.



1630: S/E by Dr. Gabriel, sent sputum and rapid flu specimens as ordered.



1800: No any significant changes noted at this time. Still on Levo @ 40mcg, SBP remained 
>90. Kept clean, warm and dry. Isolation prec for CDiff, maintained and observed, no BM 
noted within the shift.

## 2018-10-12 VITALS — SYSTOLIC BLOOD PRESSURE: 131 MMHG | DIASTOLIC BLOOD PRESSURE: 57 MMHG

## 2018-10-12 VITALS — SYSTOLIC BLOOD PRESSURE: 108 MMHG | DIASTOLIC BLOOD PRESSURE: 58 MMHG

## 2018-10-12 VITALS — SYSTOLIC BLOOD PRESSURE: 83 MMHG | DIASTOLIC BLOOD PRESSURE: 55 MMHG

## 2018-10-12 VITALS — SYSTOLIC BLOOD PRESSURE: 114 MMHG | DIASTOLIC BLOOD PRESSURE: 51 MMHG

## 2018-10-12 VITALS — DIASTOLIC BLOOD PRESSURE: 53 MMHG | SYSTOLIC BLOOD PRESSURE: 121 MMHG

## 2018-10-12 VITALS — DIASTOLIC BLOOD PRESSURE: 56 MMHG | SYSTOLIC BLOOD PRESSURE: 110 MMHG

## 2018-10-12 VITALS — DIASTOLIC BLOOD PRESSURE: 40 MMHG | SYSTOLIC BLOOD PRESSURE: 101 MMHG

## 2018-10-12 VITALS — DIASTOLIC BLOOD PRESSURE: 53 MMHG | SYSTOLIC BLOOD PRESSURE: 102 MMHG

## 2018-10-12 VITALS — SYSTOLIC BLOOD PRESSURE: 116 MMHG | DIASTOLIC BLOOD PRESSURE: 59 MMHG

## 2018-10-12 VITALS — SYSTOLIC BLOOD PRESSURE: 120 MMHG | DIASTOLIC BLOOD PRESSURE: 65 MMHG

## 2018-10-12 VITALS — SYSTOLIC BLOOD PRESSURE: 133 MMHG | DIASTOLIC BLOOD PRESSURE: 60 MMHG

## 2018-10-12 VITALS — SYSTOLIC BLOOD PRESSURE: 92 MMHG | DIASTOLIC BLOOD PRESSURE: 52 MMHG

## 2018-10-12 VITALS — SYSTOLIC BLOOD PRESSURE: 129 MMHG | DIASTOLIC BLOOD PRESSURE: 56 MMHG

## 2018-10-12 VITALS — SYSTOLIC BLOOD PRESSURE: 114 MMHG | DIASTOLIC BLOOD PRESSURE: 65 MMHG

## 2018-10-12 VITALS — DIASTOLIC BLOOD PRESSURE: 60 MMHG | SYSTOLIC BLOOD PRESSURE: 116 MMHG

## 2018-10-12 VITALS — SYSTOLIC BLOOD PRESSURE: 113 MMHG | DIASTOLIC BLOOD PRESSURE: 59 MMHG

## 2018-10-12 VITALS — SYSTOLIC BLOOD PRESSURE: 120 MMHG | DIASTOLIC BLOOD PRESSURE: 62 MMHG

## 2018-10-12 VITALS — DIASTOLIC BLOOD PRESSURE: 58 MMHG | SYSTOLIC BLOOD PRESSURE: 113 MMHG

## 2018-10-12 VITALS — SYSTOLIC BLOOD PRESSURE: 122 MMHG | DIASTOLIC BLOOD PRESSURE: 59 MMHG

## 2018-10-12 VITALS — DIASTOLIC BLOOD PRESSURE: 52 MMHG | SYSTOLIC BLOOD PRESSURE: 98 MMHG

## 2018-10-12 VITALS — SYSTOLIC BLOOD PRESSURE: 120 MMHG | DIASTOLIC BLOOD PRESSURE: 54 MMHG

## 2018-10-12 VITALS — DIASTOLIC BLOOD PRESSURE: 56 MMHG | SYSTOLIC BLOOD PRESSURE: 118 MMHG

## 2018-10-12 VITALS — SYSTOLIC BLOOD PRESSURE: 100 MMHG | DIASTOLIC BLOOD PRESSURE: 50 MMHG

## 2018-10-12 VITALS — SYSTOLIC BLOOD PRESSURE: 122 MMHG | DIASTOLIC BLOOD PRESSURE: 55 MMHG

## 2018-10-12 VITALS — DIASTOLIC BLOOD PRESSURE: 54 MMHG | SYSTOLIC BLOOD PRESSURE: 115 MMHG

## 2018-10-12 VITALS — DIASTOLIC BLOOD PRESSURE: 51 MMHG | SYSTOLIC BLOOD PRESSURE: 119 MMHG

## 2018-10-12 VITALS — DIASTOLIC BLOOD PRESSURE: 52 MMHG | SYSTOLIC BLOOD PRESSURE: 119 MMHG

## 2018-10-12 VITALS — DIASTOLIC BLOOD PRESSURE: 57 MMHG | SYSTOLIC BLOOD PRESSURE: 115 MMHG

## 2018-10-12 VITALS — SYSTOLIC BLOOD PRESSURE: 121 MMHG | DIASTOLIC BLOOD PRESSURE: 57 MMHG

## 2018-10-12 VITALS — DIASTOLIC BLOOD PRESSURE: 55 MMHG | SYSTOLIC BLOOD PRESSURE: 83 MMHG

## 2018-10-12 VITALS — SYSTOLIC BLOOD PRESSURE: 117 MMHG | DIASTOLIC BLOOD PRESSURE: 53 MMHG

## 2018-10-12 VITALS — DIASTOLIC BLOOD PRESSURE: 59 MMHG | SYSTOLIC BLOOD PRESSURE: 101 MMHG

## 2018-10-12 VITALS — DIASTOLIC BLOOD PRESSURE: 59 MMHG | SYSTOLIC BLOOD PRESSURE: 117 MMHG

## 2018-10-12 VITALS — DIASTOLIC BLOOD PRESSURE: 51 MMHG | SYSTOLIC BLOOD PRESSURE: 88 MMHG

## 2018-10-12 VITALS — SYSTOLIC BLOOD PRESSURE: 107 MMHG | DIASTOLIC BLOOD PRESSURE: 52 MMHG

## 2018-10-12 VITALS — SYSTOLIC BLOOD PRESSURE: 107 MMHG | DIASTOLIC BLOOD PRESSURE: 85 MMHG

## 2018-10-12 VITALS — DIASTOLIC BLOOD PRESSURE: 61 MMHG | SYSTOLIC BLOOD PRESSURE: 121 MMHG

## 2018-10-12 VITALS — SYSTOLIC BLOOD PRESSURE: 119 MMHG | DIASTOLIC BLOOD PRESSURE: 56 MMHG

## 2018-10-12 VITALS — SYSTOLIC BLOOD PRESSURE: 99 MMHG | DIASTOLIC BLOOD PRESSURE: 55 MMHG

## 2018-10-12 VITALS — SYSTOLIC BLOOD PRESSURE: 112 MMHG | DIASTOLIC BLOOD PRESSURE: 54 MMHG

## 2018-10-12 VITALS — DIASTOLIC BLOOD PRESSURE: 51 MMHG | SYSTOLIC BLOOD PRESSURE: 94 MMHG

## 2018-10-12 VITALS — SYSTOLIC BLOOD PRESSURE: 112 MMHG | DIASTOLIC BLOOD PRESSURE: 55 MMHG

## 2018-10-12 VITALS — SYSTOLIC BLOOD PRESSURE: 92 MMHG | DIASTOLIC BLOOD PRESSURE: 44 MMHG

## 2018-10-12 VITALS — SYSTOLIC BLOOD PRESSURE: 94 MMHG | DIASTOLIC BLOOD PRESSURE: 51 MMHG

## 2018-10-12 VITALS — DIASTOLIC BLOOD PRESSURE: 57 MMHG | SYSTOLIC BLOOD PRESSURE: 110 MMHG

## 2018-10-12 VITALS — DIASTOLIC BLOOD PRESSURE: 53 MMHG | SYSTOLIC BLOOD PRESSURE: 88 MMHG

## 2018-10-12 VITALS — DIASTOLIC BLOOD PRESSURE: 54 MMHG | SYSTOLIC BLOOD PRESSURE: 114 MMHG

## 2018-10-12 VITALS — DIASTOLIC BLOOD PRESSURE: 56 MMHG | SYSTOLIC BLOOD PRESSURE: 125 MMHG

## 2018-10-12 VITALS — DIASTOLIC BLOOD PRESSURE: 93 MMHG | SYSTOLIC BLOOD PRESSURE: 117 MMHG

## 2018-10-12 VITALS — SYSTOLIC BLOOD PRESSURE: 111 MMHG | DIASTOLIC BLOOD PRESSURE: 68 MMHG

## 2018-10-12 VITALS — SYSTOLIC BLOOD PRESSURE: 89 MMHG | DIASTOLIC BLOOD PRESSURE: 58 MMHG

## 2018-10-12 VITALS — SYSTOLIC BLOOD PRESSURE: 130 MMHG | DIASTOLIC BLOOD PRESSURE: 54 MMHG

## 2018-10-12 VITALS — SYSTOLIC BLOOD PRESSURE: 110 MMHG | DIASTOLIC BLOOD PRESSURE: 53 MMHG

## 2018-10-12 VITALS — DIASTOLIC BLOOD PRESSURE: 59 MMHG | SYSTOLIC BLOOD PRESSURE: 128 MMHG

## 2018-10-12 VITALS — SYSTOLIC BLOOD PRESSURE: 114 MMHG | DIASTOLIC BLOOD PRESSURE: 49 MMHG

## 2018-10-12 VITALS — SYSTOLIC BLOOD PRESSURE: 101 MMHG | DIASTOLIC BLOOD PRESSURE: 51 MMHG

## 2018-10-12 VITALS — SYSTOLIC BLOOD PRESSURE: 109 MMHG | DIASTOLIC BLOOD PRESSURE: 47 MMHG

## 2018-10-12 VITALS — DIASTOLIC BLOOD PRESSURE: 58 MMHG | SYSTOLIC BLOOD PRESSURE: 123 MMHG

## 2018-10-12 VITALS — SYSTOLIC BLOOD PRESSURE: 123 MMHG | DIASTOLIC BLOOD PRESSURE: 55 MMHG

## 2018-10-12 VITALS — DIASTOLIC BLOOD PRESSURE: 56 MMHG | SYSTOLIC BLOOD PRESSURE: 112 MMHG

## 2018-10-12 VITALS — DIASTOLIC BLOOD PRESSURE: 55 MMHG | SYSTOLIC BLOOD PRESSURE: 112 MMHG

## 2018-10-12 VITALS — DIASTOLIC BLOOD PRESSURE: 60 MMHG | SYSTOLIC BLOOD PRESSURE: 115 MMHG

## 2018-10-12 VITALS — DIASTOLIC BLOOD PRESSURE: 54 MMHG | SYSTOLIC BLOOD PRESSURE: 119 MMHG

## 2018-10-12 VITALS — DIASTOLIC BLOOD PRESSURE: 40 MMHG | SYSTOLIC BLOOD PRESSURE: 102 MMHG

## 2018-10-12 VITALS — SYSTOLIC BLOOD PRESSURE: 114 MMHG | DIASTOLIC BLOOD PRESSURE: 52 MMHG

## 2018-10-12 VITALS — SYSTOLIC BLOOD PRESSURE: 110 MMHG | DIASTOLIC BLOOD PRESSURE: 49 MMHG

## 2018-10-12 VITALS — DIASTOLIC BLOOD PRESSURE: 60 MMHG | SYSTOLIC BLOOD PRESSURE: 96 MMHG

## 2018-10-12 VITALS — SYSTOLIC BLOOD PRESSURE: 113 MMHG | DIASTOLIC BLOOD PRESSURE: 54 MMHG

## 2018-10-12 VITALS — SYSTOLIC BLOOD PRESSURE: 107 MMHG | DIASTOLIC BLOOD PRESSURE: 58 MMHG

## 2018-10-12 VITALS — DIASTOLIC BLOOD PRESSURE: 57 MMHG | SYSTOLIC BLOOD PRESSURE: 94 MMHG

## 2018-10-12 VITALS — DIASTOLIC BLOOD PRESSURE: 50 MMHG | SYSTOLIC BLOOD PRESSURE: 115 MMHG

## 2018-10-12 VITALS — DIASTOLIC BLOOD PRESSURE: 58 MMHG | SYSTOLIC BLOOD PRESSURE: 106 MMHG

## 2018-10-12 VITALS — DIASTOLIC BLOOD PRESSURE: 60 MMHG | SYSTOLIC BLOOD PRESSURE: 133 MMHG

## 2018-10-12 VITALS — SYSTOLIC BLOOD PRESSURE: 115 MMHG | DIASTOLIC BLOOD PRESSURE: 60 MMHG

## 2018-10-12 VITALS — DIASTOLIC BLOOD PRESSURE: 54 MMHG | SYSTOLIC BLOOD PRESSURE: 93 MMHG

## 2018-10-12 VITALS — SYSTOLIC BLOOD PRESSURE: 121 MMHG | DIASTOLIC BLOOD PRESSURE: 64 MMHG

## 2018-10-12 VITALS — SYSTOLIC BLOOD PRESSURE: 105 MMHG | DIASTOLIC BLOOD PRESSURE: 55 MMHG

## 2018-10-12 VITALS — DIASTOLIC BLOOD PRESSURE: 54 MMHG | SYSTOLIC BLOOD PRESSURE: 105 MMHG

## 2018-10-12 VITALS — DIASTOLIC BLOOD PRESSURE: 51 MMHG | SYSTOLIC BLOOD PRESSURE: 104 MMHG

## 2018-10-12 VITALS — SYSTOLIC BLOOD PRESSURE: 117 MMHG | DIASTOLIC BLOOD PRESSURE: 58 MMHG

## 2018-10-12 VITALS — SYSTOLIC BLOOD PRESSURE: 111 MMHG | DIASTOLIC BLOOD PRESSURE: 66 MMHG

## 2018-10-12 VITALS — DIASTOLIC BLOOD PRESSURE: 58 MMHG | SYSTOLIC BLOOD PRESSURE: 119 MMHG

## 2018-10-12 VITALS — SYSTOLIC BLOOD PRESSURE: 103 MMHG | DIASTOLIC BLOOD PRESSURE: 50 MMHG

## 2018-10-12 VITALS — DIASTOLIC BLOOD PRESSURE: 57 MMHG | SYSTOLIC BLOOD PRESSURE: 105 MMHG

## 2018-10-12 VITALS — SYSTOLIC BLOOD PRESSURE: 126 MMHG | DIASTOLIC BLOOD PRESSURE: 56 MMHG

## 2018-10-12 VITALS — SYSTOLIC BLOOD PRESSURE: 115 MMHG | DIASTOLIC BLOOD PRESSURE: 56 MMHG

## 2018-10-12 VITALS — SYSTOLIC BLOOD PRESSURE: 116 MMHG | DIASTOLIC BLOOD PRESSURE: 60 MMHG

## 2018-10-12 VITALS — SYSTOLIC BLOOD PRESSURE: 100 MMHG | DIASTOLIC BLOOD PRESSURE: 51 MMHG

## 2018-10-12 VITALS — SYSTOLIC BLOOD PRESSURE: 118 MMHG | DIASTOLIC BLOOD PRESSURE: 70 MMHG

## 2018-10-12 VITALS — SYSTOLIC BLOOD PRESSURE: 125 MMHG | DIASTOLIC BLOOD PRESSURE: 41 MMHG

## 2018-10-12 VITALS — DIASTOLIC BLOOD PRESSURE: 53 MMHG | SYSTOLIC BLOOD PRESSURE: 103 MMHG

## 2018-10-12 VITALS — SYSTOLIC BLOOD PRESSURE: 110 MMHG | DIASTOLIC BLOOD PRESSURE: 55 MMHG

## 2018-10-12 VITALS — DIASTOLIC BLOOD PRESSURE: 53 MMHG | SYSTOLIC BLOOD PRESSURE: 111 MMHG

## 2018-10-12 VITALS — SYSTOLIC BLOOD PRESSURE: 106 MMHG | DIASTOLIC BLOOD PRESSURE: 52 MMHG

## 2018-10-12 VITALS — SYSTOLIC BLOOD PRESSURE: 88 MMHG | DIASTOLIC BLOOD PRESSURE: 51 MMHG

## 2018-10-12 VITALS — SYSTOLIC BLOOD PRESSURE: 101 MMHG | DIASTOLIC BLOOD PRESSURE: 55 MMHG

## 2018-10-12 VITALS — DIASTOLIC BLOOD PRESSURE: 68 MMHG | SYSTOLIC BLOOD PRESSURE: 111 MMHG

## 2018-10-12 VITALS — SYSTOLIC BLOOD PRESSURE: 99 MMHG | DIASTOLIC BLOOD PRESSURE: 59 MMHG

## 2018-10-12 VITALS — SYSTOLIC BLOOD PRESSURE: 97 MMHG | DIASTOLIC BLOOD PRESSURE: 63 MMHG

## 2018-10-12 LAB
BASOPHILS # BLD AUTO: 0 /CMM (ref 0–0.2)
BASOPHILS NFR BLD AUTO: 0 % (ref 0–2)
BUN SERPL-MCNC: 76 MG/DL (ref 7–18)
CALCIUM SERPL-MCNC: 8.9 MG/DL (ref 8.5–10.1)
CHLORIDE SERPL-SCNC: 93 MMOL/L (ref 98–107)
CO2 SERPL-SCNC: 20 MMOL/L (ref 21–32)
CREAT SERPL-MCNC: 3.2 MG/DL (ref 0.6–1.3)
EOSINOPHIL NFR BLD AUTO: 1.9 % (ref 0–6)
EOSINOPHIL NFR BLD MANUAL: 6 % (ref 0–4)
GLUCOSE SERPL-MCNC: 78 MG/DL (ref 74–106)
HCT VFR BLD AUTO: 22 % (ref 39–51)
HGB BLD-MCNC: 6.9 G/DL (ref 13.5–17.5)
LYMPHOCYTES NFR BLD AUTO: 0.7 /CMM (ref 0.8–4.8)
LYMPHOCYTES NFR BLD AUTO: 3.7 % (ref 20–44)
LYMPHOCYTES NFR BLD MANUAL: 5 % (ref 16–48)
MAGNESIUM SERPL-MCNC: 2.1 MG/DL (ref 1.8–2.4)
MCHC RBC AUTO-ENTMCNC: 31 G/DL (ref 31–36)
MCV RBC AUTO: 104 FL (ref 80–96)
METAMYELOCYTES NFR BLD MANUAL: 4 % (ref 0–0)
MONOCYTES NFR BLD AUTO: 0.6 /CMM (ref 0.1–1.3)
MONOCYTES NFR BLD AUTO: 3.2 % (ref 2–12)
MONOCYTES NFR BLD MANUAL: 2 % (ref 0–11)
NEUTROPHILS # BLD AUTO: 18 /CMM (ref 1.8–8.9)
NEUTROPHILS NFR BLD AUTO: 91.2 % (ref 43–81)
NEUTS BAND NFR BLD MANUAL: 54 % (ref 0–5)
NEUTS SEG NFR BLD MANUAL: 29 % (ref 42–76)
PHOSPHATE SERPL-MCNC: 3.7 MG/DL (ref 2.5–4.9)
PLATELET # BLD AUTO: 39 /CMM (ref 150–450)
POTASSIUM SERPL-SCNC: 4.2 MMOL/L (ref 3.5–5.1)
RBC # BLD AUTO: 2.12 MIL/UL (ref 4.5–6)
RDW COEFFICIENT OF VARIATION: 23.8 (ref 11.5–15)
SODIUM SERPL-SCNC: 126 MMOL/L (ref 136–145)
WBC NRBC COR # BLD AUTO: 19.8 K/UL (ref 4.3–11)

## 2018-10-12 PROCEDURE — 5A1D70Z PERFORMANCE OF URINARY FILTRATION, INTERMITTENT, LESS THAN 6 HOURS PER DAY: ICD-10-PCS | Performed by: INTERNAL MEDICINE

## 2018-10-12 PROCEDURE — 30233N1 TRANSFUSION OF NONAUTOLOGOUS RED BLOOD CELLS INTO PERIPHERAL VEIN, PERCUTANEOUS APPROACH: ICD-10-PCS | Performed by: INTERNAL MEDICINE

## 2018-10-12 RX ADMIN — TOBRAMYCIN AND DEXAMETHASONE SCH DROP: 3; 1 SUSPENSION/ DROPS OPHTHALMIC at 09:00

## 2018-10-12 RX ADMIN — TOBRAMYCIN AND DEXAMETHASONE SCH DROP: 3; 1 SUSPENSION/ DROPS OPHTHALMIC at 17:02

## 2018-10-12 RX ADMIN — VANCOMYCIN HYDROCHLORIDE SCH MG: 125 CAPSULE ORAL at 23:40

## 2018-10-12 RX ADMIN — MEROPENEM SCH MLS/HR: 500 INJECTION INTRAVENOUS at 18:02

## 2018-10-12 RX ADMIN — Medication SCH EACH: at 17:02

## 2018-10-12 RX ADMIN — Medication SCH MLS/HR: at 17:02

## 2018-10-12 RX ADMIN — DEXTROSE AND SODIUM CHLORIDE PRN MLS/HR: 5; 900 INJECTION, SOLUTION INTRAVENOUS at 02:23

## 2018-10-12 RX ADMIN — Medication SCH EACH: at 20:23

## 2018-10-12 RX ADMIN — LEVOTHYROXINE SODIUM SCH MCG: 75 TABLET ORAL at 08:44

## 2018-10-12 RX ADMIN — Medication PRN ML: at 17:03

## 2018-10-12 RX ADMIN — MUPIROCIN SCH GM: 20 OINTMENT TOPICAL at 20:25

## 2018-10-12 RX ADMIN — NYSTATIN SCH APPLIC: 100000 CREAM TOPICAL at 02:00

## 2018-10-12 RX ADMIN — VANCOMYCIN HYDROCHLORIDE SCH MG: 125 CAPSULE ORAL at 12:00

## 2018-10-12 RX ADMIN — DEXTROSE MONOHYDRATE PRN MLS/HR: 50 INJECTION, SOLUTION INTRAVENOUS at 17:13

## 2018-10-12 RX ADMIN — Medication SCH EACH: at 04:51

## 2018-10-12 RX ADMIN — Medication SCH MLS/HR: at 00:55

## 2018-10-12 RX ADMIN — MUPIROCIN SCH GM: 20 OINTMENT TOPICAL at 08:45

## 2018-10-12 RX ADMIN — LEVETIRACETAM SCH MG: 250 TABLET, FILM COATED ORAL at 17:02

## 2018-10-12 RX ADMIN — PIPERACILLIN SODIUM AND TAZOBACTAM SODIUM SCH MLS/HR: .25; 2 INJECTION, POWDER, LYOPHILIZED, FOR SOLUTION INTRAVENOUS at 04:43

## 2018-10-12 RX ADMIN — DEXTROSE MONOHYDRATE PRN MLS/HR: 50 INJECTION, SOLUTION INTRAVENOUS at 03:48

## 2018-10-12 RX ADMIN — DEXTROSE MONOHYDRATE PRN MLS/HR: 50 INJECTION, SOLUTION INTRAVENOUS at 18:34

## 2018-10-12 RX ADMIN — Medication SCH EACH: at 12:32

## 2018-10-12 RX ADMIN — DEXTROSE MONOHYDRATE PRN MLS/HR: 50 INJECTION, SOLUTION INTRAVENOUS at 10:24

## 2018-10-12 RX ADMIN — VANCOMYCIN HYDROCHLORIDE SCH MG: 125 CAPSULE ORAL at 06:29

## 2018-10-12 RX ADMIN — NYSTATIN SCH APPLIC: 100000 CREAM TOPICAL at 14:43

## 2018-10-12 RX ADMIN — Medication SCH EACH: at 09:28

## 2018-10-12 RX ADMIN — VANCOMYCIN HYDROCHLORIDE SCH MG: 125 CAPSULE ORAL at 17:02

## 2018-10-12 RX ADMIN — DEXTROSE AND SODIUM CHLORIDE PRN MLS/HR: 5; 900 INJECTION, SOLUTION INTRAVENOUS at 17:03

## 2018-10-12 RX ADMIN — SODIUM CHLORIDE SCH MG: 9 INJECTION, SOLUTION INTRAVENOUS at 08:40

## 2018-10-12 RX ADMIN — PIPERACILLIN SODIUM AND TAZOBACTAM SODIUM SCH MLS/HR: .25; 2 INJECTION, POWDER, LYOPHILIZED, FOR SOLUTION INTRAVENOUS at 16:13

## 2018-10-12 RX ADMIN — LEVETIRACETAM SCH MG: 250 TABLET, FILM COATED ORAL at 08:40

## 2018-10-12 RX ADMIN — Medication SCH MLS/HR: at 08:40

## 2018-10-12 RX ADMIN — Medication SCH EACH: at 00:56

## 2018-10-12 RX ADMIN — Medication SCH EACH: at 08:40

## 2018-10-12 RX ADMIN — Medication SCH EACH: at 17:17

## 2018-10-12 NOTE — NUR
ICU/RN

GI NP HERE TO SEE PT.THINKING ABOUT PT HAVIN A BOWEL REST,WILL ORDER .LATER,INFORMED NP RE 
LOW NORMAL BLOOD SUGAR.

## 2018-10-12 NOTE — NUR
RN CLOSING NOTED



PT KEPT COMFORTABLE. TRACH IN PLACE. AIRWAY PATENT. NO RESPIRATORY DISTRESS NOTED. PT ON 
LEVO AT 36MCG/MIN. MICHAEL OFF AT 1100. TITRATED ACCORDINGLY. IVF IN PLACE. TOLERATING GTF WELL. 
KEPT CLEAN AND DRY.REPOSITIONED Q2. WILL ENDORSE FOR CONTINUITY OF CARE

## 2018-10-12 NOTE — NUR
RN NOTES



0800 SEEN AND EXAMINED BY DR REDMOND. PT ON LEVO AND MICHAEL DRIP, TITRATING ACCORDINGLY. MD 
AWARE OF CURRENT LAB VALUES: WBC 19.8, HGB 6.9, HCT 22, PLATELET 39. NO SIGNS OF ACTIVE 
BLEEDING NOTED. MD ALSO AWARE OF CXR RESULT. MD ORDERED 1 UNIT OF PRBC AND LABS IN AM. WILL 
CONTINUE TO MONITOR.



0900 SEEN AND EXAMINED BY DR RIVERA AND DR GAITAN. PT TRACH'S IN PLACE. TOLERATING VENT WELL. 
NO RESPIRATORY DISTRESS NOTED. PT ON MICHAEL AND LEVO, KEEPING SBP>90MMHG. TOLERATING GTF WELL. 
FOR BLOOD TRANSFUSION, 1 UNIT PRBC. WILL MONITOR

## 2018-10-12 NOTE — NUR
pts face has significantly increased in size, pt face and neck swelled up, pt is not loosing 
any volume still ventilating okay, and the PIP pressure is at 35, Maureen AND [place] were 
informed about the changes, chest X-ray is recommended, per SANTOS Rowan they do not 
think pt has subcutaneous emphysema.      

-------------------------------------------------------------------------------

Addendum: 10/12/18 at 0151 by LORENZO LEON RT

-------------------------------------------------------------------------------

Amended: Links added.

## 2018-10-12 NOTE — NUR
RN INITIAL NOTES



RECEIVED PT OBTUNED. TRACH IN PLACE, TOLERATING VENT WELL. NO RESPIRATORY DISTRESS NOTED. NO 
HOB ELEVATED. NO SIGNS OF PAIN NOTED. PT ON LEVO AT 40MCG/MIN AND MICHAEL AT 80MCG/MIN. WILL 
MONITOR BP CLOSELY. IVF INFUSING. GT IN PLACE. TOLERATING GTF WELL. NO RESIDUAL NOTED. BLE 
ELEVATED. PT COMFORTABLE. WILL MONITOR

## 2018-10-12 NOTE — NUR
RN NOTES



1 UNIT OF PRBC STARTED AT 1140. VS WNL. HD STARTED AT 1200. 1 UNIT OF PRBC CONTINUED WITH 
HD. PT REMAINS ON LEVO AT 40MCG/MIN. WILL CLOSELY MONITOR BP.

## 2018-10-12 NOTE — NUR
PATIENT REC'D TRACHED ON [facility] VENT WITH ORDERED SETTINGS.  VENT ALARMS CHECKED + AUDIBLE. 
CUFF PRESSURE CHECKED MLT. PATIENT AIRWAY SUCTIONED AND PATENT. MOD AMT PETERSEN SEMITHICK 
SECRETIONS. B/S DIM COARSE. PATIENT NON RESPONSIVE. AMBU BAG AT [facility]. 

-------------------------------------------------------------------------------

Addendum: 10/12/18 at 0927 by OLAYINKA POZO RT

-------------------------------------------------------------------------------

Amended: Links added.

## 2018-10-12 NOTE — NUR
ICU/RN

RECEIVED PT W/ EYES CLOSED,FACE EXTREMELY SWOLLEN,SAME AS LAST NIGHT.ON VENT PER TRACHW/ 
COI704% SATURATION OF 96%.SUCTIONED ORALLY FOR MOD. THICK WHITE TO BLOOD-TINGED 
SECRETIONS.TOLERATING TUBE FEEDINGS W/OUT RESIDUAL.

## 2018-10-13 VITALS — SYSTOLIC BLOOD PRESSURE: 113 MMHG | DIASTOLIC BLOOD PRESSURE: 52 MMHG

## 2018-10-13 VITALS — SYSTOLIC BLOOD PRESSURE: 77 MMHG | DIASTOLIC BLOOD PRESSURE: 44 MMHG

## 2018-10-13 VITALS — DIASTOLIC BLOOD PRESSURE: 50 MMHG | SYSTOLIC BLOOD PRESSURE: 99 MMHG

## 2018-10-13 VITALS — DIASTOLIC BLOOD PRESSURE: 63 MMHG | SYSTOLIC BLOOD PRESSURE: 103 MMHG

## 2018-10-13 VITALS — DIASTOLIC BLOOD PRESSURE: 87 MMHG | SYSTOLIC BLOOD PRESSURE: 141 MMHG

## 2018-10-13 VITALS — SYSTOLIC BLOOD PRESSURE: 97 MMHG | DIASTOLIC BLOOD PRESSURE: 48 MMHG

## 2018-10-13 VITALS — SYSTOLIC BLOOD PRESSURE: 78 MMHG | DIASTOLIC BLOOD PRESSURE: 44 MMHG

## 2018-10-13 VITALS — SYSTOLIC BLOOD PRESSURE: 79 MMHG | DIASTOLIC BLOOD PRESSURE: 43 MMHG

## 2018-10-13 VITALS — DIASTOLIC BLOOD PRESSURE: 61 MMHG | SYSTOLIC BLOOD PRESSURE: 104 MMHG

## 2018-10-13 VITALS — SYSTOLIC BLOOD PRESSURE: 126 MMHG | DIASTOLIC BLOOD PRESSURE: 61 MMHG

## 2018-10-13 VITALS — SYSTOLIC BLOOD PRESSURE: 146 MMHG | DIASTOLIC BLOOD PRESSURE: 87 MMHG

## 2018-10-13 VITALS — DIASTOLIC BLOOD PRESSURE: 58 MMHG | SYSTOLIC BLOOD PRESSURE: 129 MMHG

## 2018-10-13 VITALS — DIASTOLIC BLOOD PRESSURE: 61 MMHG | SYSTOLIC BLOOD PRESSURE: 126 MMHG

## 2018-10-13 VITALS — SYSTOLIC BLOOD PRESSURE: 100 MMHG | DIASTOLIC BLOOD PRESSURE: 42 MMHG

## 2018-10-13 VITALS — DIASTOLIC BLOOD PRESSURE: 59 MMHG | SYSTOLIC BLOOD PRESSURE: 117 MMHG

## 2018-10-13 VITALS — DIASTOLIC BLOOD PRESSURE: 60 MMHG | SYSTOLIC BLOOD PRESSURE: 133 MMHG

## 2018-10-13 VITALS — DIASTOLIC BLOOD PRESSURE: 42 MMHG | SYSTOLIC BLOOD PRESSURE: 86 MMHG

## 2018-10-13 VITALS — SYSTOLIC BLOOD PRESSURE: 90 MMHG | DIASTOLIC BLOOD PRESSURE: 47 MMHG

## 2018-10-13 VITALS — SYSTOLIC BLOOD PRESSURE: 98 MMHG | DIASTOLIC BLOOD PRESSURE: 79 MMHG

## 2018-10-13 VITALS — DIASTOLIC BLOOD PRESSURE: 57 MMHG | SYSTOLIC BLOOD PRESSURE: 111 MMHG

## 2018-10-13 VITALS — SYSTOLIC BLOOD PRESSURE: 107 MMHG | DIASTOLIC BLOOD PRESSURE: 56 MMHG

## 2018-10-13 VITALS — SYSTOLIC BLOOD PRESSURE: 106 MMHG | DIASTOLIC BLOOD PRESSURE: 52 MMHG

## 2018-10-13 VITALS — SYSTOLIC BLOOD PRESSURE: 108 MMHG | DIASTOLIC BLOOD PRESSURE: 47 MMHG

## 2018-10-13 VITALS — DIASTOLIC BLOOD PRESSURE: 54 MMHG | SYSTOLIC BLOOD PRESSURE: 124 MMHG

## 2018-10-13 VITALS — DIASTOLIC BLOOD PRESSURE: 44 MMHG | SYSTOLIC BLOOD PRESSURE: 93 MMHG

## 2018-10-13 VITALS — DIASTOLIC BLOOD PRESSURE: 58 MMHG | SYSTOLIC BLOOD PRESSURE: 123 MMHG

## 2018-10-13 VITALS — SYSTOLIC BLOOD PRESSURE: 109 MMHG | DIASTOLIC BLOOD PRESSURE: 53 MMHG

## 2018-10-13 VITALS — DIASTOLIC BLOOD PRESSURE: 76 MMHG | SYSTOLIC BLOOD PRESSURE: 89 MMHG

## 2018-10-13 VITALS — SYSTOLIC BLOOD PRESSURE: 111 MMHG | DIASTOLIC BLOOD PRESSURE: 63 MMHG

## 2018-10-13 VITALS — DIASTOLIC BLOOD PRESSURE: 89 MMHG | SYSTOLIC BLOOD PRESSURE: 123 MMHG

## 2018-10-13 VITALS — DIASTOLIC BLOOD PRESSURE: 47 MMHG | SYSTOLIC BLOOD PRESSURE: 96 MMHG

## 2018-10-13 VITALS — SYSTOLIC BLOOD PRESSURE: 110 MMHG | DIASTOLIC BLOOD PRESSURE: 59 MMHG

## 2018-10-13 VITALS — DIASTOLIC BLOOD PRESSURE: 59 MMHG | SYSTOLIC BLOOD PRESSURE: 113 MMHG

## 2018-10-13 VITALS — DIASTOLIC BLOOD PRESSURE: 46 MMHG | SYSTOLIC BLOOD PRESSURE: 86 MMHG

## 2018-10-13 VITALS — DIASTOLIC BLOOD PRESSURE: 87 MMHG | SYSTOLIC BLOOD PRESSURE: 121 MMHG

## 2018-10-13 VITALS — SYSTOLIC BLOOD PRESSURE: 122 MMHG | DIASTOLIC BLOOD PRESSURE: 60 MMHG

## 2018-10-13 VITALS — SYSTOLIC BLOOD PRESSURE: 128 MMHG | DIASTOLIC BLOOD PRESSURE: 55 MMHG

## 2018-10-13 VITALS — DIASTOLIC BLOOD PRESSURE: 47 MMHG | SYSTOLIC BLOOD PRESSURE: 94 MMHG

## 2018-10-13 VITALS — DIASTOLIC BLOOD PRESSURE: 69 MMHG | SYSTOLIC BLOOD PRESSURE: 98 MMHG

## 2018-10-13 VITALS — DIASTOLIC BLOOD PRESSURE: 41 MMHG | SYSTOLIC BLOOD PRESSURE: 98 MMHG

## 2018-10-13 VITALS — DIASTOLIC BLOOD PRESSURE: 52 MMHG | SYSTOLIC BLOOD PRESSURE: 99 MMHG

## 2018-10-13 VITALS — SYSTOLIC BLOOD PRESSURE: 92 MMHG | DIASTOLIC BLOOD PRESSURE: 51 MMHG

## 2018-10-13 VITALS — SYSTOLIC BLOOD PRESSURE: 133 MMHG | DIASTOLIC BLOOD PRESSURE: 55 MMHG

## 2018-10-13 VITALS — DIASTOLIC BLOOD PRESSURE: 40 MMHG | SYSTOLIC BLOOD PRESSURE: 101 MMHG

## 2018-10-13 VITALS — SYSTOLIC BLOOD PRESSURE: 115 MMHG | DIASTOLIC BLOOD PRESSURE: 63 MMHG

## 2018-10-13 VITALS — SYSTOLIC BLOOD PRESSURE: 109 MMHG | DIASTOLIC BLOOD PRESSURE: 54 MMHG

## 2018-10-13 VITALS — SYSTOLIC BLOOD PRESSURE: 123 MMHG | DIASTOLIC BLOOD PRESSURE: 89 MMHG

## 2018-10-13 VITALS — DIASTOLIC BLOOD PRESSURE: 50 MMHG | SYSTOLIC BLOOD PRESSURE: 82 MMHG

## 2018-10-13 VITALS — SYSTOLIC BLOOD PRESSURE: 118 MMHG | DIASTOLIC BLOOD PRESSURE: 56 MMHG

## 2018-10-13 VITALS — SYSTOLIC BLOOD PRESSURE: 96 MMHG | DIASTOLIC BLOOD PRESSURE: 47 MMHG

## 2018-10-13 VITALS — DIASTOLIC BLOOD PRESSURE: 55 MMHG | SYSTOLIC BLOOD PRESSURE: 116 MMHG

## 2018-10-13 VITALS — SYSTOLIC BLOOD PRESSURE: 104 MMHG | DIASTOLIC BLOOD PRESSURE: 40 MMHG

## 2018-10-13 VITALS — DIASTOLIC BLOOD PRESSURE: 63 MMHG | SYSTOLIC BLOOD PRESSURE: 112 MMHG

## 2018-10-13 VITALS — DIASTOLIC BLOOD PRESSURE: 57 MMHG | SYSTOLIC BLOOD PRESSURE: 115 MMHG

## 2018-10-13 VITALS — DIASTOLIC BLOOD PRESSURE: 50 MMHG | SYSTOLIC BLOOD PRESSURE: 106 MMHG

## 2018-10-13 VITALS — DIASTOLIC BLOOD PRESSURE: 92 MMHG | SYSTOLIC BLOOD PRESSURE: 135 MMHG

## 2018-10-13 VITALS — DIASTOLIC BLOOD PRESSURE: 73 MMHG | SYSTOLIC BLOOD PRESSURE: 121 MMHG

## 2018-10-13 VITALS — DIASTOLIC BLOOD PRESSURE: 61 MMHG | SYSTOLIC BLOOD PRESSURE: 107 MMHG

## 2018-10-13 VITALS — DIASTOLIC BLOOD PRESSURE: 58 MMHG | SYSTOLIC BLOOD PRESSURE: 117 MMHG

## 2018-10-13 VITALS — SYSTOLIC BLOOD PRESSURE: 116 MMHG | DIASTOLIC BLOOD PRESSURE: 56 MMHG

## 2018-10-13 VITALS — SYSTOLIC BLOOD PRESSURE: 98 MMHG | DIASTOLIC BLOOD PRESSURE: 44 MMHG

## 2018-10-13 VITALS — DIASTOLIC BLOOD PRESSURE: 36 MMHG | SYSTOLIC BLOOD PRESSURE: 98 MMHG

## 2018-10-13 VITALS — SYSTOLIC BLOOD PRESSURE: 89 MMHG | DIASTOLIC BLOOD PRESSURE: 47 MMHG

## 2018-10-13 VITALS — DIASTOLIC BLOOD PRESSURE: 60 MMHG | SYSTOLIC BLOOD PRESSURE: 119 MMHG

## 2018-10-13 VITALS — SYSTOLIC BLOOD PRESSURE: 100 MMHG | DIASTOLIC BLOOD PRESSURE: 56 MMHG

## 2018-10-13 VITALS — DIASTOLIC BLOOD PRESSURE: 59 MMHG | SYSTOLIC BLOOD PRESSURE: 115 MMHG

## 2018-10-13 VITALS — SYSTOLIC BLOOD PRESSURE: 98 MMHG | DIASTOLIC BLOOD PRESSURE: 70 MMHG

## 2018-10-13 VITALS — SYSTOLIC BLOOD PRESSURE: 78 MMHG | DIASTOLIC BLOOD PRESSURE: 49 MMHG

## 2018-10-13 VITALS — SYSTOLIC BLOOD PRESSURE: 116 MMHG | DIASTOLIC BLOOD PRESSURE: 69 MMHG

## 2018-10-13 VITALS — SYSTOLIC BLOOD PRESSURE: 133 MMHG | DIASTOLIC BLOOD PRESSURE: 72 MMHG

## 2018-10-13 VITALS — SYSTOLIC BLOOD PRESSURE: 88 MMHG | DIASTOLIC BLOOD PRESSURE: 46 MMHG

## 2018-10-13 VITALS — DIASTOLIC BLOOD PRESSURE: 57 MMHG | SYSTOLIC BLOOD PRESSURE: 106 MMHG

## 2018-10-13 VITALS — SYSTOLIC BLOOD PRESSURE: 110 MMHG | DIASTOLIC BLOOD PRESSURE: 58 MMHG

## 2018-10-13 VITALS — SYSTOLIC BLOOD PRESSURE: 87 MMHG | DIASTOLIC BLOOD PRESSURE: 58 MMHG

## 2018-10-13 VITALS — DIASTOLIC BLOOD PRESSURE: 55 MMHG | SYSTOLIC BLOOD PRESSURE: 111 MMHG

## 2018-10-13 VITALS — DIASTOLIC BLOOD PRESSURE: 53 MMHG | SYSTOLIC BLOOD PRESSURE: 103 MMHG

## 2018-10-13 VITALS — DIASTOLIC BLOOD PRESSURE: 46 MMHG | SYSTOLIC BLOOD PRESSURE: 90 MMHG

## 2018-10-13 VITALS — DIASTOLIC BLOOD PRESSURE: 57 MMHG | SYSTOLIC BLOOD PRESSURE: 114 MMHG

## 2018-10-13 VITALS — SYSTOLIC BLOOD PRESSURE: 87 MMHG | DIASTOLIC BLOOD PRESSURE: 38 MMHG

## 2018-10-13 VITALS — DIASTOLIC BLOOD PRESSURE: 67 MMHG | SYSTOLIC BLOOD PRESSURE: 139 MMHG

## 2018-10-13 VITALS — SYSTOLIC BLOOD PRESSURE: 116 MMHG | DIASTOLIC BLOOD PRESSURE: 57 MMHG

## 2018-10-13 VITALS — SYSTOLIC BLOOD PRESSURE: 116 MMHG | DIASTOLIC BLOOD PRESSURE: 58 MMHG

## 2018-10-13 VITALS — DIASTOLIC BLOOD PRESSURE: 57 MMHG | SYSTOLIC BLOOD PRESSURE: 131 MMHG

## 2018-10-13 VITALS — SYSTOLIC BLOOD PRESSURE: 110 MMHG | DIASTOLIC BLOOD PRESSURE: 54 MMHG

## 2018-10-13 VITALS — SYSTOLIC BLOOD PRESSURE: 102 MMHG | DIASTOLIC BLOOD PRESSURE: 55 MMHG

## 2018-10-13 VITALS — DIASTOLIC BLOOD PRESSURE: 49 MMHG | SYSTOLIC BLOOD PRESSURE: 96 MMHG

## 2018-10-13 VITALS — DIASTOLIC BLOOD PRESSURE: 56 MMHG | SYSTOLIC BLOOD PRESSURE: 112 MMHG

## 2018-10-13 VITALS — SYSTOLIC BLOOD PRESSURE: 88 MMHG | DIASTOLIC BLOOD PRESSURE: 47 MMHG

## 2018-10-13 VITALS — SYSTOLIC BLOOD PRESSURE: 115 MMHG | DIASTOLIC BLOOD PRESSURE: 35 MMHG

## 2018-10-13 LAB
BASE EXCESS BLDA CALC-SCNC: -2.6 MMOL/L
BASOPHILS # BLD AUTO: 0 /CMM (ref 0–0.2)
BASOPHILS NFR BLD AUTO: 0.1 % (ref 0–2)
BUN SERPL-MCNC: 47 MG/DL (ref 7–18)
BUN SERPL-MCNC: 54 MG/DL (ref 7–18)
CALCIUM SERPL-MCNC: 10 MG/DL (ref 8.5–10.1)
CALCIUM SERPL-MCNC: 9 MG/DL (ref 8.5–10.1)
CHLORIDE SERPL-SCNC: 92 MMOL/L (ref 98–107)
CHLORIDE SERPL-SCNC: 96 MMOL/L (ref 98–107)
CO2 SERPL-SCNC: 22 MMOL/L (ref 21–32)
CO2 SERPL-SCNC: 24 MMOL/L (ref 21–32)
CREAT SERPL-MCNC: 2.3 MG/DL (ref 0.6–1.3)
CREAT SERPL-MCNC: 2.5 MG/DL (ref 0.6–1.3)
DO-HGB MFR BLDA: 569.4 MMHG
EOSINOPHIL NFR BLD AUTO: 2.8 % (ref 0–6)
EOSINOPHIL NFR BLD MANUAL: 1 % (ref 0–4)
GLUCOSE SERPL-MCNC: 109 MG/DL (ref 74–106)
GLUCOSE SERPL-MCNC: 131 MG/DL (ref 74–106)
HCT VFR BLD AUTO: 22 % (ref 39–51)
HGB BLD-MCNC: 7.1 G/DL (ref 13.5–17.5)
INHALED O2 CONCENTRATION: 100 %
INTRINSIC PEEP RESPIRATORY: 0 CM H2O
LYMPHOCYTES NFR BLD AUTO: 1 /CMM (ref 0.8–4.8)
LYMPHOCYTES NFR BLD AUTO: 4.1 % (ref 20–44)
LYMPHOCYTES NFR BLD MANUAL: 1 % (ref 16–48)
MAGNESIUM SERPL-MCNC: 1.7 MG/DL (ref 1.8–2.4)
MAGNESIUM SERPL-MCNC: 1.8 MG/DL (ref 1.8–2.4)
MCHC RBC AUTO-ENTMCNC: 32 G/DL (ref 31–36)
MCV RBC AUTO: 99 FL (ref 80–96)
MONOCYTES NFR BLD AUTO: 0.6 /CMM (ref 0.1–1.3)
MONOCYTES NFR BLD AUTO: 2.5 % (ref 2–12)
MONOCYTES NFR BLD MANUAL: 5 % (ref 0–11)
NEUTROPHILS # BLD AUTO: 21.3 /CMM (ref 1.8–8.9)
NEUTROPHILS NFR BLD AUTO: 90.5 % (ref 43–81)
NEUTS BAND NFR BLD MANUAL: 14 % (ref 0–5)
NEUTS SEG NFR BLD MANUAL: 79 % (ref 42–76)
PCO2 TEMP ADJ BLDA: 45 MMHG (ref 35–45)
PEEP SETTING VENT: 550 ML
PH TEMP ADJ BLDA: 7.33 [PH] (ref 7.35–7.45)
PHOSPHATE SERPL-MCNC: 2.6 MG/DL (ref 2.5–4.9)
PHOSPHATE SERPL-MCNC: 3.3 MG/DL (ref 2.5–4.9)
PLATELET # BLD AUTO: 24 /CMM (ref 150–450)
PO2 TEMP ADJ BLDA: 98.6 MMHG (ref 75–100)
POTASSIUM SERPL-SCNC: 3 MMOL/L (ref 3.5–5.1)
POTASSIUM SERPL-SCNC: 3.3 MMOL/L (ref 3.5–5.1)
RBC # BLD AUTO: 2.25 MIL/UL (ref 4.5–6)
RDW COEFFICIENT OF VARIATION: 27.5 (ref 11.5–15)
SAO2 % BLDA: 96.9 % (ref 92–98.5)
SET RATE, BG: 24
SODIUM SERPL-SCNC: 125 MMOL/L (ref 136–145)
SODIUM SERPL-SCNC: 129 MMOL/L (ref 136–145)
TROPONIN I SERPL-MCNC: 0.26 NG/ML (ref 0–0.06)
WBC NRBC COR # BLD AUTO: 23.5 K/UL (ref 4.3–11)

## 2018-10-13 PROCEDURE — 5A1D70Z PERFORMANCE OF URINARY FILTRATION, INTERMITTENT, LESS THAN 6 HOURS PER DAY: ICD-10-PCS | Performed by: INTERNAL MEDICINE

## 2018-10-13 RX ADMIN — SODIUM CHLORIDE SCH MG: 9 INJECTION, SOLUTION INTRAVENOUS at 08:39

## 2018-10-13 RX ADMIN — DEXTROSE MONOHYDRATE PRN MLS/HR: 50 INJECTION, SOLUTION INTRAVENOUS at 16:46

## 2018-10-13 RX ADMIN — Medication SCH EACH: at 08:39

## 2018-10-13 RX ADMIN — Medication SCH EACH: at 19:57

## 2018-10-13 RX ADMIN — Medication SCH MLS/HR: at 00:36

## 2018-10-13 RX ADMIN — DEXTROSE MONOHYDRATE PRN MLS/HR: 50 INJECTION, SOLUTION INTRAVENOUS at 21:48

## 2018-10-13 RX ADMIN — Medication SCH MLS/HR: at 17:19

## 2018-10-13 RX ADMIN — TOBRAMYCIN AND DEXAMETHASONE SCH DROP: 3; 1 SUSPENSION/ DROPS OPHTHALMIC at 08:51

## 2018-10-13 RX ADMIN — Medication SCH EACH: at 17:22

## 2018-10-13 RX ADMIN — VANCOMYCIN HYDROCHLORIDE SCH MG: 125 CAPSULE ORAL at 05:35

## 2018-10-13 RX ADMIN — DEXTROSE AND SODIUM CHLORIDE PRN MLS/HR: 5; 900 INJECTION, SOLUTION INTRAVENOUS at 06:19

## 2018-10-13 RX ADMIN — Medication SCH EACH: at 05:24

## 2018-10-13 RX ADMIN — Medication SCH EACH: at 01:08

## 2018-10-13 RX ADMIN — NYSTATIN SCH APPLIC: 100000 CREAM TOPICAL at 02:48

## 2018-10-13 RX ADMIN — MEROPENEM SCH MLS/HR: 500 INJECTION INTRAVENOUS at 16:45

## 2018-10-13 RX ADMIN — Medication SCH EACH: at 09:02

## 2018-10-13 RX ADMIN — DEXTROSE MONOHYDRATE PRN ML: 500 INJECTION PARENTERAL at 12:41

## 2018-10-13 RX ADMIN — DEXTROSE MONOHYDRATE PRN MLS/HR: 50 INJECTION, SOLUTION INTRAVENOUS at 08:42

## 2018-10-13 RX ADMIN — Medication SCH EACH: at 12:41

## 2018-10-13 RX ADMIN — DEXTROSE AND SODIUM CHLORIDE PRN MLS/HR: 5; 900 INJECTION, SOLUTION INTRAVENOUS at 16:45

## 2018-10-13 RX ADMIN — Medication SCH EACH: at 20:30

## 2018-10-13 RX ADMIN — LEVETIRACETAM SCH MG: 250 TABLET, FILM COATED ORAL at 08:40

## 2018-10-13 RX ADMIN — MUPIROCIN SCH GM: 20 OINTMENT TOPICAL at 08:51

## 2018-10-13 RX ADMIN — DEXTROSE MONOHYDRATE PRN MLS/HR: 50 INJECTION, SOLUTION INTRAVENOUS at 19:57

## 2018-10-13 RX ADMIN — DEXTROSE MONOHYDRATE PRN MLS/HR: 50 INJECTION, SOLUTION INTRAVENOUS at 00:27

## 2018-10-13 RX ADMIN — VANCOMYCIN HYDROCHLORIDE SCH MG: 125 CAPSULE ORAL at 23:31

## 2018-10-13 RX ADMIN — LEVOTHYROXINE SODIUM SCH MCG: 75 TABLET ORAL at 08:41

## 2018-10-13 RX ADMIN — NYSTATIN SCH APPLIC: 100000 CREAM TOPICAL at 14:54

## 2018-10-13 RX ADMIN — MEROPENEM SCH MLS/HR: 500 INJECTION INTRAVENOUS at 05:13

## 2018-10-13 RX ADMIN — Medication SCH MLS/HR: at 08:39

## 2018-10-13 RX ADMIN — MUPIROCIN SCH GM: 20 OINTMENT TOPICAL at 20:31

## 2018-10-13 RX ADMIN — TOBRAMYCIN AND DEXAMETHASONE SCH DROP: 3; 1 SUSPENSION/ DROPS OPHTHALMIC at 17:20

## 2018-10-13 RX ADMIN — VANCOMYCIN HYDROCHLORIDE SCH MG: 125 CAPSULE ORAL at 19:57

## 2018-10-13 RX ADMIN — VANCOMYCIN HYDROCHLORIDE SCH MG: 125 CAPSULE ORAL at 12:37

## 2018-10-13 RX ADMIN — DEXTROSE MONOHYDRATE PRN ML: 500 INJECTION PARENTERAL at 05:29

## 2018-10-13 RX ADMIN — LEVETIRACETAM SCH MG: 250 TABLET, FILM COATED ORAL at 19:58

## 2018-10-13 NOTE — NUR
RN NOTES

 

HD STARTED. NO RESPIRATORY DISTRESS NOTED. NO SOB NOTED. NO SIGNS OF PAIN NOTED. PT ON LEVO 
AT 20MCG/MIN. WILL MONITOR.

## 2018-10-13 NOTE — NUR
RECEIVED PT TRACHED PORTEX 9 ON VENT WITH NOTED SETTINGS OF AC 24,550,PEEP 0, 75%. PT 
TOLERATING VENT SETTINGS.   PT IS OBTUNDED.  SUCTIONED COPIOUS AMOUNT OF  YELLOW THICK  
SECRETIONS. VENT ALARMS SET AND AUDIBLE. TRACH PATENT AND SECURED, CUFF MLT. VENT PLUGGED 
INTO RED OUTLET.  NO RESPIRATORY DISTRESS NOTED AT THIS TIME .WILL CONTINUE TO MONITOR.

## 2018-10-13 NOTE — NUR
RN NOTES



0800 SEEN AND EXAMINED BY DR RIVERA. PT VENT/TRACH PT. NO RESPIRATORY DISTRESS NOTED. STILL 
ON LEVO, TITRATING ACCORDINGLY. LABS PENDING. 



0900 SEEN AND EXAMINED BY DR OAKLEY. LAST HD YESTERDAY, 3L OUT. MD ORDERED HD TODAY. 



0940 SEEN AND EXAMINED BY DR REDMOND. PT REMAINS ON LEVO, TITRATING. BP CLOSELY MONITOR. 
LABS PENDING. GT CLAMPED. NPO EXCEPT MEDS PER GI. WILL MONITOR.

## 2018-10-13 NOTE — NUR
RN CLOSING NOTES



NO SIGNIFICANT CHANGE NOTED. PT REMAINS ON LEVO, TITRATED ACCORDINGLY. PT HEMODIALYSIS 
(ULTRA FILTRATION) ONGOING. PT TOLERATING PROCEDURE WELL. VS WNL. NO RESPIRATORY DISTRESS 
NOTED. NO SIGNS OF PAIN NOTED. UNABLE TO REPOSITION DURING HD. KEPT COMFORTABLE. TX PROVIDED 
AS ORDERED. BLE ELEVATED. WILL ENDORSE FOR CONTINUITY OF CARE.

## 2018-10-13 NOTE — NUR
RN INITIAL NOTES



RECEIVED PT OBTUNDED. TRACH IN PLACE, TOLERATING VENT WELL. NO RESPIRATORY DISTRESS NOTED. 
NO HOB ELEVATED. NO SIGNS OF PAIN NOTED. PT ON LEVO AT 30MCG/MIN. WILL MONITOR BP CLOSELY. 
IVF INFUSING. GTUBE CLAMPED. BLE ELEVATED. PT COMFORTABLE. WILL MONITOR

## 2018-10-13 NOTE — NUR
ICU/RN

REMAINS ON LEVOPHED DRIP,NOW AT 30MCG/MIN.MONITOR SHOWS NSR.CIDIFF AND MRSA ISOLATION 
OBSERVED.

## 2018-10-13 NOTE — NUR
ICU RN. INITIAL ASSESSMENT. RECEIVED THE PT REST ON THE BED. TRACH TO VENT CONNECTED. PT IS 
OBTUNDED. PORTEX#8,AC 24,,FIO2 75%. SAT 97%. NO ACUTE DISTRESS NOTED. CARDIAC MONITOR 
SHOWING NSR. IV LT UPPER ARM AV SHUNT,LT SUBCLAVIAN HD CATH,RT UPPER ARM MID LINE . IVF D5NS 
75ML/H,LEVOPHED 26MCG/MIN. PT IS NPO. GT INTACT. HD RUNNING. WILL CONTINUE TO MONITOR 
VITALS.

## 2018-10-14 VITALS — DIASTOLIC BLOOD PRESSURE: 63 MMHG | SYSTOLIC BLOOD PRESSURE: 125 MMHG

## 2018-10-14 VITALS — SYSTOLIC BLOOD PRESSURE: 125 MMHG | DIASTOLIC BLOOD PRESSURE: 60 MMHG

## 2018-10-14 VITALS — SYSTOLIC BLOOD PRESSURE: 126 MMHG | DIASTOLIC BLOOD PRESSURE: 65 MMHG

## 2018-10-14 VITALS — SYSTOLIC BLOOD PRESSURE: 121 MMHG | DIASTOLIC BLOOD PRESSURE: 64 MMHG

## 2018-10-14 VITALS — DIASTOLIC BLOOD PRESSURE: 62 MMHG | SYSTOLIC BLOOD PRESSURE: 122 MMHG

## 2018-10-14 VITALS — DIASTOLIC BLOOD PRESSURE: 52 MMHG | SYSTOLIC BLOOD PRESSURE: 98 MMHG

## 2018-10-14 VITALS — DIASTOLIC BLOOD PRESSURE: 60 MMHG | SYSTOLIC BLOOD PRESSURE: 117 MMHG

## 2018-10-14 VITALS — SYSTOLIC BLOOD PRESSURE: 109 MMHG | DIASTOLIC BLOOD PRESSURE: 63 MMHG

## 2018-10-14 VITALS — DIASTOLIC BLOOD PRESSURE: 68 MMHG | SYSTOLIC BLOOD PRESSURE: 89 MMHG

## 2018-10-14 VITALS — SYSTOLIC BLOOD PRESSURE: 124 MMHG | DIASTOLIC BLOOD PRESSURE: 81 MMHG

## 2018-10-14 VITALS — DIASTOLIC BLOOD PRESSURE: 75 MMHG | SYSTOLIC BLOOD PRESSURE: 139 MMHG

## 2018-10-14 VITALS — DIASTOLIC BLOOD PRESSURE: 68 MMHG | SYSTOLIC BLOOD PRESSURE: 122 MMHG

## 2018-10-14 VITALS — DIASTOLIC BLOOD PRESSURE: 54 MMHG | SYSTOLIC BLOOD PRESSURE: 110 MMHG

## 2018-10-14 VITALS — SYSTOLIC BLOOD PRESSURE: 125 MMHG | DIASTOLIC BLOOD PRESSURE: 70 MMHG

## 2018-10-14 VITALS — SYSTOLIC BLOOD PRESSURE: 109 MMHG | DIASTOLIC BLOOD PRESSURE: 56 MMHG

## 2018-10-14 VITALS — SYSTOLIC BLOOD PRESSURE: 118 MMHG | DIASTOLIC BLOOD PRESSURE: 52 MMHG

## 2018-10-14 VITALS — DIASTOLIC BLOOD PRESSURE: 60 MMHG | SYSTOLIC BLOOD PRESSURE: 115 MMHG

## 2018-10-14 VITALS — SYSTOLIC BLOOD PRESSURE: 99 MMHG | DIASTOLIC BLOOD PRESSURE: 64 MMHG

## 2018-10-14 VITALS — DIASTOLIC BLOOD PRESSURE: 61 MMHG | SYSTOLIC BLOOD PRESSURE: 98 MMHG

## 2018-10-14 VITALS — DIASTOLIC BLOOD PRESSURE: 73 MMHG | SYSTOLIC BLOOD PRESSURE: 94 MMHG

## 2018-10-14 VITALS — DIASTOLIC BLOOD PRESSURE: 61 MMHG | SYSTOLIC BLOOD PRESSURE: 102 MMHG

## 2018-10-14 VITALS — SYSTOLIC BLOOD PRESSURE: 106 MMHG | DIASTOLIC BLOOD PRESSURE: 55 MMHG

## 2018-10-14 VITALS — DIASTOLIC BLOOD PRESSURE: 55 MMHG | SYSTOLIC BLOOD PRESSURE: 122 MMHG

## 2018-10-14 VITALS — DIASTOLIC BLOOD PRESSURE: 82 MMHG | SYSTOLIC BLOOD PRESSURE: 112 MMHG

## 2018-10-14 VITALS — DIASTOLIC BLOOD PRESSURE: 72 MMHG | SYSTOLIC BLOOD PRESSURE: 134 MMHG

## 2018-10-14 VITALS — DIASTOLIC BLOOD PRESSURE: 53 MMHG | SYSTOLIC BLOOD PRESSURE: 112 MMHG

## 2018-10-14 VITALS — SYSTOLIC BLOOD PRESSURE: 92 MMHG | DIASTOLIC BLOOD PRESSURE: 59 MMHG

## 2018-10-14 VITALS — SYSTOLIC BLOOD PRESSURE: 118 MMHG | DIASTOLIC BLOOD PRESSURE: 68 MMHG

## 2018-10-14 VITALS — SYSTOLIC BLOOD PRESSURE: 120 MMHG | DIASTOLIC BLOOD PRESSURE: 68 MMHG

## 2018-10-14 VITALS — SYSTOLIC BLOOD PRESSURE: 127 MMHG | DIASTOLIC BLOOD PRESSURE: 67 MMHG

## 2018-10-14 VITALS — SYSTOLIC BLOOD PRESSURE: 99 MMHG | DIASTOLIC BLOOD PRESSURE: 49 MMHG

## 2018-10-14 VITALS — SYSTOLIC BLOOD PRESSURE: 115 MMHG | DIASTOLIC BLOOD PRESSURE: 58 MMHG

## 2018-10-14 VITALS — SYSTOLIC BLOOD PRESSURE: 135 MMHG | DIASTOLIC BLOOD PRESSURE: 74 MMHG

## 2018-10-14 VITALS — DIASTOLIC BLOOD PRESSURE: 60 MMHG | SYSTOLIC BLOOD PRESSURE: 122 MMHG

## 2018-10-14 VITALS — DIASTOLIC BLOOD PRESSURE: 51 MMHG | SYSTOLIC BLOOD PRESSURE: 111 MMHG

## 2018-10-14 VITALS — DIASTOLIC BLOOD PRESSURE: 68 MMHG | SYSTOLIC BLOOD PRESSURE: 114 MMHG

## 2018-10-14 VITALS — DIASTOLIC BLOOD PRESSURE: 57 MMHG | SYSTOLIC BLOOD PRESSURE: 116 MMHG

## 2018-10-14 VITALS — SYSTOLIC BLOOD PRESSURE: 100 MMHG | DIASTOLIC BLOOD PRESSURE: 57 MMHG

## 2018-10-14 VITALS — DIASTOLIC BLOOD PRESSURE: 68 MMHG | SYSTOLIC BLOOD PRESSURE: 110 MMHG

## 2018-10-14 VITALS — DIASTOLIC BLOOD PRESSURE: 79 MMHG | SYSTOLIC BLOOD PRESSURE: 145 MMHG

## 2018-10-14 VITALS — DIASTOLIC BLOOD PRESSURE: 58 MMHG | SYSTOLIC BLOOD PRESSURE: 119 MMHG

## 2018-10-14 VITALS — DIASTOLIC BLOOD PRESSURE: 57 MMHG | SYSTOLIC BLOOD PRESSURE: 126 MMHG

## 2018-10-14 VITALS — SYSTOLIC BLOOD PRESSURE: 108 MMHG | DIASTOLIC BLOOD PRESSURE: 81 MMHG

## 2018-10-14 VITALS — DIASTOLIC BLOOD PRESSURE: 90 MMHG | SYSTOLIC BLOOD PRESSURE: 118 MMHG

## 2018-10-14 VITALS — DIASTOLIC BLOOD PRESSURE: 70 MMHG | SYSTOLIC BLOOD PRESSURE: 117 MMHG

## 2018-10-14 VITALS — DIASTOLIC BLOOD PRESSURE: 43 MMHG | SYSTOLIC BLOOD PRESSURE: 75 MMHG

## 2018-10-14 VITALS — DIASTOLIC BLOOD PRESSURE: 72 MMHG | SYSTOLIC BLOOD PRESSURE: 120 MMHG

## 2018-10-14 VITALS — SYSTOLIC BLOOD PRESSURE: 148 MMHG | DIASTOLIC BLOOD PRESSURE: 76 MMHG

## 2018-10-14 VITALS — DIASTOLIC BLOOD PRESSURE: 71 MMHG | SYSTOLIC BLOOD PRESSURE: 121 MMHG

## 2018-10-14 VITALS — DIASTOLIC BLOOD PRESSURE: 64 MMHG | SYSTOLIC BLOOD PRESSURE: 144 MMHG

## 2018-10-14 VITALS — DIASTOLIC BLOOD PRESSURE: 54 MMHG | SYSTOLIC BLOOD PRESSURE: 87 MMHG

## 2018-10-14 VITALS — DIASTOLIC BLOOD PRESSURE: 94 MMHG | SYSTOLIC BLOOD PRESSURE: 126 MMHG

## 2018-10-14 VITALS — SYSTOLIC BLOOD PRESSURE: 116 MMHG | DIASTOLIC BLOOD PRESSURE: 57 MMHG

## 2018-10-14 VITALS — SYSTOLIC BLOOD PRESSURE: 71 MMHG | DIASTOLIC BLOOD PRESSURE: 29 MMHG

## 2018-10-14 VITALS — DIASTOLIC BLOOD PRESSURE: 40 MMHG | SYSTOLIC BLOOD PRESSURE: 73 MMHG

## 2018-10-14 VITALS — DIASTOLIC BLOOD PRESSURE: 53 MMHG | SYSTOLIC BLOOD PRESSURE: 106 MMHG

## 2018-10-14 VITALS — DIASTOLIC BLOOD PRESSURE: 65 MMHG | SYSTOLIC BLOOD PRESSURE: 136 MMHG

## 2018-10-14 VITALS — DIASTOLIC BLOOD PRESSURE: 72 MMHG | SYSTOLIC BLOOD PRESSURE: 104 MMHG

## 2018-10-14 VITALS — DIASTOLIC BLOOD PRESSURE: 76 MMHG | SYSTOLIC BLOOD PRESSURE: 103 MMHG

## 2018-10-14 VITALS — DIASTOLIC BLOOD PRESSURE: 59 MMHG | SYSTOLIC BLOOD PRESSURE: 122 MMHG

## 2018-10-14 VITALS — SYSTOLIC BLOOD PRESSURE: 113 MMHG | DIASTOLIC BLOOD PRESSURE: 70 MMHG

## 2018-10-14 VITALS — DIASTOLIC BLOOD PRESSURE: 86 MMHG | SYSTOLIC BLOOD PRESSURE: 141 MMHG

## 2018-10-14 VITALS — SYSTOLIC BLOOD PRESSURE: 108 MMHG | DIASTOLIC BLOOD PRESSURE: 52 MMHG

## 2018-10-14 VITALS — DIASTOLIC BLOOD PRESSURE: 61 MMHG | SYSTOLIC BLOOD PRESSURE: 117 MMHG

## 2018-10-14 VITALS — SYSTOLIC BLOOD PRESSURE: 95 MMHG | DIASTOLIC BLOOD PRESSURE: 77 MMHG

## 2018-10-14 VITALS — SYSTOLIC BLOOD PRESSURE: 138 MMHG | DIASTOLIC BLOOD PRESSURE: 77 MMHG

## 2018-10-14 VITALS — SYSTOLIC BLOOD PRESSURE: 119 MMHG | DIASTOLIC BLOOD PRESSURE: 59 MMHG

## 2018-10-14 VITALS — DIASTOLIC BLOOD PRESSURE: 56 MMHG | SYSTOLIC BLOOD PRESSURE: 109 MMHG

## 2018-10-14 VITALS — DIASTOLIC BLOOD PRESSURE: 54 MMHG | SYSTOLIC BLOOD PRESSURE: 105 MMHG

## 2018-10-14 VITALS — SYSTOLIC BLOOD PRESSURE: 117 MMHG | DIASTOLIC BLOOD PRESSURE: 81 MMHG

## 2018-10-14 VITALS — SYSTOLIC BLOOD PRESSURE: 146 MMHG | DIASTOLIC BLOOD PRESSURE: 69 MMHG

## 2018-10-14 VITALS — DIASTOLIC BLOOD PRESSURE: 71 MMHG | SYSTOLIC BLOOD PRESSURE: 131 MMHG

## 2018-10-14 VITALS — DIASTOLIC BLOOD PRESSURE: 74 MMHG | SYSTOLIC BLOOD PRESSURE: 129 MMHG

## 2018-10-14 VITALS — DIASTOLIC BLOOD PRESSURE: 62 MMHG | SYSTOLIC BLOOD PRESSURE: 110 MMHG

## 2018-10-14 VITALS — SYSTOLIC BLOOD PRESSURE: 108 MMHG | DIASTOLIC BLOOD PRESSURE: 70 MMHG

## 2018-10-14 VITALS — SYSTOLIC BLOOD PRESSURE: 102 MMHG | DIASTOLIC BLOOD PRESSURE: 66 MMHG

## 2018-10-14 VITALS — SYSTOLIC BLOOD PRESSURE: 108 MMHG | DIASTOLIC BLOOD PRESSURE: 76 MMHG

## 2018-10-14 VITALS — DIASTOLIC BLOOD PRESSURE: 63 MMHG | SYSTOLIC BLOOD PRESSURE: 123 MMHG

## 2018-10-14 VITALS — SYSTOLIC BLOOD PRESSURE: 119 MMHG | DIASTOLIC BLOOD PRESSURE: 78 MMHG

## 2018-10-14 VITALS — DIASTOLIC BLOOD PRESSURE: 72 MMHG | SYSTOLIC BLOOD PRESSURE: 128 MMHG

## 2018-10-14 VITALS — DIASTOLIC BLOOD PRESSURE: 67 MMHG | SYSTOLIC BLOOD PRESSURE: 126 MMHG

## 2018-10-14 VITALS — DIASTOLIC BLOOD PRESSURE: 53 MMHG | SYSTOLIC BLOOD PRESSURE: 107 MMHG

## 2018-10-14 VITALS — DIASTOLIC BLOOD PRESSURE: 78 MMHG | SYSTOLIC BLOOD PRESSURE: 131 MMHG

## 2018-10-14 VITALS — DIASTOLIC BLOOD PRESSURE: 68 MMHG | SYSTOLIC BLOOD PRESSURE: 129 MMHG

## 2018-10-14 VITALS — DIASTOLIC BLOOD PRESSURE: 92 MMHG | SYSTOLIC BLOOD PRESSURE: 122 MMHG

## 2018-10-14 VITALS — SYSTOLIC BLOOD PRESSURE: 144 MMHG | DIASTOLIC BLOOD PRESSURE: 71 MMHG

## 2018-10-14 VITALS — SYSTOLIC BLOOD PRESSURE: 102 MMHG | DIASTOLIC BLOOD PRESSURE: 46 MMHG

## 2018-10-14 VITALS — DIASTOLIC BLOOD PRESSURE: 68 MMHG | SYSTOLIC BLOOD PRESSURE: 136 MMHG

## 2018-10-14 VITALS — SYSTOLIC BLOOD PRESSURE: 114 MMHG | DIASTOLIC BLOOD PRESSURE: 63 MMHG

## 2018-10-14 VITALS — SYSTOLIC BLOOD PRESSURE: 127 MMHG | DIASTOLIC BLOOD PRESSURE: 65 MMHG

## 2018-10-14 VITALS — DIASTOLIC BLOOD PRESSURE: 62 MMHG | SYSTOLIC BLOOD PRESSURE: 123 MMHG

## 2018-10-14 VITALS — DIASTOLIC BLOOD PRESSURE: 69 MMHG | SYSTOLIC BLOOD PRESSURE: 145 MMHG

## 2018-10-14 VITALS — SYSTOLIC BLOOD PRESSURE: 128 MMHG | DIASTOLIC BLOOD PRESSURE: 66 MMHG

## 2018-10-14 VITALS — SYSTOLIC BLOOD PRESSURE: 106 MMHG | DIASTOLIC BLOOD PRESSURE: 66 MMHG

## 2018-10-14 VITALS — SYSTOLIC BLOOD PRESSURE: 105 MMHG | DIASTOLIC BLOOD PRESSURE: 60 MMHG

## 2018-10-14 VITALS — SYSTOLIC BLOOD PRESSURE: 134 MMHG | DIASTOLIC BLOOD PRESSURE: 72 MMHG

## 2018-10-14 LAB
BASE EXCESS BLDA CALC-SCNC: -4.1 MMOL/L
BASOPHILS # BLD AUTO: 0 /CMM (ref 0–0.2)
BASOPHILS NFR BLD AUTO: 0.1 % (ref 0–2)
BUN SERPL-MCNC: 57 MG/DL (ref 7–18)
CALCIUM SERPL-MCNC: 10.1 MG/DL (ref 8.5–10.1)
CHLORIDE SERPL-SCNC: 91 MMOL/L (ref 98–107)
CO2 SERPL-SCNC: 20 MMOL/L (ref 21–32)
CREAT SERPL-MCNC: 2.5 MG/DL (ref 0.6–1.3)
DO-HGB MFR BLDA: 244.3 MMHG
EOSINOPHIL NFR BLD AUTO: 1.9 % (ref 0–6)
EOSINOPHIL NFR BLD MANUAL: 2 % (ref 0–4)
GLUCOSE SERPL-MCNC: 108 MG/DL (ref 74–106)
HCT VFR BLD AUTO: 24 % (ref 39–51)
HGB BLD-MCNC: 7.7 G/DL (ref 13.5–17.5)
INHALED O2 CONCENTRATION: 50 %
LYMPHOCYTES NFR BLD AUTO: 1.7 /CMM (ref 0.8–4.8)
LYMPHOCYTES NFR BLD AUTO: 5.5 % (ref 20–44)
LYMPHOCYTES NFR BLD MANUAL: 3 % (ref 16–48)
MCHC RBC AUTO-ENTMCNC: 32 G/DL (ref 31–36)
MCV RBC AUTO: 99 FL (ref 80–96)
MONOCYTES NFR BLD AUTO: 1.3 /CMM (ref 0.1–1.3)
MONOCYTES NFR BLD AUTO: 4.3 % (ref 2–12)
MONOCYTES NFR BLD MANUAL: 6 % (ref 0–11)
NEUTROPHILS # BLD AUTO: 26.8 /CMM (ref 1.8–8.9)
NEUTROPHILS NFR BLD AUTO: 88.2 % (ref 43–81)
NEUTS BAND NFR BLD MANUAL: 2 % (ref 0–5)
NEUTS SEG NFR BLD MANUAL: 87 % (ref 42–76)
PCO2 TEMP ADJ BLDA: 36.7 MMHG (ref 35–45)
PH TEMP ADJ BLDA: 7.37 [PH] (ref 7.35–7.45)
PLATELET # BLD AUTO: 26 /CMM (ref 150–450)
PO2 TEMP ADJ BLDA: 70.9 MMHG (ref 75–100)
POTASSIUM SERPL-SCNC: 3.2 MMOL/L (ref 3.5–5.1)
RBC # BLD AUTO: 2.46 MIL/UL (ref 4.5–6)
RDW COEFFICIENT OF VARIATION: 27.2 (ref 11.5–15)
SAO2 % BLDA: 93.9 % (ref 92–98.5)
SODIUM SERPL-SCNC: 122 MMOL/L (ref 136–145)
VENTILATION MODE VENT: (no result)
WBC NRBC COR # BLD AUTO: 30.4 K/UL (ref 4.3–11)

## 2018-10-14 PROCEDURE — 5A1D70Z PERFORMANCE OF URINARY FILTRATION, INTERMITTENT, LESS THAN 6 HOURS PER DAY: ICD-10-PCS | Performed by: INTERNAL MEDICINE

## 2018-10-14 RX ADMIN — MEROPENEM SCH MLS/HR: 500 INJECTION INTRAVENOUS at 18:07

## 2018-10-14 RX ADMIN — Medication SCH EACH: at 08:09

## 2018-10-14 RX ADMIN — LEVOTHYROXINE SODIUM SCH MCG: 75 TABLET ORAL at 08:10

## 2018-10-14 RX ADMIN — POTASSIUM CHLORIDE SCH MEQ: 1.5 POWDER, FOR SOLUTION ORAL at 11:00

## 2018-10-14 RX ADMIN — Medication SCH EACH: at 17:13

## 2018-10-14 RX ADMIN — VANCOMYCIN HYDROCHLORIDE SCH MG: 125 CAPSULE ORAL at 17:07

## 2018-10-14 RX ADMIN — MUPIROCIN SCH GM: 20 OINTMENT TOPICAL at 08:21

## 2018-10-14 RX ADMIN — MUPIROCIN SCH GM: 20 OINTMENT TOPICAL at 21:27

## 2018-10-14 RX ADMIN — VANCOMYCIN HYDROCHLORIDE SCH MG: 125 CAPSULE ORAL at 05:42

## 2018-10-14 RX ADMIN — DEXTROSE MONOHYDRATE PRN MLS/HR: 50 INJECTION, SOLUTION INTRAVENOUS at 17:02

## 2018-10-14 RX ADMIN — DEXTROSE MONOHYDRATE PRN MLS/HR: 50 INJECTION, SOLUTION INTRAVENOUS at 17:01

## 2018-10-14 RX ADMIN — TOBRAMYCIN AND DEXAMETHASONE SCH DROP: 3; 1 SUSPENSION/ DROPS OPHTHALMIC at 08:21

## 2018-10-14 RX ADMIN — DEXTROSE AND SODIUM CHLORIDE PRN MLS/HR: 5; 900 INJECTION, SOLUTION INTRAVENOUS at 17:14

## 2018-10-14 RX ADMIN — DEXTROSE AND SODIUM CHLORIDE PRN MLS/HR: 5; 900 INJECTION, SOLUTION INTRAVENOUS at 05:42

## 2018-10-14 RX ADMIN — POTASSIUM CHLORIDE SCH MEQ: 1.5 POWDER, FOR SOLUTION ORAL at 10:10

## 2018-10-14 RX ADMIN — DEXTROSE MONOHYDRATE PRN MLS/HR: 50 INJECTION, SOLUTION INTRAVENOUS at 00:28

## 2018-10-14 RX ADMIN — Medication SCH EACH: at 00:43

## 2018-10-14 RX ADMIN — MEROPENEM SCH MLS/HR: 500 INJECTION INTRAVENOUS at 04:36

## 2018-10-14 RX ADMIN — Medication SCH MLS/HR: at 18:39

## 2018-10-14 RX ADMIN — SODIUM CHLORIDE SCH MG: 9 INJECTION, SOLUTION INTRAVENOUS at 08:10

## 2018-10-14 RX ADMIN — AMIODARONE HYDROCHLORIDE SCH MG: 200 TABLET ORAL at 17:06

## 2018-10-14 RX ADMIN — Medication SCH EACH: at 04:36

## 2018-10-14 RX ADMIN — Medication SCH MLS/HR: at 08:15

## 2018-10-14 RX ADMIN — VANCOMYCIN HYDROCHLORIDE SCH MG: 125 CAPSULE ORAL at 12:02

## 2018-10-14 RX ADMIN — TOBRAMYCIN AND DEXAMETHASONE SCH DROP: 3; 1 SUSPENSION/ DROPS OPHTHALMIC at 17:15

## 2018-10-14 RX ADMIN — Medication SCH EACH: at 12:15

## 2018-10-14 RX ADMIN — Medication SCH EACH: at 17:06

## 2018-10-14 RX ADMIN — Medication SCH MLS/HR: at 00:20

## 2018-10-14 RX ADMIN — NYSTATIN SCH APPLIC: 100000 CREAM TOPICAL at 01:03

## 2018-10-14 RX ADMIN — AMIODARONE HYDROCHLORIDE SCH MG: 200 TABLET ORAL at 14:56

## 2018-10-14 RX ADMIN — NYSTATIN SCH APPLIC: 100000 CREAM TOPICAL at 15:00

## 2018-10-14 RX ADMIN — Medication SCH EACH: at 08:29

## 2018-10-14 RX ADMIN — Medication SCH EACH: at 21:27

## 2018-10-14 RX ADMIN — POTASSIUM CHLORIDE SCH MEQ: 1.5 POWDER, FOR SOLUTION ORAL at 10:02

## 2018-10-14 RX ADMIN — LEVETIRACETAM SCH MG: 250 TABLET, FILM COATED ORAL at 17:06

## 2018-10-14 RX ADMIN — LEVETIRACETAM SCH MG: 250 TABLET, FILM COATED ORAL at 08:10

## 2018-10-14 RX ADMIN — DEXTROSE MONOHYDRATE PRN MLS/HR: 50 INJECTION, SOLUTION INTRAVENOUS at 08:06

## 2018-10-14 NOTE — NUR
RT

PATIENT REC'D TRACHED ON [facility] VENT WITH ORDERED SETTINGS. VENT ALARMS CHECKED + AUDIBLE. 
TRACH SECURE + IN PROPER POSITION. B/S DIM COARSE. PATIENT AIRWAY SUCTIONED AND PATENT WITH 
MOD AMT OF PALE PETERSEN SEMITHICK SECRETIONS. AMBU BAG AT HOB

-------------------------------------------------------------------------------

Addendum: 10/14/18 at 1112 by RIKKI HAWTHORNE RT

-------------------------------------------------------------------------------

Amended: Links added.

## 2018-10-14 NOTE — NUR
DR RIVERA UPDATED THAT PATIENT HAVING MORE FREQUENT 

VTACH RUNS. UPDATED WITH STAT EKG RESULTS. PER HIS ORDERS, START PATIENT ON BOLUS AMIODARONE 
AND CONTINUE WITH AMIODARONE DRIP, TITRATING DOWN PER PROTOCOL. VERBAL READBACK DONE

## 2018-10-14 NOTE — NUR
ICU RN.  NOTE. ORAL CARE, BED BATH GIVEN. LINEN CHANGED. REMAINING SAME VENT SETTING 
TOLERATED WELL. %. NO ACUTE DISTRES NOTED. CARDIAC MONITOR SHOWING S TACH WITH BBB. 
AND MULTIPLE  PVCS . GT CLAMPED. PT IS NPO. AFEBRILE. IV RT UPPER ARM MID LINE TRIPLE LUMEN. 
MICHAEL 40MCG/MIN. LEVOPHED 40MCG/MIN. HOB ELEVATED, TURN AND REPOSITION Q2H. LT HAND OLD    A V 
FISTULA.   NEW HD CATH LT SUBCLAVIAN. WILL CONTINUE TO MONITOR VITALS.

## 2018-10-14 NOTE — NUR
ICU RN

RCD PT W/DX SEPSIS,PNA; ISOLATION PRECAUTIONS FOR CDIFF/MRSA.

PT IS OBTUNDED ONLY RESPONDS TO PAINFUL STIMULI.

NSR ON MONITOR. LEVOPHED AT 18 MCG/MIN.

G TUBE CLAMPED.

ANURIC.

MULTIPLE SKIN ISSUES.

## 2018-10-14 NOTE — NUR
PER DR RIVERA, DISCONTINUE AMIODARONE GTT AND START PATIENT ON AMIODARONE 400 MG TID 

DR RIVERA NOTIFIED THAT NO PERIODS OF VTACH NOTED SINCE ADMINISTRATION OF AMIODARONE BOLUS

## 2018-10-14 NOTE — NUR
RECEIVED PT TRACHED PORTEX 9 ON VENT WITH NOTED SETTINGS OF AC 24,550,PEEP 0, 50%. PT 
TOLERATING VENT SETTINGS.   PT IS OBTUNDED, RESPONDS TO STIMULI WHEN SUCTIONED.   SUCTIONED 
COPIOUS AMOUNT OF  YELLOW THICK  SECRETIONS. VENT ALARMS SET AND AUDIBLE. TRACH PATENT AND 
SECURED, CUFF MLT. VENT PLUGGED INTO RED OUTLET.  NO RESPIRATORY DISTRESS NOTED AT THIS TIME 
.WILL CONTINUE TO MONITOR.

## 2018-10-14 NOTE — NUR
NO VTACH RUNS NOTED SINCE ADMINISTRATION OF AMIODARONE. PATIENT SINUS RHYTHM WITH WITH BBB 
WITH OCCASIONAL PVCS.

## 2018-10-15 VITALS — SYSTOLIC BLOOD PRESSURE: 114 MMHG | DIASTOLIC BLOOD PRESSURE: 61 MMHG

## 2018-10-15 VITALS — SYSTOLIC BLOOD PRESSURE: 108 MMHG | DIASTOLIC BLOOD PRESSURE: 62 MMHG

## 2018-10-15 VITALS — SYSTOLIC BLOOD PRESSURE: 107 MMHG | DIASTOLIC BLOOD PRESSURE: 65 MMHG

## 2018-10-15 VITALS — SYSTOLIC BLOOD PRESSURE: 99 MMHG | DIASTOLIC BLOOD PRESSURE: 66 MMHG

## 2018-10-15 VITALS — DIASTOLIC BLOOD PRESSURE: 73 MMHG | SYSTOLIC BLOOD PRESSURE: 116 MMHG

## 2018-10-15 VITALS — SYSTOLIC BLOOD PRESSURE: 106 MMHG | DIASTOLIC BLOOD PRESSURE: 63 MMHG

## 2018-10-15 VITALS — DIASTOLIC BLOOD PRESSURE: 53 MMHG | SYSTOLIC BLOOD PRESSURE: 100 MMHG

## 2018-10-15 VITALS — DIASTOLIC BLOOD PRESSURE: 54 MMHG | SYSTOLIC BLOOD PRESSURE: 101 MMHG

## 2018-10-15 VITALS — DIASTOLIC BLOOD PRESSURE: 46 MMHG | SYSTOLIC BLOOD PRESSURE: 95 MMHG

## 2018-10-15 VITALS — DIASTOLIC BLOOD PRESSURE: 74 MMHG | SYSTOLIC BLOOD PRESSURE: 133 MMHG

## 2018-10-15 VITALS — DIASTOLIC BLOOD PRESSURE: 57 MMHG | SYSTOLIC BLOOD PRESSURE: 110 MMHG

## 2018-10-15 VITALS — SYSTOLIC BLOOD PRESSURE: 111 MMHG | DIASTOLIC BLOOD PRESSURE: 57 MMHG

## 2018-10-15 VITALS — SYSTOLIC BLOOD PRESSURE: 88 MMHG | DIASTOLIC BLOOD PRESSURE: 63 MMHG

## 2018-10-15 VITALS — SYSTOLIC BLOOD PRESSURE: 101 MMHG | DIASTOLIC BLOOD PRESSURE: 54 MMHG

## 2018-10-15 VITALS — SYSTOLIC BLOOD PRESSURE: 116 MMHG | DIASTOLIC BLOOD PRESSURE: 61 MMHG

## 2018-10-15 VITALS — DIASTOLIC BLOOD PRESSURE: 46 MMHG | SYSTOLIC BLOOD PRESSURE: 70 MMHG

## 2018-10-15 VITALS — DIASTOLIC BLOOD PRESSURE: 52 MMHG | SYSTOLIC BLOOD PRESSURE: 106 MMHG

## 2018-10-15 VITALS — SYSTOLIC BLOOD PRESSURE: 80 MMHG | DIASTOLIC BLOOD PRESSURE: 45 MMHG

## 2018-10-15 VITALS — DIASTOLIC BLOOD PRESSURE: 47 MMHG | SYSTOLIC BLOOD PRESSURE: 105 MMHG

## 2018-10-15 VITALS — DIASTOLIC BLOOD PRESSURE: 54 MMHG | SYSTOLIC BLOOD PRESSURE: 104 MMHG

## 2018-10-15 VITALS — SYSTOLIC BLOOD PRESSURE: 111 MMHG | DIASTOLIC BLOOD PRESSURE: 62 MMHG

## 2018-10-15 VITALS — DIASTOLIC BLOOD PRESSURE: 60 MMHG | SYSTOLIC BLOOD PRESSURE: 91 MMHG

## 2018-10-15 VITALS — SYSTOLIC BLOOD PRESSURE: 103 MMHG | DIASTOLIC BLOOD PRESSURE: 62 MMHG

## 2018-10-15 VITALS — SYSTOLIC BLOOD PRESSURE: 100 MMHG | DIASTOLIC BLOOD PRESSURE: 54 MMHG

## 2018-10-15 VITALS — SYSTOLIC BLOOD PRESSURE: 105 MMHG | DIASTOLIC BLOOD PRESSURE: 61 MMHG

## 2018-10-15 VITALS — DIASTOLIC BLOOD PRESSURE: 58 MMHG | SYSTOLIC BLOOD PRESSURE: 110 MMHG

## 2018-10-15 VITALS — SYSTOLIC BLOOD PRESSURE: 100 MMHG | DIASTOLIC BLOOD PRESSURE: 60 MMHG

## 2018-10-15 VITALS — SYSTOLIC BLOOD PRESSURE: 101 MMHG | DIASTOLIC BLOOD PRESSURE: 45 MMHG

## 2018-10-15 VITALS — DIASTOLIC BLOOD PRESSURE: 50 MMHG | SYSTOLIC BLOOD PRESSURE: 112 MMHG

## 2018-10-15 VITALS — DIASTOLIC BLOOD PRESSURE: 61 MMHG | SYSTOLIC BLOOD PRESSURE: 96 MMHG

## 2018-10-15 VITALS — SYSTOLIC BLOOD PRESSURE: 110 MMHG | DIASTOLIC BLOOD PRESSURE: 59 MMHG

## 2018-10-15 VITALS — SYSTOLIC BLOOD PRESSURE: 116 MMHG | DIASTOLIC BLOOD PRESSURE: 68 MMHG

## 2018-10-15 VITALS — SYSTOLIC BLOOD PRESSURE: 104 MMHG | DIASTOLIC BLOOD PRESSURE: 54 MMHG

## 2018-10-15 VITALS — SYSTOLIC BLOOD PRESSURE: 95 MMHG | DIASTOLIC BLOOD PRESSURE: 39 MMHG

## 2018-10-15 VITALS — SYSTOLIC BLOOD PRESSURE: 100 MMHG | DIASTOLIC BLOOD PRESSURE: 59 MMHG

## 2018-10-15 VITALS — SYSTOLIC BLOOD PRESSURE: 79 MMHG | DIASTOLIC BLOOD PRESSURE: 54 MMHG

## 2018-10-15 VITALS — SYSTOLIC BLOOD PRESSURE: 102 MMHG | DIASTOLIC BLOOD PRESSURE: 57 MMHG

## 2018-10-15 VITALS — DIASTOLIC BLOOD PRESSURE: 60 MMHG | SYSTOLIC BLOOD PRESSURE: 101 MMHG

## 2018-10-15 VITALS — DIASTOLIC BLOOD PRESSURE: 68 MMHG | SYSTOLIC BLOOD PRESSURE: 109 MMHG

## 2018-10-15 VITALS — SYSTOLIC BLOOD PRESSURE: 81 MMHG | DIASTOLIC BLOOD PRESSURE: 43 MMHG

## 2018-10-15 VITALS — DIASTOLIC BLOOD PRESSURE: 57 MMHG | SYSTOLIC BLOOD PRESSURE: 111 MMHG

## 2018-10-15 VITALS — SYSTOLIC BLOOD PRESSURE: 101 MMHG | DIASTOLIC BLOOD PRESSURE: 56 MMHG

## 2018-10-15 VITALS — SYSTOLIC BLOOD PRESSURE: 94 MMHG | DIASTOLIC BLOOD PRESSURE: 48 MMHG

## 2018-10-15 VITALS — SYSTOLIC BLOOD PRESSURE: 88 MMHG | DIASTOLIC BLOOD PRESSURE: 45 MMHG

## 2018-10-15 VITALS — SYSTOLIC BLOOD PRESSURE: 102 MMHG | DIASTOLIC BLOOD PRESSURE: 56 MMHG

## 2018-10-15 VITALS — DIASTOLIC BLOOD PRESSURE: 64 MMHG | SYSTOLIC BLOOD PRESSURE: 109 MMHG

## 2018-10-15 VITALS — DIASTOLIC BLOOD PRESSURE: 56 MMHG | SYSTOLIC BLOOD PRESSURE: 101 MMHG

## 2018-10-15 VITALS — DIASTOLIC BLOOD PRESSURE: 61 MMHG | SYSTOLIC BLOOD PRESSURE: 119 MMHG

## 2018-10-15 VITALS — DIASTOLIC BLOOD PRESSURE: 62 MMHG | SYSTOLIC BLOOD PRESSURE: 103 MMHG

## 2018-10-15 VITALS — SYSTOLIC BLOOD PRESSURE: 80 MMHG | DIASTOLIC BLOOD PRESSURE: 43 MMHG

## 2018-10-15 VITALS — DIASTOLIC BLOOD PRESSURE: 54 MMHG | SYSTOLIC BLOOD PRESSURE: 107 MMHG

## 2018-10-15 VITALS — SYSTOLIC BLOOD PRESSURE: 105 MMHG | DIASTOLIC BLOOD PRESSURE: 66 MMHG

## 2018-10-15 VITALS — DIASTOLIC BLOOD PRESSURE: 63 MMHG | SYSTOLIC BLOOD PRESSURE: 109 MMHG

## 2018-10-15 VITALS — DIASTOLIC BLOOD PRESSURE: 60 MMHG | SYSTOLIC BLOOD PRESSURE: 93 MMHG

## 2018-10-15 VITALS — SYSTOLIC BLOOD PRESSURE: 118 MMHG | DIASTOLIC BLOOD PRESSURE: 63 MMHG

## 2018-10-15 VITALS — DIASTOLIC BLOOD PRESSURE: 59 MMHG | SYSTOLIC BLOOD PRESSURE: 105 MMHG

## 2018-10-15 VITALS — SYSTOLIC BLOOD PRESSURE: 113 MMHG | DIASTOLIC BLOOD PRESSURE: 58 MMHG

## 2018-10-15 VITALS — SYSTOLIC BLOOD PRESSURE: 104 MMHG | DIASTOLIC BLOOD PRESSURE: 65 MMHG

## 2018-10-15 LAB
APTT PPP: 36 SEC (ref 23–34)
BASOPHILS # BLD AUTO: 0 /CMM (ref 0–0.2)
BASOPHILS NFR BLD AUTO: 0 % (ref 0–2)
BUN SERPL-MCNC: 44 MG/DL (ref 7–18)
CALCIUM SERPL-MCNC: 9.7 MG/DL (ref 8.5–10.1)
CHLORIDE SERPL-SCNC: 97 MMOL/L (ref 98–107)
CO2 SERPL-SCNC: 22 MMOL/L (ref 21–32)
CREAT SERPL-MCNC: 1.9 MG/DL (ref 0.6–1.3)
EOSINOPHIL NFR BLD AUTO: 1.9 % (ref 0–6)
GLUCOSE SERPL-MCNC: 121 MG/DL (ref 74–106)
HCT VFR BLD AUTO: 22 % (ref 39–51)
HGB BLD-MCNC: 7.1 G/DL (ref 13.5–17.5)
INR PPP: 1.36 (ref 0.87–1.13)
LYMPHOCYTES NFR BLD AUTO: 1.1 /CMM (ref 0.8–4.8)
LYMPHOCYTES NFR BLD AUTO: 4.2 % (ref 20–44)
LYMPHOCYTES NFR BLD MANUAL: 5 % (ref 16–48)
MAGNESIUM SERPL-MCNC: 1.6 MG/DL (ref 1.8–2.4)
MCHC RBC AUTO-ENTMCNC: 33 G/DL (ref 31–36)
MCV RBC AUTO: 98 FL (ref 80–96)
MONOCYTES NFR BLD AUTO: 1.2 /CMM (ref 0.1–1.3)
MONOCYTES NFR BLD AUTO: 4.3 % (ref 2–12)
MONOCYTES NFR BLD MANUAL: 6 % (ref 0–11)
NEUTROPHILS # BLD AUTO: 24.7 /CMM (ref 1.8–8.9)
NEUTROPHILS NFR BLD AUTO: 89.6 % (ref 43–81)
NEUTS BAND NFR BLD MANUAL: 15 % (ref 0–5)
NEUTS SEG NFR BLD MANUAL: 74 % (ref 42–76)
PHOSPHATE SERPL-MCNC: 3.1 MG/DL (ref 2.5–4.9)
PLATELET # BLD AUTO: 21 /CMM (ref 150–450)
POTASSIUM SERPL-SCNC: 4 MMOL/L (ref 3.5–5.1)
RBC # BLD AUTO: 2.22 MIL/UL (ref 4.5–6)
RDW COEFFICIENT OF VARIATION: 26.4 (ref 11.5–15)
SODIUM SERPL-SCNC: 129 MMOL/L (ref 136–145)
WBC NRBC COR # BLD AUTO: 27.6 K/UL (ref 4.3–11)

## 2018-10-15 RX ADMIN — MUPIROCIN SCH APPLIC: 20 OINTMENT TOPICAL at 08:31

## 2018-10-15 RX ADMIN — Medication SCH EACH: at 05:12

## 2018-10-15 RX ADMIN — Medication SCH EACH: at 17:15

## 2018-10-15 RX ADMIN — VANCOMYCIN HYDROCHLORIDE SCH MG: 125 CAPSULE ORAL at 05:12

## 2018-10-15 RX ADMIN — AMIODARONE HYDROCHLORIDE SCH MG: 200 TABLET ORAL at 08:30

## 2018-10-15 RX ADMIN — VANCOMYCIN HYDROCHLORIDE SCH MG: 125 CAPSULE ORAL at 11:54

## 2018-10-15 RX ADMIN — VANCOMYCIN HYDROCHLORIDE SCH MG: 125 CAPSULE ORAL at 00:47

## 2018-10-15 RX ADMIN — NYSTATIN SCH APPLIC: 100000 CREAM TOPICAL at 17:14

## 2018-10-15 RX ADMIN — SODIUM CHLORIDE SCH MG: 9 INJECTION, SOLUTION INTRAVENOUS at 08:29

## 2018-10-15 RX ADMIN — DEXTROSE AND SODIUM CHLORIDE PRN MLS/HR: 5; 900 INJECTION, SOLUTION INTRAVENOUS at 10:50

## 2018-10-15 RX ADMIN — Medication SCH EACH: at 20:43

## 2018-10-15 RX ADMIN — TOBRAMYCIN AND DEXAMETHASONE SCH DROP: 3; 1 SUSPENSION/ DROPS OPHTHALMIC at 17:16

## 2018-10-15 RX ADMIN — MUPIROCIN SCH APPLIC: 20 OINTMENT TOPICAL at 20:43

## 2018-10-15 RX ADMIN — DEXTROSE MONOHYDRATE PRN MLS/HR: 50 INJECTION, SOLUTION INTRAVENOUS at 10:51

## 2018-10-15 RX ADMIN — NYSTATIN SCH APPLIC: 100000 CREAM TOPICAL at 02:00

## 2018-10-15 RX ADMIN — LEVOTHYROXINE SODIUM SCH MCG: 75 TABLET ORAL at 08:29

## 2018-10-15 RX ADMIN — LEVETIRACETAM SCH MG: 250 TABLET, FILM COATED ORAL at 17:14

## 2018-10-15 RX ADMIN — Medication SCH EACH: at 08:30

## 2018-10-15 RX ADMIN — AMIODARONE HYDROCHLORIDE SCH MG: 200 TABLET ORAL at 11:59

## 2018-10-15 RX ADMIN — SODIUM CHLORIDE SCH MLS/HR: 9 INJECTION, SOLUTION INTRAVENOUS at 11:53

## 2018-10-15 RX ADMIN — Medication SCH EACH: at 11:59

## 2018-10-15 RX ADMIN — TOBRAMYCIN AND DEXAMETHASONE SCH DROP: 3; 1 SUSPENSION/ DROPS OPHTHALMIC at 08:31

## 2018-10-15 RX ADMIN — LEVETIRACETAM SCH MG: 250 TABLET, FILM COATED ORAL at 08:29

## 2018-10-15 RX ADMIN — Medication SCH MLS/HR: at 09:15

## 2018-10-15 RX ADMIN — Medication SCH MLS/HR: at 00:46

## 2018-10-15 RX ADMIN — AMIODARONE HYDROCHLORIDE SCH MG: 200 TABLET ORAL at 17:15

## 2018-10-15 RX ADMIN — Medication SCH MLS/HR: at 17:14

## 2018-10-15 RX ADMIN — VANCOMYCIN HYDROCHLORIDE SCH MG: 125 CAPSULE ORAL at 17:15

## 2018-10-15 RX ADMIN — Medication SCH EACH: at 00:47

## 2018-10-15 RX ADMIN — Medication SCH EACH: at 08:29

## 2018-10-15 NOTE — NUR
ICU/RN:



Pt noted with mucoid green BM; wound and hygienic care rendered. Pt with labile BP; 
requiring titration of levophed with turning and repositioning.

## 2018-10-15 NOTE — NUR
ICU/RN:



Pt in stable condition, no distress noted. Levophed infusing at 4mcg/kg/min. Breathing even 
and unlabored, airway cleared of secretions. Dressings C/D/I. Care endorsed to PM RN for 
ELÍAS.

## 2018-10-15 NOTE — NUR
ICU RN. INITIAL ASSESSMENT. RECEIVED THE PT REST ON THE BED. TRACH TO VENT CONNECTED,PT IS 
OBTUNDED. TRACH IS PORTEX#9,AC 24,,FIO2  50%.SAT 97%. NO ACUTE DISTRESS NOTED. CARDIAC 
MONITOR SHOWING NSR. IV RT UPPER ARM MID LINE. LT HAND OLD SHUNT. NOT WORKING, ANDREZ HAND 
SWELLING AND REDNESS. LT SUBCLAVIAN HD  CATH, IVF D5NS  75ML/H,LEVOPHED 5MCG/MIN,HOB 
ELEVATED, GT INTACT, PT IS NPO. WILL CONTINUE TO MONITOR VITALS.

## 2018-10-15 NOTE — NUR
RECEIVED PT VENT, TRACHED PTX 9. PT TOLERATING VENT SETTINGS. SX'D FOR SML AMT OF THIN WHITE 
SECRETIONS. VENT ALARMS SET AND AUDIBLE. AMBU BAG AT BEDSIDE. VENT PLUGGED INTO RED OUTLET. 
WILL CONTINUE TO MONITOR.

-------------------------------------------------------------------------------

Addendum: 10/15/18 at 2017 by BENNY CUEVAS RT

-------------------------------------------------------------------------------

Amended: Links added.

## 2018-10-15 NOTE — NUR
WOUND CARE CONSULT: PT SEEN FOR SACRAL DEEP TISSUE INJURY. PT NOTED TO HAVE ALMOST CONSTANT 
STOOLING. PT HAS MULTIPLE CO-MORBIDITIES INCLUDING MULTI-SYSTEM ORGAN FAILURE INCLUDING END 
STAGE RENAL FAILURE, RESPIRATORY FAILURE (ON VENTILATOR), PANCYTOPENIA, ANEMIA, CIRRHOTIC 
LIVER, AND ANOXIC ENCEPHALOPATHY.  FURTHER SKIN BREAKDOWN MAY BE UNAVOIDABLE. 
RECOMMENDATIONS MADE FOR SKIN PROTECTION AND CARE. DISCUSSED WITH NURSING STAFF. DEFER TO 
SURGICAL TEAM FOR WOUND TREATMENT PLAN. WILL SEE PRN. 

-------------------------------------------------------------------------------

Addendum: 10/15/18 at 0803 by DOC MACARIO WNDNU

-------------------------------------------------------------------------------

Amended: Links added.

-------------------------------------------------------------------------------

Addendum: 10/15/18 at 0807 by DOC MACARIO WNJOSEFINAU

-------------------------------------------------------------------------------

ALL PRESSURE ULCER PREVENTION MEASURES CURRENTLY IN PLACE. PT ON [facility]. 

-------------------------------------------------------------------------------

Addendum: 10/15/18 at 0928 by DOC MACARIO WNDNU

-------------------------------------------------------------------------------

WHITE COUNT IS CURRENTLY 27.6 AND PLATELETS 21.

## 2018-10-15 NOTE — NUR
ICU/RN:



Pt noted with soft, green, mucoid stool. Hygienic care and bed bath rendered. Wound care RN 
at bedside with recommendations. Noted.

## 2018-10-15 NOTE — NUR
ICU/RN:



F/U phone call placed to Sugey Altman, ID consult to report blood cx + for yeast. 
Awaiting call back. Charge RN aware.

## 2018-10-15 NOTE — NUR
RT

PATIENT REC'D TRACHED ON [facility] VENT WITH ORDERED SETTINGS. VENT ALARMS CHECKED + AUDIBLE. 
TRACH SECURE + IN PROPER POSITION. B/S DIM COARSE. PATIENT AIRWAY SUCTIONED AND PATENT WITH 
MOD AMT OF PALE PETERSEN SEMITHICK SECRETIONS. AMBU BAG AT HOB

-------------------------------------------------------------------------------

Addendum: 10/15/18 at 1048 by RIKKI HAWTHORNE RT

-------------------------------------------------------------------------------

Amended: Links added.

## 2018-10-15 NOTE — NUR
ICU/RN:



Dr Roach and JASMINE Mayes [ID] rounds; updated on pt status. Cultures ordered; Dr Roach 
made aware of of + cultures and ID rec for line holiday. Pt for HD today.

## 2018-10-16 VITALS — DIASTOLIC BLOOD PRESSURE: 47 MMHG | SYSTOLIC BLOOD PRESSURE: 81 MMHG

## 2018-10-16 VITALS — SYSTOLIC BLOOD PRESSURE: 98 MMHG | DIASTOLIC BLOOD PRESSURE: 58 MMHG

## 2018-10-16 VITALS — DIASTOLIC BLOOD PRESSURE: 45 MMHG | SYSTOLIC BLOOD PRESSURE: 104 MMHG

## 2018-10-16 VITALS — DIASTOLIC BLOOD PRESSURE: 58 MMHG | SYSTOLIC BLOOD PRESSURE: 100 MMHG

## 2018-10-16 VITALS — DIASTOLIC BLOOD PRESSURE: 49 MMHG | SYSTOLIC BLOOD PRESSURE: 97 MMHG

## 2018-10-16 VITALS — SYSTOLIC BLOOD PRESSURE: 111 MMHG | DIASTOLIC BLOOD PRESSURE: 52 MMHG

## 2018-10-16 VITALS — DIASTOLIC BLOOD PRESSURE: 58 MMHG | SYSTOLIC BLOOD PRESSURE: 110 MMHG

## 2018-10-16 VITALS — SYSTOLIC BLOOD PRESSURE: 110 MMHG | DIASTOLIC BLOOD PRESSURE: 51 MMHG

## 2018-10-16 VITALS — SYSTOLIC BLOOD PRESSURE: 102 MMHG | DIASTOLIC BLOOD PRESSURE: 61 MMHG

## 2018-10-16 VITALS — SYSTOLIC BLOOD PRESSURE: 100 MMHG | DIASTOLIC BLOOD PRESSURE: 55 MMHG

## 2018-10-16 VITALS — SYSTOLIC BLOOD PRESSURE: 96 MMHG | DIASTOLIC BLOOD PRESSURE: 57 MMHG

## 2018-10-16 VITALS — DIASTOLIC BLOOD PRESSURE: 59 MMHG | SYSTOLIC BLOOD PRESSURE: 111 MMHG

## 2018-10-16 VITALS — SYSTOLIC BLOOD PRESSURE: 99 MMHG | DIASTOLIC BLOOD PRESSURE: 59 MMHG

## 2018-10-16 VITALS — DIASTOLIC BLOOD PRESSURE: 63 MMHG | SYSTOLIC BLOOD PRESSURE: 95 MMHG

## 2018-10-16 VITALS — DIASTOLIC BLOOD PRESSURE: 60 MMHG | SYSTOLIC BLOOD PRESSURE: 98 MMHG

## 2018-10-16 VITALS — SYSTOLIC BLOOD PRESSURE: 95 MMHG | DIASTOLIC BLOOD PRESSURE: 51 MMHG

## 2018-10-16 VITALS — DIASTOLIC BLOOD PRESSURE: 53 MMHG | SYSTOLIC BLOOD PRESSURE: 102 MMHG

## 2018-10-16 VITALS — SYSTOLIC BLOOD PRESSURE: 107 MMHG | DIASTOLIC BLOOD PRESSURE: 63 MMHG

## 2018-10-16 VITALS — SYSTOLIC BLOOD PRESSURE: 122 MMHG | DIASTOLIC BLOOD PRESSURE: 105 MMHG

## 2018-10-16 VITALS — SYSTOLIC BLOOD PRESSURE: 119 MMHG | DIASTOLIC BLOOD PRESSURE: 41 MMHG

## 2018-10-16 VITALS — DIASTOLIC BLOOD PRESSURE: 42 MMHG | SYSTOLIC BLOOD PRESSURE: 90 MMHG

## 2018-10-16 VITALS — DIASTOLIC BLOOD PRESSURE: 51 MMHG | SYSTOLIC BLOOD PRESSURE: 99 MMHG

## 2018-10-16 VITALS — DIASTOLIC BLOOD PRESSURE: 48 MMHG | SYSTOLIC BLOOD PRESSURE: 95 MMHG

## 2018-10-16 VITALS — DIASTOLIC BLOOD PRESSURE: 50 MMHG | SYSTOLIC BLOOD PRESSURE: 81 MMHG

## 2018-10-16 VITALS — DIASTOLIC BLOOD PRESSURE: 52 MMHG | SYSTOLIC BLOOD PRESSURE: 98 MMHG

## 2018-10-16 VITALS — DIASTOLIC BLOOD PRESSURE: 70 MMHG | SYSTOLIC BLOOD PRESSURE: 101 MMHG

## 2018-10-16 VITALS — SYSTOLIC BLOOD PRESSURE: 111 MMHG | DIASTOLIC BLOOD PRESSURE: 57 MMHG

## 2018-10-16 VITALS — DIASTOLIC BLOOD PRESSURE: 56 MMHG | SYSTOLIC BLOOD PRESSURE: 104 MMHG

## 2018-10-16 VITALS — DIASTOLIC BLOOD PRESSURE: 55 MMHG | SYSTOLIC BLOOD PRESSURE: 95 MMHG

## 2018-10-16 VITALS — DIASTOLIC BLOOD PRESSURE: 60 MMHG | SYSTOLIC BLOOD PRESSURE: 120 MMHG

## 2018-10-16 VITALS — SYSTOLIC BLOOD PRESSURE: 98 MMHG | DIASTOLIC BLOOD PRESSURE: 53 MMHG

## 2018-10-16 VITALS — SYSTOLIC BLOOD PRESSURE: 110 MMHG | DIASTOLIC BLOOD PRESSURE: 59 MMHG

## 2018-10-16 VITALS — SYSTOLIC BLOOD PRESSURE: 95 MMHG | DIASTOLIC BLOOD PRESSURE: 66 MMHG

## 2018-10-16 VITALS — SYSTOLIC BLOOD PRESSURE: 110 MMHG | DIASTOLIC BLOOD PRESSURE: 70 MMHG

## 2018-10-16 VITALS — SYSTOLIC BLOOD PRESSURE: 101 MMHG | DIASTOLIC BLOOD PRESSURE: 57 MMHG

## 2018-10-16 VITALS — SYSTOLIC BLOOD PRESSURE: 85 MMHG | DIASTOLIC BLOOD PRESSURE: 59 MMHG

## 2018-10-16 VITALS — DIASTOLIC BLOOD PRESSURE: 49 MMHG | SYSTOLIC BLOOD PRESSURE: 103 MMHG

## 2018-10-16 VITALS — SYSTOLIC BLOOD PRESSURE: 92 MMHG | DIASTOLIC BLOOD PRESSURE: 54 MMHG

## 2018-10-16 VITALS — SYSTOLIC BLOOD PRESSURE: 101 MMHG | DIASTOLIC BLOOD PRESSURE: 40 MMHG

## 2018-10-16 VITALS — SYSTOLIC BLOOD PRESSURE: 97 MMHG | DIASTOLIC BLOOD PRESSURE: 54 MMHG

## 2018-10-16 VITALS — DIASTOLIC BLOOD PRESSURE: 48 MMHG | SYSTOLIC BLOOD PRESSURE: 85 MMHG

## 2018-10-16 VITALS — DIASTOLIC BLOOD PRESSURE: 61 MMHG | SYSTOLIC BLOOD PRESSURE: 108 MMHG

## 2018-10-16 VITALS — SYSTOLIC BLOOD PRESSURE: 105 MMHG | DIASTOLIC BLOOD PRESSURE: 52 MMHG

## 2018-10-16 VITALS — DIASTOLIC BLOOD PRESSURE: 52 MMHG | SYSTOLIC BLOOD PRESSURE: 109 MMHG

## 2018-10-16 VITALS — DIASTOLIC BLOOD PRESSURE: 59 MMHG | SYSTOLIC BLOOD PRESSURE: 92 MMHG

## 2018-10-16 VITALS — SYSTOLIC BLOOD PRESSURE: 120 MMHG | DIASTOLIC BLOOD PRESSURE: 61 MMHG

## 2018-10-16 VITALS — DIASTOLIC BLOOD PRESSURE: 51 MMHG | SYSTOLIC BLOOD PRESSURE: 110 MMHG

## 2018-10-16 VITALS — DIASTOLIC BLOOD PRESSURE: 60 MMHG | SYSTOLIC BLOOD PRESSURE: 122 MMHG

## 2018-10-16 VITALS — DIASTOLIC BLOOD PRESSURE: 70 MMHG | SYSTOLIC BLOOD PRESSURE: 94 MMHG

## 2018-10-16 VITALS — DIASTOLIC BLOOD PRESSURE: 57 MMHG | SYSTOLIC BLOOD PRESSURE: 112 MMHG

## 2018-10-16 VITALS — DIASTOLIC BLOOD PRESSURE: 45 MMHG | SYSTOLIC BLOOD PRESSURE: 74 MMHG

## 2018-10-16 VITALS — DIASTOLIC BLOOD PRESSURE: 60 MMHG | SYSTOLIC BLOOD PRESSURE: 102 MMHG

## 2018-10-16 VITALS — SYSTOLIC BLOOD PRESSURE: 93 MMHG | DIASTOLIC BLOOD PRESSURE: 49 MMHG

## 2018-10-16 VITALS — DIASTOLIC BLOOD PRESSURE: 56 MMHG | SYSTOLIC BLOOD PRESSURE: 101 MMHG

## 2018-10-16 VITALS — SYSTOLIC BLOOD PRESSURE: 108 MMHG | DIASTOLIC BLOOD PRESSURE: 50 MMHG

## 2018-10-16 LAB
BASOPHILS # BLD AUTO: 0 /CMM (ref 0–0.2)
BASOPHILS NFR BLD AUTO: 0 % (ref 0–2)
BUN SERPL-MCNC: 51 MG/DL (ref 7–18)
CALCIUM SERPL-MCNC: 10.3 MG/DL (ref 8.5–10.1)
CHLORIDE SERPL-SCNC: 99 MMOL/L (ref 98–107)
CO2 SERPL-SCNC: 23 MMOL/L (ref 21–32)
CREAT SERPL-MCNC: 2.2 MG/DL (ref 0.6–1.3)
EOSINOPHIL NFR BLD AUTO: 2.4 % (ref 0–6)
GLUCOSE SERPL-MCNC: 104 MG/DL (ref 74–106)
HCT VFR BLD AUTO: 22 % (ref 39–51)
HGB BLD-MCNC: 7.1 G/DL (ref 13.5–17.5)
LYMPHOCYTES NFR BLD AUTO: 0.9 /CMM (ref 0.8–4.8)
LYMPHOCYTES NFR BLD AUTO: 4.4 % (ref 20–44)
LYMPHOCYTES NFR BLD MANUAL: 7 % (ref 16–48)
MAGNESIUM SERPL-MCNC: 1.8 MG/DL (ref 1.8–2.4)
MCHC RBC AUTO-ENTMCNC: 33 G/DL (ref 31–36)
MCV RBC AUTO: 97 FL (ref 80–96)
MONOCYTES NFR BLD AUTO: 0.7 /CMM (ref 0.1–1.3)
MONOCYTES NFR BLD AUTO: 3.2 % (ref 2–12)
MONOCYTES NFR BLD MANUAL: 4 % (ref 0–11)
NEUTROPHILS # BLD AUTO: 18.9 /CMM (ref 1.8–8.9)
NEUTROPHILS NFR BLD AUTO: 90 % (ref 43–81)
NEUTS BAND NFR BLD MANUAL: 9 % (ref 0–5)
NEUTS SEG NFR BLD MANUAL: 80 % (ref 42–76)
PLATELET # BLD AUTO: 21 /CMM (ref 150–450)
POTASSIUM SERPL-SCNC: 4 MMOL/L (ref 3.5–5.1)
RBC # BLD AUTO: 2.24 MIL/UL (ref 4.5–6)
RDW COEFFICIENT OF VARIATION: 26.4 (ref 11.5–15)
SODIUM SERPL-SCNC: 131 MMOL/L (ref 136–145)
WBC NRBC COR # BLD AUTO: 21 K/UL (ref 4.3–11)

## 2018-10-16 PROCEDURE — 05PY33Z REMOVAL OF INFUSION DEVICE FROM UPPER VEIN, PERCUTANEOUS APPROACH: ICD-10-PCS | Performed by: THORACIC SURGERY (CARDIOTHORACIC VASCULAR SURGERY)

## 2018-10-16 PROCEDURE — 5A1D70Z PERFORMANCE OF URINARY FILTRATION, INTERMITTENT, LESS THAN 6 HOURS PER DAY: ICD-10-PCS | Performed by: INTERNAL MEDICINE

## 2018-10-16 RX ADMIN — Medication SCH MLS/HR: at 00:20

## 2018-10-16 RX ADMIN — Medication SCH MLS/HR: at 17:59

## 2018-10-16 RX ADMIN — Medication SCH OZ: at 08:52

## 2018-10-16 RX ADMIN — VANCOMYCIN HYDROCHLORIDE SCH MG: 125 CAPSULE ORAL at 18:06

## 2018-10-16 RX ADMIN — TOBRAMYCIN AND DEXAMETHASONE SCH DROP: 3; 1 SUSPENSION/ DROPS OPHTHALMIC at 18:07

## 2018-10-16 RX ADMIN — Medication SCH MLS/HR: at 08:47

## 2018-10-16 RX ADMIN — LEVOTHYROXINE SODIUM SCH MCG: 75 TABLET ORAL at 08:31

## 2018-10-16 RX ADMIN — Medication SCH EACH: at 18:00

## 2018-10-16 RX ADMIN — Medication SCH EACH: at 00:25

## 2018-10-16 RX ADMIN — AMIODARONE HYDROCHLORIDE SCH MG: 200 TABLET ORAL at 08:48

## 2018-10-16 RX ADMIN — VANCOMYCIN HYDROCHLORIDE SCH MG: 125 CAPSULE ORAL at 05:17

## 2018-10-16 RX ADMIN — LEVETIRACETAM SCH MG: 250 TABLET, FILM COATED ORAL at 18:00

## 2018-10-16 RX ADMIN — SODIUM CHLORIDE SCH MLS/HR: 9 INJECTION, SOLUTION INTRAVENOUS at 11:31

## 2018-10-16 RX ADMIN — AMIODARONE HYDROCHLORIDE SCH MG: 200 TABLET ORAL at 17:00

## 2018-10-16 RX ADMIN — Medication SCH EACH: at 18:20

## 2018-10-16 RX ADMIN — SODIUM CHLORIDE SCH MG: 9 INJECTION, SOLUTION INTRAVENOUS at 08:47

## 2018-10-16 RX ADMIN — MUPIROCIN SCH APPLIC: 20 OINTMENT TOPICAL at 08:52

## 2018-10-16 RX ADMIN — DEXTROSE AND SODIUM CHLORIDE PRN MLS/HR: 5; 900 INJECTION, SOLUTION INTRAVENOUS at 00:20

## 2018-10-16 RX ADMIN — DEXTROSE AND SODIUM CHLORIDE PRN MLS/HR: 5; 900 INJECTION, SOLUTION INTRAVENOUS at 16:33

## 2018-10-16 RX ADMIN — NYSTATIN SCH APPLIC: 100000 CREAM TOPICAL at 02:00

## 2018-10-16 RX ADMIN — VANCOMYCIN HYDROCHLORIDE SCH MG: 125 CAPSULE ORAL at 00:19

## 2018-10-16 RX ADMIN — Medication SCH EACH: at 20:54

## 2018-10-16 RX ADMIN — Medication SCH EACH: at 13:24

## 2018-10-16 RX ADMIN — AMIODARONE HYDROCHLORIDE SCH MG: 200 TABLET ORAL at 13:45

## 2018-10-16 RX ADMIN — MUPIROCIN SCH APPLIC: 20 OINTMENT TOPICAL at 20:55

## 2018-10-16 RX ADMIN — Medication SCH EACH: at 08:47

## 2018-10-16 RX ADMIN — VANCOMYCIN HYDROCHLORIDE SCH MG: 125 CAPSULE ORAL at 23:45

## 2018-10-16 RX ADMIN — NYSTATIN SCH APPLIC: 100000 CREAM TOPICAL at 14:38

## 2018-10-16 RX ADMIN — Medication SCH EACH: at 05:17

## 2018-10-16 RX ADMIN — Medication SCH EACH: at 09:11

## 2018-10-16 RX ADMIN — TOBRAMYCIN AND DEXAMETHASONE SCH DROP: 3; 1 SUSPENSION/ DROPS OPHTHALMIC at 08:53

## 2018-10-16 RX ADMIN — LEVETIRACETAM SCH MG: 250 TABLET, FILM COATED ORAL at 08:47

## 2018-10-16 RX ADMIN — VANCOMYCIN HYDROCHLORIDE SCH MG: 125 CAPSULE ORAL at 11:30

## 2018-10-16 NOTE — NUR
RN NOTES 



RECEIVED PATIENT ON [facility] VENT WITH BREATHING NORMAL, EVEN AND UNLABORED. NO SOB NOTED.  NO 
ACUTE DISTRESS NOTED. VENT SETTING REVIEWED AND VERIFIED. TOLERATED WELL. AFEBRILE. TELE 
MONITOR REVEALS SR, HR=62. LEILANI MIDLINE IS PATENT AND INTACT, RUNNING IVF AS PER ORDER.  ON 
LEVOPHED @ 4MCG/MIN. GT ON CLAMP. L SUBCLAVIAN HD CATH IS INTACT. KEPT CLEAN, DRY AND 
COMFORTABLE. ALL NEEDS ATTENDED. SAFETY MEASURE OBSERVED. CALL LIGHT WITH IN REACH. WILL 
CONT TO MONITOR.

## 2018-10-16 NOTE — NUR
ICU RN. AM CARE, ORAL CARE, BED BATH GIVEN. LINEN CHANGED, REMAINING SAME  VENT SETTING 
TOLERATED WELL. SAT 98%. NO ACUTE DISTRESS NOTED, CARDIAC MONITOR SHOWING NSR. IV RT UPPER 
ARM   MID LINE IVF D5NS 75ML/H,LEVOPHED 3MCG/MIN. HOB ELEVATED, WOUND DRESSING DONE. TURN 
AND REPOSITION Q2H. WILL CONTINUE TO MONITOR VITALS.

## 2018-10-16 NOTE — NUR
ICU RN. PT HAS NO NECK BLEEDING AT THIS TIME, DRESSING DRY AND CLEAN. ORDER IS IF PT HAS 
NECK BLEEDING TRANSFUSE PLATELETS.

## 2018-10-16 NOTE — NUR
Received report from Myra RODRIGUEZ,RN for continuity of care.Patient resting in no acute 
distress.

VDRF continued on same vent settings as prescribed.SB 55 per monitor.Levophed gtt infusing 

for BP support and will titrate accordingly.IVF infusing.NPO status.Will monitor BS Q 4 hrs 
with 

SS.Patient anuric.Continue monitoring.

## 2018-10-16 NOTE — NUR
ICU RN. INITIAL ASSESSMENT. RECEIVED THE PT REST ON THE BED. TRACH TO VENT CONNECTED. PT IS 
NONVERBAL. PORTEX#9,AC 24,,FIO2 50%. SAT 98%. CARDIAC MONITOR SHOWING S DAMIEN. RATE IS 
44. IV RT UPPER ARM MID LINE IVF D5NS 75ML/H,LEVOPHED 4MCG/MIN, GT CLAMPED. PT IS NPO. HOB 
ELEVATED. PT IS UNSTABLE. WILL CONTINUE TO MONITOR VITALS.

## 2018-10-17 VITALS — DIASTOLIC BLOOD PRESSURE: 43 MMHG | SYSTOLIC BLOOD PRESSURE: 106 MMHG

## 2018-10-17 VITALS — DIASTOLIC BLOOD PRESSURE: 59 MMHG | SYSTOLIC BLOOD PRESSURE: 126 MMHG

## 2018-10-17 VITALS — DIASTOLIC BLOOD PRESSURE: 55 MMHG | SYSTOLIC BLOOD PRESSURE: 115 MMHG

## 2018-10-17 VITALS — SYSTOLIC BLOOD PRESSURE: 130 MMHG | DIASTOLIC BLOOD PRESSURE: 90 MMHG

## 2018-10-17 VITALS — DIASTOLIC BLOOD PRESSURE: 73 MMHG | SYSTOLIC BLOOD PRESSURE: 130 MMHG

## 2018-10-17 VITALS — DIASTOLIC BLOOD PRESSURE: 56 MMHG | SYSTOLIC BLOOD PRESSURE: 103 MMHG

## 2018-10-17 VITALS — SYSTOLIC BLOOD PRESSURE: 96 MMHG | DIASTOLIC BLOOD PRESSURE: 75 MMHG

## 2018-10-17 VITALS — SYSTOLIC BLOOD PRESSURE: 115 MMHG | DIASTOLIC BLOOD PRESSURE: 67 MMHG

## 2018-10-17 VITALS — SYSTOLIC BLOOD PRESSURE: 101 MMHG | DIASTOLIC BLOOD PRESSURE: 53 MMHG

## 2018-10-17 VITALS — SYSTOLIC BLOOD PRESSURE: 113 MMHG | DIASTOLIC BLOOD PRESSURE: 61 MMHG

## 2018-10-17 VITALS — SYSTOLIC BLOOD PRESSURE: 113 MMHG | DIASTOLIC BLOOD PRESSURE: 59 MMHG

## 2018-10-17 VITALS — DIASTOLIC BLOOD PRESSURE: 66 MMHG | SYSTOLIC BLOOD PRESSURE: 108 MMHG

## 2018-10-17 VITALS — DIASTOLIC BLOOD PRESSURE: 48 MMHG | SYSTOLIC BLOOD PRESSURE: 105 MMHG

## 2018-10-17 VITALS — SYSTOLIC BLOOD PRESSURE: 105 MMHG | DIASTOLIC BLOOD PRESSURE: 55 MMHG

## 2018-10-17 VITALS — DIASTOLIC BLOOD PRESSURE: 62 MMHG | SYSTOLIC BLOOD PRESSURE: 110 MMHG

## 2018-10-17 VITALS — DIASTOLIC BLOOD PRESSURE: 56 MMHG | SYSTOLIC BLOOD PRESSURE: 98 MMHG

## 2018-10-17 VITALS — DIASTOLIC BLOOD PRESSURE: 50 MMHG | SYSTOLIC BLOOD PRESSURE: 126 MMHG

## 2018-10-17 VITALS — DIASTOLIC BLOOD PRESSURE: 53 MMHG | SYSTOLIC BLOOD PRESSURE: 100 MMHG

## 2018-10-17 VITALS — DIASTOLIC BLOOD PRESSURE: 51 MMHG | SYSTOLIC BLOOD PRESSURE: 109 MMHG

## 2018-10-17 VITALS — SYSTOLIC BLOOD PRESSURE: 93 MMHG | DIASTOLIC BLOOD PRESSURE: 53 MMHG

## 2018-10-17 VITALS — SYSTOLIC BLOOD PRESSURE: 88 MMHG | DIASTOLIC BLOOD PRESSURE: 53 MMHG

## 2018-10-17 VITALS — DIASTOLIC BLOOD PRESSURE: 57 MMHG | SYSTOLIC BLOOD PRESSURE: 105 MMHG

## 2018-10-17 VITALS — SYSTOLIC BLOOD PRESSURE: 105 MMHG | DIASTOLIC BLOOD PRESSURE: 54 MMHG

## 2018-10-17 VITALS — DIASTOLIC BLOOD PRESSURE: 49 MMHG | SYSTOLIC BLOOD PRESSURE: 91 MMHG

## 2018-10-17 VITALS — SYSTOLIC BLOOD PRESSURE: 108 MMHG | DIASTOLIC BLOOD PRESSURE: 51 MMHG

## 2018-10-17 VITALS — DIASTOLIC BLOOD PRESSURE: 64 MMHG | SYSTOLIC BLOOD PRESSURE: 114 MMHG

## 2018-10-17 VITALS — SYSTOLIC BLOOD PRESSURE: 84 MMHG | DIASTOLIC BLOOD PRESSURE: 50 MMHG

## 2018-10-17 VITALS — DIASTOLIC BLOOD PRESSURE: 54 MMHG | SYSTOLIC BLOOD PRESSURE: 105 MMHG

## 2018-10-17 VITALS — DIASTOLIC BLOOD PRESSURE: 54 MMHG | SYSTOLIC BLOOD PRESSURE: 107 MMHG

## 2018-10-17 VITALS — DIASTOLIC BLOOD PRESSURE: 59 MMHG | SYSTOLIC BLOOD PRESSURE: 114 MMHG

## 2018-10-17 VITALS — SYSTOLIC BLOOD PRESSURE: 106 MMHG | DIASTOLIC BLOOD PRESSURE: 43 MMHG

## 2018-10-17 VITALS — DIASTOLIC BLOOD PRESSURE: 55 MMHG | SYSTOLIC BLOOD PRESSURE: 104 MMHG

## 2018-10-17 VITALS — SYSTOLIC BLOOD PRESSURE: 110 MMHG | DIASTOLIC BLOOD PRESSURE: 50 MMHG

## 2018-10-17 VITALS — DIASTOLIC BLOOD PRESSURE: 50 MMHG | SYSTOLIC BLOOD PRESSURE: 111 MMHG

## 2018-10-17 VITALS — DIASTOLIC BLOOD PRESSURE: 77 MMHG | SYSTOLIC BLOOD PRESSURE: 121 MMHG

## 2018-10-17 VITALS — SYSTOLIC BLOOD PRESSURE: 123 MMHG | DIASTOLIC BLOOD PRESSURE: 58 MMHG

## 2018-10-17 VITALS — DIASTOLIC BLOOD PRESSURE: 41 MMHG | SYSTOLIC BLOOD PRESSURE: 63 MMHG

## 2018-10-17 VITALS — DIASTOLIC BLOOD PRESSURE: 69 MMHG | SYSTOLIC BLOOD PRESSURE: 102 MMHG

## 2018-10-17 VITALS — SYSTOLIC BLOOD PRESSURE: 110 MMHG | DIASTOLIC BLOOD PRESSURE: 51 MMHG

## 2018-10-17 VITALS — SYSTOLIC BLOOD PRESSURE: 131 MMHG | DIASTOLIC BLOOD PRESSURE: 70 MMHG

## 2018-10-17 VITALS — DIASTOLIC BLOOD PRESSURE: 50 MMHG | SYSTOLIC BLOOD PRESSURE: 89 MMHG

## 2018-10-17 VITALS — SYSTOLIC BLOOD PRESSURE: 100 MMHG | DIASTOLIC BLOOD PRESSURE: 48 MMHG

## 2018-10-17 VITALS — DIASTOLIC BLOOD PRESSURE: 52 MMHG | SYSTOLIC BLOOD PRESSURE: 114 MMHG

## 2018-10-17 VITALS — DIASTOLIC BLOOD PRESSURE: 66 MMHG | SYSTOLIC BLOOD PRESSURE: 105 MMHG

## 2018-10-17 VITALS — DIASTOLIC BLOOD PRESSURE: 55 MMHG | SYSTOLIC BLOOD PRESSURE: 111 MMHG

## 2018-10-17 VITALS — SYSTOLIC BLOOD PRESSURE: 97 MMHG | DIASTOLIC BLOOD PRESSURE: 65 MMHG

## 2018-10-17 VITALS — SYSTOLIC BLOOD PRESSURE: 100 MMHG | DIASTOLIC BLOOD PRESSURE: 53 MMHG

## 2018-10-17 VITALS — DIASTOLIC BLOOD PRESSURE: 68 MMHG | SYSTOLIC BLOOD PRESSURE: 125 MMHG

## 2018-10-17 VITALS — DIASTOLIC BLOOD PRESSURE: 53 MMHG | SYSTOLIC BLOOD PRESSURE: 93 MMHG

## 2018-10-17 VITALS — DIASTOLIC BLOOD PRESSURE: 50 MMHG | SYSTOLIC BLOOD PRESSURE: 105 MMHG

## 2018-10-17 VITALS — SYSTOLIC BLOOD PRESSURE: 115 MMHG | DIASTOLIC BLOOD PRESSURE: 57 MMHG

## 2018-10-17 VITALS — SYSTOLIC BLOOD PRESSURE: 109 MMHG | DIASTOLIC BLOOD PRESSURE: 51 MMHG

## 2018-10-17 VITALS — SYSTOLIC BLOOD PRESSURE: 109 MMHG | DIASTOLIC BLOOD PRESSURE: 53 MMHG

## 2018-10-17 VITALS — SYSTOLIC BLOOD PRESSURE: 62 MMHG | DIASTOLIC BLOOD PRESSURE: 37 MMHG

## 2018-10-17 VITALS — SYSTOLIC BLOOD PRESSURE: 111 MMHG | DIASTOLIC BLOOD PRESSURE: 59 MMHG

## 2018-10-17 VITALS — SYSTOLIC BLOOD PRESSURE: 106 MMHG | DIASTOLIC BLOOD PRESSURE: 52 MMHG

## 2018-10-17 VITALS — DIASTOLIC BLOOD PRESSURE: 50 MMHG | SYSTOLIC BLOOD PRESSURE: 110 MMHG

## 2018-10-17 VITALS — SYSTOLIC BLOOD PRESSURE: 97 MMHG | DIASTOLIC BLOOD PRESSURE: 45 MMHG

## 2018-10-17 VITALS — SYSTOLIC BLOOD PRESSURE: 79 MMHG | DIASTOLIC BLOOD PRESSURE: 33 MMHG

## 2018-10-17 VITALS — DIASTOLIC BLOOD PRESSURE: 48 MMHG | SYSTOLIC BLOOD PRESSURE: 97 MMHG

## 2018-10-17 VITALS — SYSTOLIC BLOOD PRESSURE: 104 MMHG | DIASTOLIC BLOOD PRESSURE: 47 MMHG

## 2018-10-17 VITALS — DIASTOLIC BLOOD PRESSURE: 61 MMHG | SYSTOLIC BLOOD PRESSURE: 95 MMHG

## 2018-10-17 VITALS — DIASTOLIC BLOOD PRESSURE: 50 MMHG | SYSTOLIC BLOOD PRESSURE: 84 MMHG

## 2018-10-17 VITALS — SYSTOLIC BLOOD PRESSURE: 114 MMHG | DIASTOLIC BLOOD PRESSURE: 64 MMHG

## 2018-10-17 VITALS — SYSTOLIC BLOOD PRESSURE: 105 MMHG | DIASTOLIC BLOOD PRESSURE: 50 MMHG

## 2018-10-17 VITALS — DIASTOLIC BLOOD PRESSURE: 40 MMHG | SYSTOLIC BLOOD PRESSURE: 98 MMHG

## 2018-10-17 VITALS — SYSTOLIC BLOOD PRESSURE: 100 MMHG | DIASTOLIC BLOOD PRESSURE: 55 MMHG

## 2018-10-17 VITALS — SYSTOLIC BLOOD PRESSURE: 91 MMHG | DIASTOLIC BLOOD PRESSURE: 65 MMHG

## 2018-10-17 VITALS — SYSTOLIC BLOOD PRESSURE: 107 MMHG | DIASTOLIC BLOOD PRESSURE: 56 MMHG

## 2018-10-17 VITALS — DIASTOLIC BLOOD PRESSURE: 83 MMHG | SYSTOLIC BLOOD PRESSURE: 129 MMHG

## 2018-10-17 VITALS — SYSTOLIC BLOOD PRESSURE: 100 MMHG | DIASTOLIC BLOOD PRESSURE: 66 MMHG

## 2018-10-17 VITALS — SYSTOLIC BLOOD PRESSURE: 88 MMHG | DIASTOLIC BLOOD PRESSURE: 49 MMHG

## 2018-10-17 VITALS — SYSTOLIC BLOOD PRESSURE: 99 MMHG | DIASTOLIC BLOOD PRESSURE: 58 MMHG

## 2018-10-17 VITALS — DIASTOLIC BLOOD PRESSURE: 61 MMHG | SYSTOLIC BLOOD PRESSURE: 105 MMHG

## 2018-10-17 VITALS — DIASTOLIC BLOOD PRESSURE: 65 MMHG | SYSTOLIC BLOOD PRESSURE: 119 MMHG

## 2018-10-17 VITALS — DIASTOLIC BLOOD PRESSURE: 50 MMHG | SYSTOLIC BLOOD PRESSURE: 106 MMHG

## 2018-10-17 VITALS — SYSTOLIC BLOOD PRESSURE: 92 MMHG | DIASTOLIC BLOOD PRESSURE: 50 MMHG

## 2018-10-17 VITALS — DIASTOLIC BLOOD PRESSURE: 45 MMHG | SYSTOLIC BLOOD PRESSURE: 97 MMHG

## 2018-10-17 VITALS — DIASTOLIC BLOOD PRESSURE: 65 MMHG | SYSTOLIC BLOOD PRESSURE: 130 MMHG

## 2018-10-17 VITALS — DIASTOLIC BLOOD PRESSURE: 58 MMHG | SYSTOLIC BLOOD PRESSURE: 98 MMHG

## 2018-10-17 VITALS — DIASTOLIC BLOOD PRESSURE: 51 MMHG | SYSTOLIC BLOOD PRESSURE: 110 MMHG

## 2018-10-17 VITALS — DIASTOLIC BLOOD PRESSURE: 49 MMHG | SYSTOLIC BLOOD PRESSURE: 107 MMHG

## 2018-10-17 VITALS — SYSTOLIC BLOOD PRESSURE: 109 MMHG | DIASTOLIC BLOOD PRESSURE: 66 MMHG

## 2018-10-17 VITALS — SYSTOLIC BLOOD PRESSURE: 111 MMHG | DIASTOLIC BLOOD PRESSURE: 65 MMHG

## 2018-10-17 VITALS — DIASTOLIC BLOOD PRESSURE: 62 MMHG | SYSTOLIC BLOOD PRESSURE: 102 MMHG

## 2018-10-17 VITALS — DIASTOLIC BLOOD PRESSURE: 53 MMHG | SYSTOLIC BLOOD PRESSURE: 91 MMHG

## 2018-10-17 VITALS — DIASTOLIC BLOOD PRESSURE: 51 MMHG | SYSTOLIC BLOOD PRESSURE: 105 MMHG

## 2018-10-17 VITALS — SYSTOLIC BLOOD PRESSURE: 96 MMHG | DIASTOLIC BLOOD PRESSURE: 49 MMHG

## 2018-10-17 VITALS — SYSTOLIC BLOOD PRESSURE: 125 MMHG | DIASTOLIC BLOOD PRESSURE: 66 MMHG

## 2018-10-17 VITALS — DIASTOLIC BLOOD PRESSURE: 55 MMHG | SYSTOLIC BLOOD PRESSURE: 100 MMHG

## 2018-10-17 VITALS — SYSTOLIC BLOOD PRESSURE: 93 MMHG | DIASTOLIC BLOOD PRESSURE: 44 MMHG

## 2018-10-17 VITALS — DIASTOLIC BLOOD PRESSURE: 49 MMHG | SYSTOLIC BLOOD PRESSURE: 97 MMHG

## 2018-10-17 VITALS — SYSTOLIC BLOOD PRESSURE: 102 MMHG | DIASTOLIC BLOOD PRESSURE: 54 MMHG

## 2018-10-17 VITALS — SYSTOLIC BLOOD PRESSURE: 72 MMHG | DIASTOLIC BLOOD PRESSURE: 36 MMHG

## 2018-10-17 VITALS — DIASTOLIC BLOOD PRESSURE: 80 MMHG | SYSTOLIC BLOOD PRESSURE: 119 MMHG

## 2018-10-17 VITALS — DIASTOLIC BLOOD PRESSURE: 51 MMHG | SYSTOLIC BLOOD PRESSURE: 106 MMHG

## 2018-10-17 VITALS — SYSTOLIC BLOOD PRESSURE: 117 MMHG | DIASTOLIC BLOOD PRESSURE: 69 MMHG

## 2018-10-17 VITALS — DIASTOLIC BLOOD PRESSURE: 67 MMHG | SYSTOLIC BLOOD PRESSURE: 117 MMHG

## 2018-10-17 VITALS — SYSTOLIC BLOOD PRESSURE: 119 MMHG | DIASTOLIC BLOOD PRESSURE: 54 MMHG

## 2018-10-17 VITALS — SYSTOLIC BLOOD PRESSURE: 95 MMHG | DIASTOLIC BLOOD PRESSURE: 67 MMHG

## 2018-10-17 VITALS — SYSTOLIC BLOOD PRESSURE: 126 MMHG | DIASTOLIC BLOOD PRESSURE: 59 MMHG

## 2018-10-17 VITALS — DIASTOLIC BLOOD PRESSURE: 59 MMHG | SYSTOLIC BLOOD PRESSURE: 99 MMHG

## 2018-10-17 VITALS — DIASTOLIC BLOOD PRESSURE: 71 MMHG | SYSTOLIC BLOOD PRESSURE: 94 MMHG

## 2018-10-17 VITALS — SYSTOLIC BLOOD PRESSURE: 61 MMHG | DIASTOLIC BLOOD PRESSURE: 36 MMHG

## 2018-10-17 LAB
BASOPHILS # BLD AUTO: 0 /CMM (ref 0–0.2)
BASOPHILS NFR BLD AUTO: 0.1 % (ref 0–2)
BUN SERPL-MCNC: 54 MG/DL (ref 7–18)
CALCIUM SERPL-MCNC: 10.2 MG/DL (ref 8.5–10.1)
CHLORIDE SERPL-SCNC: 100 MMOL/L (ref 98–107)
CO2 SERPL-SCNC: 21 MMOL/L (ref 21–32)
CREAT SERPL-MCNC: 2.3 MG/DL (ref 0.6–1.3)
EOSINOPHIL NFR BLD AUTO: 4.1 % (ref 0–6)
EOSINOPHIL NFR BLD MANUAL: 3 % (ref 0–4)
FERRITIN SERPL-MCNC: 2208 NG/ML (ref 8–388)
GLUCOSE SERPL-MCNC: 115 MG/DL (ref 74–106)
HCT VFR BLD AUTO: 22 % (ref 39–51)
HGB BLD-MCNC: 6.9 G/DL (ref 13.5–17.5)
IRON SERPL-MCNC: 32 UG/DL (ref 50–175)
LYMPHOCYTES NFR BLD AUTO: 1.1 /CMM (ref 0.8–4.8)
LYMPHOCYTES NFR BLD AUTO: 6.1 % (ref 20–44)
LYMPHOCYTES NFR BLD MANUAL: 5 % (ref 16–48)
MAGNESIUM SERPL-MCNC: 1.8 MG/DL (ref 1.8–2.4)
MCHC RBC AUTO-ENTMCNC: 32 G/DL (ref 31–36)
MCV RBC AUTO: 99 FL (ref 80–96)
MONOCYTES NFR BLD AUTO: 0.5 /CMM (ref 0.1–1.3)
MONOCYTES NFR BLD AUTO: 2.5 % (ref 2–12)
MONOCYTES NFR BLD MANUAL: 3 % (ref 0–11)
NEUTROPHILS # BLD AUTO: 16.1 /CMM (ref 1.8–8.9)
NEUTROPHILS NFR BLD AUTO: 87.2 % (ref 43–81)
NEUTS SEG NFR BLD MANUAL: 89 % (ref 42–76)
PHOSPHATE SERPL-MCNC: 4.5 MG/DL (ref 2.5–4.9)
PLATELET # BLD AUTO: 22 /CMM (ref 150–450)
POTASSIUM SERPL-SCNC: 3.7 MMOL/L (ref 3.5–5.1)
RBC # BLD AUTO: 2.22 MIL/UL (ref 4.5–6)
RDW COEFFICIENT OF VARIATION: 26 (ref 11.5–15)
SODIUM SERPL-SCNC: 131 MMOL/L (ref 136–145)
TIBC SERPL-MCNC: 75 UG/DL (ref 250–450)
WBC NRBC COR # BLD AUTO: 18.4 K/UL (ref 4.3–11)

## 2018-10-17 RX ADMIN — DEXTROSE MONOHYDRATE PRN MLS/HR: 50 INJECTION, SOLUTION INTRAVENOUS at 08:00

## 2018-10-17 RX ADMIN — MUPIROCIN SCH APPLIC: 20 OINTMENT TOPICAL at 21:56

## 2018-10-17 RX ADMIN — AMIODARONE HYDROCHLORIDE SCH MG: 200 TABLET ORAL at 16:45

## 2018-10-17 RX ADMIN — DEXTROSE AND SODIUM CHLORIDE PRN MLS/HR: 5; 900 INJECTION, SOLUTION INTRAVENOUS at 18:11

## 2018-10-17 RX ADMIN — Medication SCH EACH: at 13:31

## 2018-10-17 RX ADMIN — NYSTATIN SCH APPLIC: 100000 CREAM TOPICAL at 13:45

## 2018-10-17 RX ADMIN — Medication SCH EACH: at 21:56

## 2018-10-17 RX ADMIN — TOBRAMYCIN AND DEXAMETHASONE SCH DROP: 3; 1 SUSPENSION/ DROPS OPHTHALMIC at 08:52

## 2018-10-17 RX ADMIN — Medication SCH MLS/HR: at 08:32

## 2018-10-17 RX ADMIN — NYSTATIN SCH APPLIC: 100000 CREAM TOPICAL at 02:00

## 2018-10-17 RX ADMIN — TOBRAMYCIN AND DEXAMETHASONE SCH DROP: 3; 1 SUSPENSION/ DROPS OPHTHALMIC at 16:59

## 2018-10-17 RX ADMIN — INSULIN HUMAN PRN UNIT: 100 INJECTION, SOLUTION PARENTERAL at 16:58

## 2018-10-17 RX ADMIN — DEXTROSE AND SODIUM CHLORIDE PRN MLS/HR: 5; 900 INJECTION, SOLUTION INTRAVENOUS at 04:30

## 2018-10-17 RX ADMIN — LEVETIRACETAM SCH MG: 250 TABLET, FILM COATED ORAL at 16:45

## 2018-10-17 RX ADMIN — VANCOMYCIN HYDROCHLORIDE SCH MG: 125 CAPSULE ORAL at 05:31

## 2018-10-17 RX ADMIN — MUPIROCIN SCH APPLIC: 20 OINTMENT TOPICAL at 08:51

## 2018-10-17 RX ADMIN — INSULIN HUMAN PRN UNIT: 100 INJECTION, SOLUTION PARENTERAL at 13:38

## 2018-10-17 RX ADMIN — Medication SCH OZ: at 08:52

## 2018-10-17 RX ADMIN — Medication SCH EACH: at 16:58

## 2018-10-17 RX ADMIN — Medication SCH MLS/HR: at 00:47

## 2018-10-17 RX ADMIN — Medication SCH MLS/HR: at 16:48

## 2018-10-17 RX ADMIN — Medication SCH EACH: at 16:45

## 2018-10-17 RX ADMIN — Medication PRN ML: at 18:23

## 2018-10-17 RX ADMIN — LEVETIRACETAM SCH MG: 250 TABLET, FILM COATED ORAL at 08:32

## 2018-10-17 RX ADMIN — AMIODARONE HYDROCHLORIDE SCH MG: 200 TABLET ORAL at 13:31

## 2018-10-17 RX ADMIN — Medication SCH EACH: at 05:07

## 2018-10-17 RX ADMIN — Medication SCH EACH: at 00:46

## 2018-10-17 RX ADMIN — Medication SCH EACH: at 08:55

## 2018-10-17 RX ADMIN — SODIUM CHLORIDE SCH MLS/HR: 9 INJECTION, SOLUTION INTRAVENOUS at 10:48

## 2018-10-17 RX ADMIN — VANCOMYCIN HYDROCHLORIDE SCH MG: 125 CAPSULE ORAL at 12:57

## 2018-10-17 RX ADMIN — Medication SCH EACH: at 08:32

## 2018-10-17 RX ADMIN — LEVOTHYROXINE SODIUM SCH MCG: 75 TABLET ORAL at 08:32

## 2018-10-17 RX ADMIN — SODIUM CHLORIDE SCH MG: 9 INJECTION, SOLUTION INTRAVENOUS at 08:32

## 2018-10-17 RX ADMIN — VANCOMYCIN HYDROCHLORIDE SCH MG: 125 CAPSULE ORAL at 16:59

## 2018-10-17 RX ADMIN — INSULIN HUMAN PRN UNIT: 100 INJECTION, SOLUTION PARENTERAL at 08:57

## 2018-10-17 RX ADMIN — DEXTROSE MONOHYDRATE PRN MLS/HR: 50 INJECTION, SOLUTION INTRAVENOUS at 19:19

## 2018-10-17 RX ADMIN — AMIODARONE HYDROCHLORIDE SCH MG: 200 TABLET ORAL at 08:40

## 2018-10-17 NOTE — NUR
ICU RN: DR. RAMIREZ AT BEDSIDE TO EXAMINE PT. SAID SHE WANTS ANOTHER PLATELET TRANSFUSION. MD 
WAS NOTIFIED THAT PER DAY SHIFT RN REPORT, DR. BAUMAN SAID NOT TO GIVE PLATELET IF NECK IS 
NOT BLEEDING. PT IS S/P 1 UNIT PRBC TRANSFUSION AND ON PENDING HD CATHETER PLACEMENT. NO 
OVERT BLEEDING NOTED AT THIS TIME. MD SAID OK TO HOLD PLT TRANSFUSION UNLESS BLEEDING 
OCCURS.

## 2018-10-17 NOTE — NUR
ICU RN: GT RESIDUAL NOTED AT 250CC. WILL HOLD GTF AT THIS TIME AND WILL CONTINUE TO MONITOR. 
STILL ON LEVOPHED AT 3MCG/MIN FOR BP SUPPORT AND WILL TITRATE AS NEEDED. HOB AT 35 DEGREES.

## 2018-10-17 NOTE — NUR
Dr. Chiu made aware that pt noted to have no gag, cough & corneal reflex. Awaiting 
response.



Tried calling pt's wife contact # in face sheet but it's not ringing.

-------------------------------------------------------------------------------

Addendum: 10/17/18 at 1408 by SEJAL ROA RN

-------------------------------------------------------------------------------

Addendum: Per Dr. Chiu's order to get CT of Head w/o contrast.

## 2018-10-17 NOTE — NUR
RECEIVED PT VENT, TRACHED PTX 9. PT TOLERATING VENT SETTINGS. SX'D FOR SML AMT OF THIN WHITE 
SECRETIONS. VENT ALARMS SET AND AUDIBLE. AMBU BAG AT BEDSIDE. VENT PLUGGED INTO RED OUTLET. 
WILL CONTINUE TO MONITOR.

-------------------------------------------------------------------------------

Addendum: 10/17/18 at 2054 by LORENZO LEON RT

-------------------------------------------------------------------------------

Amended: Links added.

## 2018-10-17 NOTE — NUR
Unable to take temp via oral /axillary.Patient hypothermic Temp 94 via nasal route.kept warm 


with warm blanket and Cole Hugger.Continue to monitor.

## 2018-10-17 NOTE — NUR
Patient resting.Hemodynamically remains unstable with Levophed titrated accordingly.Latest 

Temp 97.6.AM care done.Wound care done.BM small pasty brown stool.Turned and repositioned.

Received call from LAB patient HGB 6.9 and Platelet 22 called to NP,Hernán Sanford he said he 
will 

order 1 unit PRBC.

## 2018-10-17 NOTE — NUR
Dr. Chiu made aware of the CT scan of the Head results. Informed him too re: contact 
information of the pt's wife Lisa [phone].

## 2018-10-17 NOTE — NUR
JASMINE Viramontes agreed to restart GTF Nepro x 45 cc/hr (goal rate). Per Dr. Chiu may stop 
current IVF D5Ns x 75 cc/hr.

## 2018-10-17 NOTE — NUR
RN INITIAL NOTES:



Rec'd pt on bed, obtunded. On MV via trach, sating at 96%. On telemonitor, SB/SR/BBB. Has 
GT, flushing well, clamped at this time, meds okay to give. Has LEILANI midline w/ D5NS x 75 
cc/hr & Levo x 7 mcg, infusing well, no s/sx of infection/infiltration noted. Pt s/p HD cath 
removal on LCW yesterday, no active bleeding noted. Provided comfort & safety measures. Bed 
kept low & in locked pos. Call light placed w/in reach. Isolation prec observed. Will cont 
to monitor & attend pt needs.



Pt is for BT of 1 unit PRBC d/t low H/H 6.9/22.

## 2018-10-17 NOTE — NUR
Pt seen & examined by JASMINE Mayes. Informed her that HD cath tip was not sent yesterday. 
Inquired to her if okay to insert new central line as pt has a sched BT & pt is hardstick 
d/t generalized edema & has AVF on L arm. 

-------------------------------------------------------------------------------

Addendum: 10/17/18 at 1622 by SEJAL ROA RN

-------------------------------------------------------------------------------

Addendum: Per JASMINE Mayes pt's line holiday should be at least 48 hours from the time LCW HD 
permacath was removed. Able to insert PL on R hand G20.

## 2018-10-17 NOTE — NUR
Hanna Patten informed MARY that she contacted [facility] and was informed pt. has a wife 
Lisa and her contact is [phone]. MARY contacted the pt's wife Lisa. However Lisa is 
Bahraini speaking, therefore MARY Quijano spoke to the wife. According to wife, she has been 
sick herself and has not been able to visit the pt. She is available via phone to speak with 
the doctor. She informed MARY Quijano that she will try to come by to see the pt. on Friday 
or Saturday if she is not working. Pt. has to take the bus from [place] to [facility]. 



MARY called pt's RN Verónica and updated her with the aforementioned information.

## 2018-10-17 NOTE — NUR
RN CLOSING NOTES:



Pt remains obtunded. On MV via trach, sating at 99%. On telemonitor, still SB/SR/BBB. 
Restarted on GTF Nepro x 20 cc/hr via GT infusing well. LEILANI midline w/ Levo x 3 mcg, 
infusing well, no s/sx of infection/infiltration noted. R hand G20, SL. No bleeding noted on 
LCW s/p HD cath removal. Kept well rested. Needs attended. Bed kept low & in locked pos. 
Call light placed w/in reach. Isolation prec observed. Will endorse to PM RN for ELÍAS.

## 2018-10-18 VITALS — SYSTOLIC BLOOD PRESSURE: 135 MMHG | DIASTOLIC BLOOD PRESSURE: 86 MMHG

## 2018-10-18 VITALS — DIASTOLIC BLOOD PRESSURE: 53 MMHG | SYSTOLIC BLOOD PRESSURE: 108 MMHG

## 2018-10-18 VITALS — SYSTOLIC BLOOD PRESSURE: 112 MMHG | DIASTOLIC BLOOD PRESSURE: 57 MMHG

## 2018-10-18 VITALS — SYSTOLIC BLOOD PRESSURE: 89 MMHG | DIASTOLIC BLOOD PRESSURE: 37 MMHG

## 2018-10-18 VITALS — SYSTOLIC BLOOD PRESSURE: 125 MMHG | DIASTOLIC BLOOD PRESSURE: 59 MMHG

## 2018-10-18 VITALS — SYSTOLIC BLOOD PRESSURE: 115 MMHG | DIASTOLIC BLOOD PRESSURE: 55 MMHG

## 2018-10-18 VITALS — DIASTOLIC BLOOD PRESSURE: 63 MMHG | SYSTOLIC BLOOD PRESSURE: 95 MMHG

## 2018-10-18 VITALS — SYSTOLIC BLOOD PRESSURE: 136 MMHG | DIASTOLIC BLOOD PRESSURE: 54 MMHG

## 2018-10-18 VITALS — DIASTOLIC BLOOD PRESSURE: 59 MMHG | SYSTOLIC BLOOD PRESSURE: 135 MMHG

## 2018-10-18 VITALS — SYSTOLIC BLOOD PRESSURE: 93 MMHG | DIASTOLIC BLOOD PRESSURE: 49 MMHG

## 2018-10-18 VITALS — SYSTOLIC BLOOD PRESSURE: 101 MMHG | DIASTOLIC BLOOD PRESSURE: 43 MMHG

## 2018-10-18 VITALS — DIASTOLIC BLOOD PRESSURE: 57 MMHG | SYSTOLIC BLOOD PRESSURE: 116 MMHG

## 2018-10-18 VITALS — DIASTOLIC BLOOD PRESSURE: 54 MMHG | SYSTOLIC BLOOD PRESSURE: 122 MMHG

## 2018-10-18 VITALS — DIASTOLIC BLOOD PRESSURE: 46 MMHG | SYSTOLIC BLOOD PRESSURE: 89 MMHG

## 2018-10-18 VITALS — DIASTOLIC BLOOD PRESSURE: 41 MMHG | SYSTOLIC BLOOD PRESSURE: 83 MMHG

## 2018-10-18 VITALS — SYSTOLIC BLOOD PRESSURE: 107 MMHG | DIASTOLIC BLOOD PRESSURE: 54 MMHG

## 2018-10-18 VITALS — DIASTOLIC BLOOD PRESSURE: 78 MMHG | SYSTOLIC BLOOD PRESSURE: 90 MMHG

## 2018-10-18 VITALS — SYSTOLIC BLOOD PRESSURE: 126 MMHG | DIASTOLIC BLOOD PRESSURE: 50 MMHG

## 2018-10-18 VITALS — DIASTOLIC BLOOD PRESSURE: 56 MMHG | SYSTOLIC BLOOD PRESSURE: 112 MMHG

## 2018-10-18 VITALS — SYSTOLIC BLOOD PRESSURE: 98 MMHG | DIASTOLIC BLOOD PRESSURE: 71 MMHG

## 2018-10-18 VITALS — DIASTOLIC BLOOD PRESSURE: 51 MMHG | SYSTOLIC BLOOD PRESSURE: 111 MMHG

## 2018-10-18 VITALS — SYSTOLIC BLOOD PRESSURE: 112 MMHG | DIASTOLIC BLOOD PRESSURE: 45 MMHG

## 2018-10-18 VITALS — DIASTOLIC BLOOD PRESSURE: 52 MMHG | SYSTOLIC BLOOD PRESSURE: 109 MMHG

## 2018-10-18 VITALS — SYSTOLIC BLOOD PRESSURE: 109 MMHG | DIASTOLIC BLOOD PRESSURE: 52 MMHG

## 2018-10-18 VITALS — SYSTOLIC BLOOD PRESSURE: 100 MMHG | DIASTOLIC BLOOD PRESSURE: 63 MMHG

## 2018-10-18 VITALS — SYSTOLIC BLOOD PRESSURE: 87 MMHG | DIASTOLIC BLOOD PRESSURE: 43 MMHG

## 2018-10-18 VITALS — DIASTOLIC BLOOD PRESSURE: 54 MMHG | SYSTOLIC BLOOD PRESSURE: 112 MMHG

## 2018-10-18 VITALS — DIASTOLIC BLOOD PRESSURE: 54 MMHG | SYSTOLIC BLOOD PRESSURE: 110 MMHG

## 2018-10-18 VITALS — DIASTOLIC BLOOD PRESSURE: 56 MMHG | SYSTOLIC BLOOD PRESSURE: 90 MMHG

## 2018-10-18 VITALS — DIASTOLIC BLOOD PRESSURE: 55 MMHG | SYSTOLIC BLOOD PRESSURE: 91 MMHG

## 2018-10-18 VITALS — DIASTOLIC BLOOD PRESSURE: 58 MMHG | SYSTOLIC BLOOD PRESSURE: 88 MMHG

## 2018-10-18 VITALS — SYSTOLIC BLOOD PRESSURE: 95 MMHG | DIASTOLIC BLOOD PRESSURE: 41 MMHG

## 2018-10-18 VITALS — DIASTOLIC BLOOD PRESSURE: 55 MMHG | SYSTOLIC BLOOD PRESSURE: 79 MMHG

## 2018-10-18 VITALS — SYSTOLIC BLOOD PRESSURE: 101 MMHG | DIASTOLIC BLOOD PRESSURE: 48 MMHG

## 2018-10-18 VITALS — SYSTOLIC BLOOD PRESSURE: 85 MMHG | DIASTOLIC BLOOD PRESSURE: 43 MMHG

## 2018-10-18 VITALS — SYSTOLIC BLOOD PRESSURE: 113 MMHG | DIASTOLIC BLOOD PRESSURE: 52 MMHG

## 2018-10-18 VITALS — DIASTOLIC BLOOD PRESSURE: 48 MMHG | SYSTOLIC BLOOD PRESSURE: 108 MMHG

## 2018-10-18 VITALS — DIASTOLIC BLOOD PRESSURE: 54 MMHG | SYSTOLIC BLOOD PRESSURE: 96 MMHG

## 2018-10-18 VITALS — DIASTOLIC BLOOD PRESSURE: 45 MMHG | SYSTOLIC BLOOD PRESSURE: 91 MMHG

## 2018-10-18 VITALS — SYSTOLIC BLOOD PRESSURE: 113 MMHG | DIASTOLIC BLOOD PRESSURE: 53 MMHG

## 2018-10-18 VITALS — DIASTOLIC BLOOD PRESSURE: 54 MMHG | SYSTOLIC BLOOD PRESSURE: 113 MMHG

## 2018-10-18 VITALS — SYSTOLIC BLOOD PRESSURE: 108 MMHG | DIASTOLIC BLOOD PRESSURE: 54 MMHG

## 2018-10-18 VITALS — DIASTOLIC BLOOD PRESSURE: 49 MMHG | SYSTOLIC BLOOD PRESSURE: 104 MMHG

## 2018-10-18 VITALS — SYSTOLIC BLOOD PRESSURE: 117 MMHG | DIASTOLIC BLOOD PRESSURE: 53 MMHG

## 2018-10-18 VITALS — SYSTOLIC BLOOD PRESSURE: 120 MMHG | DIASTOLIC BLOOD PRESSURE: 56 MMHG

## 2018-10-18 VITALS — DIASTOLIC BLOOD PRESSURE: 73 MMHG | SYSTOLIC BLOOD PRESSURE: 101 MMHG

## 2018-10-18 VITALS — SYSTOLIC BLOOD PRESSURE: 114 MMHG | DIASTOLIC BLOOD PRESSURE: 50 MMHG

## 2018-10-18 VITALS — DIASTOLIC BLOOD PRESSURE: 54 MMHG | SYSTOLIC BLOOD PRESSURE: 105 MMHG

## 2018-10-18 VITALS — SYSTOLIC BLOOD PRESSURE: 114 MMHG | DIASTOLIC BLOOD PRESSURE: 48 MMHG

## 2018-10-18 VITALS — SYSTOLIC BLOOD PRESSURE: 99 MMHG | DIASTOLIC BLOOD PRESSURE: 45 MMHG

## 2018-10-18 VITALS — DIASTOLIC BLOOD PRESSURE: 40 MMHG | SYSTOLIC BLOOD PRESSURE: 107 MMHG

## 2018-10-18 VITALS — DIASTOLIC BLOOD PRESSURE: 53 MMHG | SYSTOLIC BLOOD PRESSURE: 102 MMHG

## 2018-10-18 VITALS — SYSTOLIC BLOOD PRESSURE: 95 MMHG | DIASTOLIC BLOOD PRESSURE: 66 MMHG

## 2018-10-18 VITALS — SYSTOLIC BLOOD PRESSURE: 111 MMHG | DIASTOLIC BLOOD PRESSURE: 43 MMHG

## 2018-10-18 LAB
ALBUMIN SERPL ELPH-MCNC: 1.9 G/DL (ref 2.9–4.4)
ALBUMIN/GLOB SERPL: 0.7 {RATIO} (ref 0.7–1.7)
ALPHA1 GLOB SERPL ELPH-MCNC: 0.3 G/DL (ref 0–0.4)
ALPHA2 GLOB SERPL ELPH-MCNC: 0.5 G/DL (ref 0.4–1)
B-GLOBULIN SERPL ELPH-MCNC: 0.4 G/DL (ref 0.7–1.3)
BASE EXCESS BLDA CALC-SCNC: -4 MMOL/L
BASOPHILS # BLD AUTO: 0.1 /CMM (ref 0–0.2)
BASOPHILS NFR BLD AUTO: 0.6 % (ref 0–2)
BUN SERPL-MCNC: 61 MG/DL (ref 7–18)
CALCIUM SERPL-MCNC: 9.2 MG/DL (ref 8.5–10.1)
CHLORIDE SERPL-SCNC: 103 MMOL/L (ref 98–107)
CO2 SERPL-SCNC: 20 MMOL/L (ref 21–32)
CREAT SERPL-MCNC: 2.7 MG/DL (ref 0.6–1.3)
DO-HGB MFR BLDA: 161.2 MMHG
EOSINOPHIL NFR BLD AUTO: 5.4 % (ref 0–6)
GAMMA GLOB SERPL ELPH-MCNC: 1.4 G/DL (ref 0.4–1.8)
GLOBULIN SER CALC-MCNC: 2.7 G/DL (ref 2.2–3.9)
GLUCOSE SERPL-MCNC: 97 MG/DL (ref 74–106)
HCT VFR BLD AUTO: 25 % (ref 39–51)
HGB BLD-MCNC: 8.1 G/DL (ref 13.5–17.5)
IGA SERPL-MCNC: 277 MG/DL (ref 90–386)
IGM SERPL-MCNC: 86 MG/DL (ref 20–172)
INHALED O2 CONCENTRATION: 40 %
LYMPHOCYTES NFR BLD AUTO: 1.7 /CMM (ref 0.8–4.8)
LYMPHOCYTES NFR BLD AUTO: 12.7 % (ref 20–44)
MAGNESIUM SERPL-MCNC: 1.8 MG/DL (ref 1.8–2.4)
MCHC RBC AUTO-ENTMCNC: 33 G/DL (ref 31–36)
MCV RBC AUTO: 97 FL (ref 80–96)
MONOCYTES NFR BLD AUTO: 0.7 /CMM (ref 0.1–1.3)
MONOCYTES NFR BLD AUTO: 5 % (ref 2–12)
NEUTROPHILS # BLD AUTO: 10.2 /CMM (ref 1.8–8.9)
NEUTROPHILS NFR BLD AUTO: 76.3 % (ref 43–81)
PCO2 TEMP ADJ BLDA: 29.9 MMHG (ref 35–45)
PEEP SETTING VENT: 550 ML
PH TEMP ADJ BLDA: 7.43 [PH] (ref 7.35–7.45)
PHOSPHATE SERPL-MCNC: 4.6 MG/DL (ref 2.5–4.9)
PLATELET # BLD AUTO: 48 /CMM (ref 150–450)
PO2 TEMP ADJ BLDA: 89.6 MMHG (ref 75–100)
POTASSIUM SERPL-SCNC: 4.1 MMOL/L (ref 3.5–5.1)
RBC # BLD AUTO: 2.57 MIL/UL (ref 4.5–6)
RDW COEFFICIENT OF VARIATION: 24 (ref 11.5–15)
SAO2 % BLDA: 96.2 % (ref 92–98.5)
SET RATE, BG: 24
SODIUM SERPL-SCNC: 134 MMOL/L (ref 136–145)
VENTILATION MODE VENT: (no result)
WBC NRBC COR # BLD AUTO: 13.4 K/UL (ref 4.3–11)

## 2018-10-18 RX ADMIN — MUPIROCIN SCH APPLIC: 20 OINTMENT TOPICAL at 21:01

## 2018-10-18 RX ADMIN — Medication SCH EACH: at 05:49

## 2018-10-18 RX ADMIN — NYSTATIN SCH APPLIC: 100000 CREAM TOPICAL at 01:08

## 2018-10-18 RX ADMIN — VANCOMYCIN HYDROCHLORIDE SCH MG: 125 CAPSULE ORAL at 05:46

## 2018-10-18 RX ADMIN — Medication SCH EACH: at 17:35

## 2018-10-18 RX ADMIN — Medication SCH EACH: at 08:59

## 2018-10-18 RX ADMIN — SODIUM CHLORIDE SCH MG: 9 INJECTION, SOLUTION INTRAVENOUS at 19:16

## 2018-10-18 RX ADMIN — LEVETIRACETAM SCH MG: 250 TABLET, FILM COATED ORAL at 08:59

## 2018-10-18 RX ADMIN — MUPIROCIN SCH APPLIC: 20 OINTMENT TOPICAL at 09:30

## 2018-10-18 RX ADMIN — AMIODARONE HYDROCHLORIDE SCH MG: 200 TABLET ORAL at 12:54

## 2018-10-18 RX ADMIN — VANCOMYCIN HYDROCHLORIDE SCH MG: 125 CAPSULE ORAL at 12:02

## 2018-10-18 RX ADMIN — Medication SCH OZ: at 09:31

## 2018-10-18 RX ADMIN — SODIUM CHLORIDE SCH MG: 9 INJECTION, SOLUTION INTRAVENOUS at 23:20

## 2018-10-18 RX ADMIN — AMIODARONE HYDROCHLORIDE SCH MG: 200 TABLET ORAL at 17:31

## 2018-10-18 RX ADMIN — Medication SCH EACH: at 00:54

## 2018-10-18 RX ADMIN — SODIUM CHLORIDE SCH MLS/HR: 9 INJECTION, SOLUTION INTRAVENOUS at 11:58

## 2018-10-18 RX ADMIN — VANCOMYCIN HYDROCHLORIDE SCH MG: 125 CAPSULE ORAL at 17:35

## 2018-10-18 RX ADMIN — DEXTROSE MONOHYDRATE PRN MLS/HR: 50 INJECTION, SOLUTION INTRAVENOUS at 16:23

## 2018-10-18 RX ADMIN — LEVETIRACETAM SCH MG: 250 TABLET, FILM COATED ORAL at 17:35

## 2018-10-18 RX ADMIN — Medication SCH EACH: at 12:26

## 2018-10-18 RX ADMIN — DEXTROSE MONOHYDRATE PRN ML: 500 INJECTION PARENTERAL at 18:02

## 2018-10-18 RX ADMIN — Medication SCH MLS/HR: at 01:06

## 2018-10-18 RX ADMIN — TOBRAMYCIN AND DEXAMETHASONE SCH DROP: 3; 1 SUSPENSION/ DROPS OPHTHALMIC at 09:28

## 2018-10-18 RX ADMIN — AMIODARONE HYDROCHLORIDE SCH MG: 200 TABLET ORAL at 09:00

## 2018-10-18 RX ADMIN — Medication SCH EACH: at 09:27

## 2018-10-18 RX ADMIN — TOBRAMYCIN AND DEXAMETHASONE SCH DROP: 3; 1 SUSPENSION/ DROPS OPHTHALMIC at 17:26

## 2018-10-18 RX ADMIN — LEVOTHYROXINE SODIUM SCH MCG: 75 TABLET ORAL at 08:59

## 2018-10-18 RX ADMIN — VANCOMYCIN HYDROCHLORIDE SCH MG: 125 CAPSULE ORAL at 00:54

## 2018-10-18 RX ADMIN — Medication SCH MLS/HR: at 17:26

## 2018-10-18 RX ADMIN — Medication SCH MLS/HR: at 09:01

## 2018-10-18 RX ADMIN — NYSTATIN SCH APPLIC: 100000 CREAM TOPICAL at 12:56

## 2018-10-18 RX ADMIN — Medication SCH EACH: at 17:51

## 2018-10-18 RX ADMIN — SODIUM CHLORIDE SCH MG: 9 INJECTION, SOLUTION INTRAVENOUS at 09:01

## 2018-10-18 RX ADMIN — VANCOMYCIN HYDROCHLORIDE SCH MG: 125 CAPSULE ORAL at 23:21

## 2018-10-18 NOTE — NUR
received  pts in bed  obtanded  non responsive ,remains on vent  dependent  ac settings well 
tolerated , no sob no distress noted , on levo at 5mcq/min v/s on 90/40  temp 36.3  pts on  
warm  blanket , pts is npo except meds  gt  connected to low suction  draining with gastric  
content , abdomen distended  but soft  , pts on piccline on harvinder intact and patent , suction 
secretion done noted productive thick  sputum .all needs attended too call light within 
reach  kept pts clean dry and comfortable, will continue to  monitor pts .

## 2018-10-18 NOTE — NUR
ICU RN: GTF STILL ON HOLD DUE TO HIGH RESIDUAL (ABOVE 250CC). NO S/S OF HYPOGLYCEMIA, BS 
WITHIN NL. CONTINUE ON LEVOPHED AT 3MCG/MIN. WILL CONTINUE TO MONITOR.

## 2018-10-18 NOTE — NUR
PATIENT REMAINS OBTUNDED, NO COUGH, NO GAG REFLEX, PUPILS FIXED- NEUROLOGY CONSULTED, 
PENDING EEG. WIFE CONTACTED EARLY THIS MORNING, BUT SHE STATED SHE DID NOT HAVE TIME TO TALK 
THIS MORNING, SHE WAS ON HER WAY TO WORK. DR. REDMOND WANTED TO SPEAK TO HER RE: PATIENT'S 
STATUS. SHE PREFERRED TO BE CONTACTED THIS AFTERNOON.



TUBE FEED CONTINUES ON HOLD, LAST RESIDUAL CHECKED AT 12PM, RESIDUAL >450. FEEDS CONTINUED 
ON HOLD. X1 LOOSE BM - 



NO BLEEDING NOTED TO UPPER LEFT NECK ( HD CATH REMOVED) NO TRANSFUSION OF PLATES AS PER DR. BAUMAN'S ORDERS. 



PATIENT CONTINUES ON LEVOPHED UP TO 4 MGS/MIN- TO MAINTAIN MAP>65 SBP>90, PATIENT'S TEMP 
DOWN TO 95.8 CURRENTLY ON PARVIZ WARMER TEMPERATURE IMPROVED TO 96.2



SKIN AND SAFETY PRECAUTIONS MAINTAINED.

## 2018-10-18 NOTE — NUR
ICU RN: NOTIFIED JASMINE OWUSU OF GT RESIDUAL STILL HIGH AT 400CC AT MIDNIGHT. NP WT ORDER TO 
HOLD GTF AT THIS TIME AND NO NEED FOR REGLAN OR IVF AND TO GIVE D50 IVP PER PROTOCOL/AS 
NEEDED.

## 2018-10-18 NOTE — NUR
ICU CRN Lio requested for MARY to contact the wife Lisa to explain to her the seriousness of 
pt's condition at this time as he feels pt's wife is not aware. Pt's wife Lisa is Georgian 
speaking. MARY Quijano tried to contact pt's wife Lisa [phone] however her phone is 
off and MARY was unable to leave a message. MARY updated ICU CRN Lio with the aforementioned 
information.

## 2018-10-18 NOTE — NUR
icu rn notes 

v/s 79/50  at 1945 hrs  levo increased to 7mcg/min will continue to monitor.

-------------------------------------------------------------------------------

Addendum: 10/18/18 at 2218 by ANNMARIE SORIA RN

-------------------------------------------------------------------------------

timed documented  meant to 1945 hrs

## 2018-10-18 NOTE — NUR
RECEIVED PT TRACHED ON VENT. PT TOLERATING VENT SETTINGS. SX'D FOR MOD AMT OF THICK YELLOW 
SECRETIONS. VENT ALARMS SET AND AUDIBLE. TRACH CUFF MLT, SECURED. VENT PLUGGED INTO RED 
OUTLET. AMBU BAG AT BEDSIDE. WILL CONTINUE TO MONITOR.

-------------------------------------------------------------------------------

Addendum: 10/18/18 at 1950 by LORENZO LEON RT

-------------------------------------------------------------------------------

Amended: Links added.

## 2018-10-18 NOTE — NUR
icu rn notes 

v/s  still  77/56  at levo increase again to 9mcg  at 1955hrs. will continue to monitor.

## 2018-10-19 VITALS — SYSTOLIC BLOOD PRESSURE: 104 MMHG | DIASTOLIC BLOOD PRESSURE: 46 MMHG

## 2018-10-19 VITALS — SYSTOLIC BLOOD PRESSURE: 99 MMHG | DIASTOLIC BLOOD PRESSURE: 53 MMHG

## 2018-10-19 VITALS — DIASTOLIC BLOOD PRESSURE: 46 MMHG | SYSTOLIC BLOOD PRESSURE: 84 MMHG

## 2018-10-19 VITALS — DIASTOLIC BLOOD PRESSURE: 45 MMHG | SYSTOLIC BLOOD PRESSURE: 69 MMHG

## 2018-10-19 VITALS — DIASTOLIC BLOOD PRESSURE: 42 MMHG | SYSTOLIC BLOOD PRESSURE: 83 MMHG

## 2018-10-19 VITALS — SYSTOLIC BLOOD PRESSURE: 86 MMHG | DIASTOLIC BLOOD PRESSURE: 45 MMHG

## 2018-10-19 VITALS — DIASTOLIC BLOOD PRESSURE: 35 MMHG | SYSTOLIC BLOOD PRESSURE: 77 MMHG

## 2018-10-19 VITALS — DIASTOLIC BLOOD PRESSURE: 53 MMHG | SYSTOLIC BLOOD PRESSURE: 98 MMHG

## 2018-10-19 VITALS — DIASTOLIC BLOOD PRESSURE: 50 MMHG | SYSTOLIC BLOOD PRESSURE: 99 MMHG

## 2018-10-19 VITALS — SYSTOLIC BLOOD PRESSURE: 100 MMHG | DIASTOLIC BLOOD PRESSURE: 51 MMHG

## 2018-10-19 VITALS — SYSTOLIC BLOOD PRESSURE: 90 MMHG | DIASTOLIC BLOOD PRESSURE: 47 MMHG

## 2018-10-19 VITALS — DIASTOLIC BLOOD PRESSURE: 52 MMHG | SYSTOLIC BLOOD PRESSURE: 99 MMHG

## 2018-10-19 VITALS — SYSTOLIC BLOOD PRESSURE: 94 MMHG | DIASTOLIC BLOOD PRESSURE: 48 MMHG

## 2018-10-19 VITALS — DIASTOLIC BLOOD PRESSURE: 53 MMHG | SYSTOLIC BLOOD PRESSURE: 105 MMHG

## 2018-10-19 VITALS — SYSTOLIC BLOOD PRESSURE: 85 MMHG | DIASTOLIC BLOOD PRESSURE: 45 MMHG

## 2018-10-19 VITALS — DIASTOLIC BLOOD PRESSURE: 40 MMHG | SYSTOLIC BLOOD PRESSURE: 100 MMHG

## 2018-10-19 VITALS — DIASTOLIC BLOOD PRESSURE: 45 MMHG | SYSTOLIC BLOOD PRESSURE: 87 MMHG

## 2018-10-19 VITALS — DIASTOLIC BLOOD PRESSURE: 51 MMHG | SYSTOLIC BLOOD PRESSURE: 84 MMHG

## 2018-10-19 VITALS — SYSTOLIC BLOOD PRESSURE: 91 MMHG | DIASTOLIC BLOOD PRESSURE: 54 MMHG

## 2018-10-19 VITALS — DIASTOLIC BLOOD PRESSURE: 52 MMHG | SYSTOLIC BLOOD PRESSURE: 117 MMHG

## 2018-10-19 VITALS — DIASTOLIC BLOOD PRESSURE: 48 MMHG | SYSTOLIC BLOOD PRESSURE: 90 MMHG

## 2018-10-19 VITALS — SYSTOLIC BLOOD PRESSURE: 83 MMHG | DIASTOLIC BLOOD PRESSURE: 38 MMHG

## 2018-10-19 VITALS — DIASTOLIC BLOOD PRESSURE: 49 MMHG | SYSTOLIC BLOOD PRESSURE: 109 MMHG

## 2018-10-19 VITALS — SYSTOLIC BLOOD PRESSURE: 83 MMHG | DIASTOLIC BLOOD PRESSURE: 53 MMHG

## 2018-10-19 VITALS — DIASTOLIC BLOOD PRESSURE: 47 MMHG | SYSTOLIC BLOOD PRESSURE: 83 MMHG

## 2018-10-19 VITALS — SYSTOLIC BLOOD PRESSURE: 76 MMHG | DIASTOLIC BLOOD PRESSURE: 44 MMHG

## 2018-10-19 VITALS — DIASTOLIC BLOOD PRESSURE: 42 MMHG | SYSTOLIC BLOOD PRESSURE: 87 MMHG

## 2018-10-19 VITALS — DIASTOLIC BLOOD PRESSURE: 63 MMHG | SYSTOLIC BLOOD PRESSURE: 120 MMHG

## 2018-10-19 VITALS — DIASTOLIC BLOOD PRESSURE: 47 MMHG | SYSTOLIC BLOOD PRESSURE: 96 MMHG

## 2018-10-19 VITALS — DIASTOLIC BLOOD PRESSURE: 54 MMHG | SYSTOLIC BLOOD PRESSURE: 130 MMHG

## 2018-10-19 VITALS — DIASTOLIC BLOOD PRESSURE: 55 MMHG | SYSTOLIC BLOOD PRESSURE: 85 MMHG

## 2018-10-19 VITALS — DIASTOLIC BLOOD PRESSURE: 57 MMHG | SYSTOLIC BLOOD PRESSURE: 114 MMHG

## 2018-10-19 VITALS — DIASTOLIC BLOOD PRESSURE: 42 MMHG | SYSTOLIC BLOOD PRESSURE: 82 MMHG

## 2018-10-19 VITALS — DIASTOLIC BLOOD PRESSURE: 44 MMHG | SYSTOLIC BLOOD PRESSURE: 74 MMHG

## 2018-10-19 VITALS — DIASTOLIC BLOOD PRESSURE: 52 MMHG | SYSTOLIC BLOOD PRESSURE: 87 MMHG

## 2018-10-19 VITALS — SYSTOLIC BLOOD PRESSURE: 88 MMHG | DIASTOLIC BLOOD PRESSURE: 49 MMHG

## 2018-10-19 VITALS — SYSTOLIC BLOOD PRESSURE: 100 MMHG | DIASTOLIC BLOOD PRESSURE: 40 MMHG

## 2018-10-19 VITALS — SYSTOLIC BLOOD PRESSURE: 100 MMHG | DIASTOLIC BLOOD PRESSURE: 50 MMHG

## 2018-10-19 VITALS — SYSTOLIC BLOOD PRESSURE: 102 MMHG | DIASTOLIC BLOOD PRESSURE: 49 MMHG

## 2018-10-19 VITALS — DIASTOLIC BLOOD PRESSURE: 37 MMHG | SYSTOLIC BLOOD PRESSURE: 94 MMHG

## 2018-10-19 VITALS — DIASTOLIC BLOOD PRESSURE: 47 MMHG | SYSTOLIC BLOOD PRESSURE: 89 MMHG

## 2018-10-19 VITALS — DIASTOLIC BLOOD PRESSURE: 43 MMHG | SYSTOLIC BLOOD PRESSURE: 129 MMHG

## 2018-10-19 VITALS — DIASTOLIC BLOOD PRESSURE: 54 MMHG | SYSTOLIC BLOOD PRESSURE: 85 MMHG

## 2018-10-19 VITALS — DIASTOLIC BLOOD PRESSURE: 49 MMHG | SYSTOLIC BLOOD PRESSURE: 100 MMHG

## 2018-10-19 VITALS — SYSTOLIC BLOOD PRESSURE: 106 MMHG | DIASTOLIC BLOOD PRESSURE: 86 MMHG

## 2018-10-19 VITALS — DIASTOLIC BLOOD PRESSURE: 57 MMHG | SYSTOLIC BLOOD PRESSURE: 102 MMHG

## 2018-10-19 VITALS — SYSTOLIC BLOOD PRESSURE: 91 MMHG | DIASTOLIC BLOOD PRESSURE: 44 MMHG

## 2018-10-19 VITALS — DIASTOLIC BLOOD PRESSURE: 49 MMHG | SYSTOLIC BLOOD PRESSURE: 95 MMHG

## 2018-10-19 VITALS — SYSTOLIC BLOOD PRESSURE: 92 MMHG | DIASTOLIC BLOOD PRESSURE: 42 MMHG

## 2018-10-19 VITALS — DIASTOLIC BLOOD PRESSURE: 39 MMHG | SYSTOLIC BLOOD PRESSURE: 87 MMHG

## 2018-10-19 VITALS — SYSTOLIC BLOOD PRESSURE: 85 MMHG | DIASTOLIC BLOOD PRESSURE: 58 MMHG

## 2018-10-19 VITALS — DIASTOLIC BLOOD PRESSURE: 73 MMHG | SYSTOLIC BLOOD PRESSURE: 105 MMHG

## 2018-10-19 VITALS — SYSTOLIC BLOOD PRESSURE: 132 MMHG | DIASTOLIC BLOOD PRESSURE: 67 MMHG

## 2018-10-19 VITALS — SYSTOLIC BLOOD PRESSURE: 80 MMHG | DIASTOLIC BLOOD PRESSURE: 52 MMHG

## 2018-10-19 VITALS — DIASTOLIC BLOOD PRESSURE: 69 MMHG | SYSTOLIC BLOOD PRESSURE: 96 MMHG

## 2018-10-19 VITALS — DIASTOLIC BLOOD PRESSURE: 58 MMHG | SYSTOLIC BLOOD PRESSURE: 104 MMHG

## 2018-10-19 VITALS — DIASTOLIC BLOOD PRESSURE: 50 MMHG | SYSTOLIC BLOOD PRESSURE: 100 MMHG

## 2018-10-19 VITALS — SYSTOLIC BLOOD PRESSURE: 105 MMHG | DIASTOLIC BLOOD PRESSURE: 49 MMHG

## 2018-10-19 VITALS — DIASTOLIC BLOOD PRESSURE: 70 MMHG | SYSTOLIC BLOOD PRESSURE: 91 MMHG

## 2018-10-19 VITALS — SYSTOLIC BLOOD PRESSURE: 89 MMHG | DIASTOLIC BLOOD PRESSURE: 59 MMHG

## 2018-10-19 VITALS — SYSTOLIC BLOOD PRESSURE: 74 MMHG | DIASTOLIC BLOOD PRESSURE: 44 MMHG

## 2018-10-19 VITALS — DIASTOLIC BLOOD PRESSURE: 50 MMHG | SYSTOLIC BLOOD PRESSURE: 83 MMHG

## 2018-10-19 VITALS — SYSTOLIC BLOOD PRESSURE: 86 MMHG | DIASTOLIC BLOOD PRESSURE: 54 MMHG

## 2018-10-19 VITALS — DIASTOLIC BLOOD PRESSURE: 50 MMHG | SYSTOLIC BLOOD PRESSURE: 94 MMHG

## 2018-10-19 VITALS — SYSTOLIC BLOOD PRESSURE: 78 MMHG | DIASTOLIC BLOOD PRESSURE: 53 MMHG

## 2018-10-19 VITALS — SYSTOLIC BLOOD PRESSURE: 94 MMHG | DIASTOLIC BLOOD PRESSURE: 43 MMHG

## 2018-10-19 VITALS — SYSTOLIC BLOOD PRESSURE: 94 MMHG | DIASTOLIC BLOOD PRESSURE: 50 MMHG

## 2018-10-19 VITALS — SYSTOLIC BLOOD PRESSURE: 92 MMHG | DIASTOLIC BLOOD PRESSURE: 46 MMHG

## 2018-10-19 VITALS — DIASTOLIC BLOOD PRESSURE: 48 MMHG | SYSTOLIC BLOOD PRESSURE: 101 MMHG

## 2018-10-19 VITALS — SYSTOLIC BLOOD PRESSURE: 106 MMHG | DIASTOLIC BLOOD PRESSURE: 68 MMHG

## 2018-10-19 VITALS — SYSTOLIC BLOOD PRESSURE: 105 MMHG | DIASTOLIC BLOOD PRESSURE: 45 MMHG

## 2018-10-19 VITALS — SYSTOLIC BLOOD PRESSURE: 82 MMHG | DIASTOLIC BLOOD PRESSURE: 51 MMHG

## 2018-10-19 VITALS — DIASTOLIC BLOOD PRESSURE: 70 MMHG | SYSTOLIC BLOOD PRESSURE: 123 MMHG

## 2018-10-19 VITALS — SYSTOLIC BLOOD PRESSURE: 99 MMHG | DIASTOLIC BLOOD PRESSURE: 55 MMHG

## 2018-10-19 VITALS — DIASTOLIC BLOOD PRESSURE: 47 MMHG | SYSTOLIC BLOOD PRESSURE: 85 MMHG

## 2018-10-19 VITALS — DIASTOLIC BLOOD PRESSURE: 48 MMHG | SYSTOLIC BLOOD PRESSURE: 105 MMHG

## 2018-10-19 VITALS — SYSTOLIC BLOOD PRESSURE: 85 MMHG | DIASTOLIC BLOOD PRESSURE: 42 MMHG

## 2018-10-19 LAB
BASE EXCESS BLDA CALC-SCNC: -7.2 MMOL/L
BASOPHILS # BLD AUTO: 0.1 /CMM (ref 0–0.2)
BASOPHILS # BLD AUTO: 0.1 /CMM (ref 0–0.2)
BASOPHILS NFR BLD AUTO: 0.5 % (ref 0–2)
BASOPHILS NFR BLD AUTO: 0.6 % (ref 0–2)
BILIRUB DIRECT SERPL-MCNC: 0.7 MG/DL (ref 0–0.2)
BILIRUB SERPL-MCNC: 1.1 MG/DL (ref 0.2–1)
BUN SERPL-MCNC: 71 MG/DL (ref 7–18)
CALCIUM SERPL-MCNC: 10.5 MG/DL (ref 8.5–10.1)
CHLORIDE SERPL-SCNC: 103 MMOL/L (ref 98–107)
CO2 SERPL-SCNC: 17 MMOL/L (ref 21–32)
CREAT SERPL-MCNC: 3.1 MG/DL (ref 0.6–1.3)
DO-HGB MFR BLDA: 190.8 MMHG
EOSINOPHIL NFR BLD AUTO: 4.6 % (ref 0–6)
EOSINOPHIL NFR BLD AUTO: 5.3 % (ref 0–6)
EOSINOPHIL NFR BLD MANUAL: 5 % (ref 0–4)
GLUCOSE SERPL-MCNC: 77 MG/DL (ref 74–106)
HCT VFR BLD AUTO: 28 % (ref 39–51)
HCT VFR BLD AUTO: 29 % (ref 39–51)
HGB BLD-MCNC: 9 G/DL (ref 13.5–17.5)
HGB BLD-MCNC: 9.5 G/DL (ref 13.5–17.5)
INHALED O2 CONCENTRATION: 40 %
LYMPHOCYTES NFR BLD AUTO: 10.5 % (ref 20–44)
LYMPHOCYTES NFR BLD AUTO: 2 /CMM (ref 0.8–4.8)
LYMPHOCYTES NFR BLD AUTO: 2.2 /CMM (ref 0.8–4.8)
LYMPHOCYTES NFR BLD AUTO: 9.9 % (ref 20–44)
LYMPHOCYTES NFR BLD MANUAL: 9 % (ref 16–48)
MCHC RBC AUTO-ENTMCNC: 33 G/DL (ref 31–36)
MCHC RBC AUTO-ENTMCNC: 33 G/DL (ref 31–36)
MCV RBC AUTO: 97 FL (ref 80–96)
MCV RBC AUTO: 98 FL (ref 80–96)
MONOCYTES NFR BLD AUTO: 1.4 /CMM (ref 0.1–1.3)
MONOCYTES NFR BLD AUTO: 1.6 /CMM (ref 0.1–1.3)
MONOCYTES NFR BLD AUTO: 6.7 % (ref 2–12)
MONOCYTES NFR BLD AUTO: 7.9 % (ref 2–12)
MONOCYTES NFR BLD MANUAL: 6 % (ref 0–11)
NEUTROPHILS # BLD AUTO: 15.3 /CMM (ref 1.8–8.9)
NEUTROPHILS # BLD AUTO: 16.2 /CMM (ref 1.8–8.9)
NEUTROPHILS NFR BLD AUTO: 76.4 % (ref 43–81)
NEUTROPHILS NFR BLD AUTO: 77.6 % (ref 43–81)
NEUTS BAND NFR BLD MANUAL: 3 % (ref 0–5)
NEUTS SEG NFR BLD MANUAL: 77 % (ref 42–76)
PCO2 TEMP ADJ BLDA: 30 MMHG (ref 35–45)
PH TEMP ADJ BLDA: 7.37 [PH] (ref 7.35–7.45)
PLATELET # BLD AUTO: 59 /CMM (ref 150–450)
PLATELET # BLD AUTO: 59 /CMM (ref 150–450)
PO2 TEMP ADJ BLDA: 59.9 MMHG (ref 75–100)
POTASSIUM SERPL-SCNC: 4.5 MMOL/L (ref 3.5–5.1)
RBC # BLD AUTO: 2.82 MIL/UL (ref 4.5–6)
RBC # BLD AUTO: 2.97 MIL/UL (ref 4.5–6)
SAO2 % BLDA: 89.1 % (ref 92–98.5)
SODIUM SERPL-SCNC: 131 MMOL/L (ref 136–145)
VENTILATION MODE VENT: (no result)
WBC NRBC COR # BLD AUTO: 20 K/UL (ref 4.3–11)
WBC NRBC COR # BLD AUTO: 20.9 K/UL (ref 4.3–11)

## 2018-10-19 RX ADMIN — Medication SCH EACH: at 05:00

## 2018-10-19 RX ADMIN — INSULIN HUMAN PRN UNIT: 100 INJECTION, SOLUTION PARENTERAL at 00:57

## 2018-10-19 RX ADMIN — Medication SCH EACH: at 12:28

## 2018-10-19 RX ADMIN — Medication SCH MLS/HR: at 08:48

## 2018-10-19 RX ADMIN — MUPIROCIN SCH APPLIC: 20 OINTMENT TOPICAL at 08:48

## 2018-10-19 RX ADMIN — Medication PRN ML: at 12:34

## 2018-10-19 RX ADMIN — Medication SCH OZ: at 08:49

## 2018-10-19 RX ADMIN — DEXTROSE MONOHYDRATE PRN MLS/HR: 50 INJECTION, SOLUTION INTRAVENOUS at 17:45

## 2018-10-19 RX ADMIN — LEVETIRACETAM SCH MG: 250 TABLET, FILM COATED ORAL at 17:05

## 2018-10-19 RX ADMIN — SODIUM CHLORIDE SCH MLS/HR: 9 INJECTION, SOLUTION INTRAVENOUS at 12:28

## 2018-10-19 RX ADMIN — Medication SCH EACH: at 09:05

## 2018-10-19 RX ADMIN — LEVOTHYROXINE SODIUM SCH MCG: 75 TABLET ORAL at 08:48

## 2018-10-19 RX ADMIN — Medication SCH MLS/HR: at 23:56

## 2018-10-19 RX ADMIN — Medication SCH MLS/HR: at 17:05

## 2018-10-19 RX ADMIN — MUPIROCIN SCH APPLIC: 20 OINTMENT TOPICAL at 20:59

## 2018-10-19 RX ADMIN — VANCOMYCIN HYDROCHLORIDE SCH MG: 125 CAPSULE ORAL at 17:06

## 2018-10-19 RX ADMIN — NYSTATIN SCH APPLIC: 100000 CREAM TOPICAL at 13:25

## 2018-10-19 RX ADMIN — Medication SCH EACH: at 17:16

## 2018-10-19 RX ADMIN — Medication SCH MLS/HR: at 00:56

## 2018-10-19 RX ADMIN — AMIODARONE HYDROCHLORIDE SCH MG: 200 TABLET ORAL at 12:28

## 2018-10-19 RX ADMIN — SODIUM CHLORIDE SCH MG: 9 INJECTION, SOLUTION INTRAVENOUS at 17:05

## 2018-10-19 RX ADMIN — Medication SCH EACH: at 00:57

## 2018-10-19 RX ADMIN — NYSTATIN SCH APPLIC: 100000 CREAM TOPICAL at 02:25

## 2018-10-19 RX ADMIN — VANCOMYCIN HYDROCHLORIDE SCH MG: 125 CAPSULE ORAL at 12:28

## 2018-10-19 RX ADMIN — VANCOMYCIN HYDROCHLORIDE SCH MG: 125 CAPSULE ORAL at 05:22

## 2018-10-19 RX ADMIN — SODIUM CHLORIDE SCH MG: 9 INJECTION, SOLUTION INTRAVENOUS at 08:48

## 2018-10-19 RX ADMIN — DEXTROSE AND SODIUM CHLORIDE PRN MLS/HR: 5; 900 INJECTION, SOLUTION INTRAVENOUS at 09:31

## 2018-10-19 RX ADMIN — DEXTROSE MONOHYDRATE PRN ML: 500 INJECTION PARENTERAL at 09:23

## 2018-10-19 RX ADMIN — SODIUM CHLORIDE SCH MG: 9 INJECTION, SOLUTION INTRAVENOUS at 12:28

## 2018-10-19 RX ADMIN — Medication SCH EACH: at 20:57

## 2018-10-19 RX ADMIN — TOBRAMYCIN AND DEXAMETHASONE SCH DROP: 3; 1 SUSPENSION/ DROPS OPHTHALMIC at 17:06

## 2018-10-19 RX ADMIN — TOBRAMYCIN AND DEXAMETHASONE SCH DROP: 3; 1 SUSPENSION/ DROPS OPHTHALMIC at 08:48

## 2018-10-19 RX ADMIN — AMIODARONE HYDROCHLORIDE SCH MG: 200 TABLET ORAL at 08:48

## 2018-10-19 RX ADMIN — VANCOMYCIN HYDROCHLORIDE SCH MG: 125 CAPSULE ORAL at 23:57

## 2018-10-19 RX ADMIN — LEVETIRACETAM SCH MG: 250 TABLET, FILM COATED ORAL at 08:49

## 2018-10-19 RX ADMIN — SODIUM CHLORIDE SCH MG: 9 INJECTION, SOLUTION INTRAVENOUS at 23:56

## 2018-10-19 RX ADMIN — Medication SCH EACH: at 08:48

## 2018-10-19 RX ADMIN — AMIODARONE HYDROCHLORIDE SCH MG: 200 TABLET ORAL at 17:06

## 2018-10-19 RX ADMIN — INSULIN HUMAN PRN UNIT: 100 INJECTION, SOLUTION PARENTERAL at 05:23

## 2018-10-19 RX ADMIN — SODIUM CHLORIDE SCH MG: 9 INJECTION, SOLUTION INTRAVENOUS at 05:22

## 2018-10-19 RX ADMIN — Medication SCH EACH: at 17:05

## 2018-10-19 NOTE — NUR
ICU/LVN

PT'S ACCU CHECK IS 78, THERE IS NO COVERAGE FOR THIS PER MD'S ORDERS AND HOSPITAL PROTOCOL. 
WILL CONTINUE TO MONITOR THIS PT'S SUGARS AS ORDERS. PT WAS TURNED AND REPOSITIONED FOR 
COMFORT AND CARE.

## 2018-10-19 NOTE — NUR
ICU/LVN

RECEIVED REPORT FROM DAY NURSE. PATIENT IS OBTUND. PT IS TOLERATING CURRENT VENT SETTINGS, 
WITH SATURATION IS 90'S %.  PT IS NSR .  IV ON RIGHT UPPER ARM MIDLINE.PT G/TUBE FEEDING ON 
HOLD DUE TO LARGE VOLUMES OF RESIDUALS.  PT HAS NO URINE, ANURIC. PT HAS A FEW SKIN ISSUES 
THAT ARE ADDRESSED ON FLOWSHEET. PT IS IS CURRENTLY ON LEVO TO KEEP SBP ABOVE 90'S. PT WAS 
TURNED AND REPOSITIONED FOR COMFORT AND CARE, WILL CONTINUE TO MONITOR THIS.

## 2018-10-19 NOTE — NUR
ICU RN NOTES

RECEIVED PATIENT COMATOSE , NON RESPONSIVE , ON MECHANICAL VENT SETTINGS AS ORDERED WITH 
SPO2 % , SR 75 ON BEDSIDE MONITOR , GT PATENT AND INTACT ON LOW INTERMITTENT SUCTION 
DRAINING WITH BROWNISH OUTPUT , LEILANI MIDLINE WITH LEVOPHED @ 9MCG/MIN INFUSING WELL , WILL 
CONTINUE TO MONITOR

## 2018-10-19 NOTE — NUR
MARY Quijano called pt's wife Lisa again and was able to speak with her. [phone] 
Macie informed pt' s wife that urgency regarding the doctor wanting to speak to her 
regarding pt's medical condition. MARY also informed her that the hospital would arrange for a 
taxi (approved by Jose) to pick pt. up to come to the hospital to meet with Dr. Chiu. 
Jennifer informed SW that she is working tomorrow but needs to speak to her boss to see when 
she can take time off. She informed SW she will call pt's RN in ICU after 5:30PM to inform 
what day she is able to come to the hospital.

## 2018-10-19 NOTE — NUR
ICU RN NOTES

NOTIFIED DR BOWENS THAT PT HAS NO RESIDUALS VIA LOW INTERMITTENT SUCTION , PER MD MYLES TO 
START GT FEEDING ,

## 2018-10-19 NOTE — NUR
ICU/LVN

PT'S BLOOD PRESSURE HAS BEEN IN THE HIGH 80'S FOR A FEW CYCLES, NOTIFIED CHARGE NURSE RN WHO 
THEN INCREASED THE LEVO TO 18 MCG FROM 16MCG. WILL CONTINUE TO MONITOR THIS PT'S BP.

## 2018-10-19 NOTE — NUR
ICU RN NOTES

SEEN AND EVALUATED BY DR REDMOND DISCUSSED LABS , CHEST XRAY , PT UNRESPONSIVE (-) GAG AND 
COUGHING REFLEX ,  , PENDING CBC , AFEBRILE , ON LEVOPHED @ 9MCG/MIN , GT ON LOW 
INTERMITTENT SUCTION DRAINING WITH BROWNISH OUTPUT , WITH EPISODES OF HYPOGLYCEMIA THIS AM , 
GT FEEDING ON HOLD , NO HD NOTED AS PT IS ON LINE HOLIDAY  PER MD START D5NS @ 50ML/HR , 
ORDER CARRIED OUT

## 2018-10-19 NOTE — NUR
icu rn notes 

blood sugar for 0100 hrs is 70 mg/dl  no coverage given  per sliding scale.pts on npo status 
. will check blood sugar again at 6am.continue on gt to low suction.

## 2018-10-19 NOTE — NUR
ICU RN NOTES

GT FEEDING OF NEPHRO @ 45ML/HR HELD , 300ML GASTRIC FEEDING RESIDUALS NOTED , RESIDUALS 
PUSHED BACK , IVF OF D5NS @ 50ML RESTARTED .

## 2018-10-19 NOTE — NUR
MARY Quijano left a voicemail message [phone] for pt's wife Lisa to call MARY back. MARY 
left a callback number.

## 2018-10-19 NOTE — NUR
icu rn nurse

blood sugar for 5am is  61 mg/dl  no insulin coverage given per sliding  scale , cranberry 
juice  given  via gt.

## 2018-10-19 NOTE — NUR
ICU RN NOTES

LOW INTERMITTENT SUCTION HELD , NO RESIDUALS NOTED , STARTED PT ON GT FEEDING OF NEPHRO @ 
30ML/HR WILL TITRATE ACCORDINGLY , IVF HELD ,

## 2018-10-19 NOTE — NUR
RECEIVED PT TRACHED PORTEX 9 ON VENT WITH NOTED SETTINGS OF AC 24,550,PEEP 0, 40%. PT 
TOLERATING VENT SETTINGS.   PT IS OBTUNDED.  SUCTIONED COPIOUS AMOUNT OF  YELLOW THICK  
SECRETIONS. VENT ALARMS SET AND AUDIBLE. TRACH PATENT AND SECURED, CUFF MLT. VENT PLUGGED 
INTO RED OUTLET.  NO RESPIRATORY DISTRESS NOTED AT THIS TIME .WILL CONTINUE TO MONITOR.

-------------------------------------------------------------------------------

Addendum: 10/20/18 at 0146 by SEAN LAWRENCE RT

-------------------------------------------------------------------------------

PT IS COMATOSE NOT OBTUNDED, WITH  NEGATIVE GAG REFLEX.

## 2018-10-20 VITALS — DIASTOLIC BLOOD PRESSURE: 41 MMHG | SYSTOLIC BLOOD PRESSURE: 83 MMHG

## 2018-10-20 VITALS — SYSTOLIC BLOOD PRESSURE: 89 MMHG | DIASTOLIC BLOOD PRESSURE: 45 MMHG

## 2018-10-20 VITALS — SYSTOLIC BLOOD PRESSURE: 65 MMHG | DIASTOLIC BLOOD PRESSURE: 36 MMHG

## 2018-10-20 VITALS — SYSTOLIC BLOOD PRESSURE: 91 MMHG | DIASTOLIC BLOOD PRESSURE: 44 MMHG

## 2018-10-20 VITALS — DIASTOLIC BLOOD PRESSURE: 44 MMHG | SYSTOLIC BLOOD PRESSURE: 92 MMHG

## 2018-10-20 VITALS — SYSTOLIC BLOOD PRESSURE: 92 MMHG | DIASTOLIC BLOOD PRESSURE: 45 MMHG

## 2018-10-20 VITALS — DIASTOLIC BLOOD PRESSURE: 40 MMHG | SYSTOLIC BLOOD PRESSURE: 63 MMHG

## 2018-10-20 VITALS — DIASTOLIC BLOOD PRESSURE: 70 MMHG | SYSTOLIC BLOOD PRESSURE: 90 MMHG

## 2018-10-20 VITALS — SYSTOLIC BLOOD PRESSURE: 89 MMHG | DIASTOLIC BLOOD PRESSURE: 52 MMHG

## 2018-10-20 VITALS — DIASTOLIC BLOOD PRESSURE: 41 MMHG | SYSTOLIC BLOOD PRESSURE: 91 MMHG

## 2018-10-20 VITALS — SYSTOLIC BLOOD PRESSURE: 85 MMHG | DIASTOLIC BLOOD PRESSURE: 39 MMHG

## 2018-10-20 VITALS — SYSTOLIC BLOOD PRESSURE: 98 MMHG | DIASTOLIC BLOOD PRESSURE: 45 MMHG

## 2018-10-20 VITALS — SYSTOLIC BLOOD PRESSURE: 101 MMHG | DIASTOLIC BLOOD PRESSURE: 57 MMHG

## 2018-10-20 VITALS — SYSTOLIC BLOOD PRESSURE: 86 MMHG | DIASTOLIC BLOOD PRESSURE: 40 MMHG

## 2018-10-20 VITALS — SYSTOLIC BLOOD PRESSURE: 82 MMHG | DIASTOLIC BLOOD PRESSURE: 49 MMHG

## 2018-10-20 VITALS — SYSTOLIC BLOOD PRESSURE: 101 MMHG | DIASTOLIC BLOOD PRESSURE: 49 MMHG

## 2018-10-20 VITALS — DIASTOLIC BLOOD PRESSURE: 30 MMHG | SYSTOLIC BLOOD PRESSURE: 94 MMHG

## 2018-10-20 VITALS — SYSTOLIC BLOOD PRESSURE: 110 MMHG | DIASTOLIC BLOOD PRESSURE: 49 MMHG

## 2018-10-20 VITALS — SYSTOLIC BLOOD PRESSURE: 76 MMHG | DIASTOLIC BLOOD PRESSURE: 33 MMHG

## 2018-10-20 VITALS — DIASTOLIC BLOOD PRESSURE: 30 MMHG | SYSTOLIC BLOOD PRESSURE: 89 MMHG

## 2018-10-20 VITALS — SYSTOLIC BLOOD PRESSURE: 80 MMHG | DIASTOLIC BLOOD PRESSURE: 51 MMHG

## 2018-10-20 VITALS — SYSTOLIC BLOOD PRESSURE: 85 MMHG | DIASTOLIC BLOOD PRESSURE: 27 MMHG

## 2018-10-20 VITALS — DIASTOLIC BLOOD PRESSURE: 66 MMHG | SYSTOLIC BLOOD PRESSURE: 90 MMHG

## 2018-10-20 VITALS — SYSTOLIC BLOOD PRESSURE: 96 MMHG | DIASTOLIC BLOOD PRESSURE: 29 MMHG

## 2018-10-20 VITALS — SYSTOLIC BLOOD PRESSURE: 94 MMHG | DIASTOLIC BLOOD PRESSURE: 35 MMHG

## 2018-10-20 VITALS — DIASTOLIC BLOOD PRESSURE: 25 MMHG | SYSTOLIC BLOOD PRESSURE: 81 MMHG

## 2018-10-20 VITALS — DIASTOLIC BLOOD PRESSURE: 33 MMHG | SYSTOLIC BLOOD PRESSURE: 105 MMHG

## 2018-10-20 VITALS — DIASTOLIC BLOOD PRESSURE: 52 MMHG | SYSTOLIC BLOOD PRESSURE: 79 MMHG

## 2018-10-20 VITALS — SYSTOLIC BLOOD PRESSURE: 100 MMHG | DIASTOLIC BLOOD PRESSURE: 49 MMHG

## 2018-10-20 VITALS — SYSTOLIC BLOOD PRESSURE: 92 MMHG | DIASTOLIC BLOOD PRESSURE: 36 MMHG

## 2018-10-20 VITALS — SYSTOLIC BLOOD PRESSURE: 99 MMHG | DIASTOLIC BLOOD PRESSURE: 30 MMHG

## 2018-10-20 VITALS — SYSTOLIC BLOOD PRESSURE: 102 MMHG | DIASTOLIC BLOOD PRESSURE: 57 MMHG

## 2018-10-20 VITALS — DIASTOLIC BLOOD PRESSURE: 43 MMHG | SYSTOLIC BLOOD PRESSURE: 84 MMHG

## 2018-10-20 VITALS — DIASTOLIC BLOOD PRESSURE: 58 MMHG | SYSTOLIC BLOOD PRESSURE: 84 MMHG

## 2018-10-20 VITALS — DIASTOLIC BLOOD PRESSURE: 42 MMHG | SYSTOLIC BLOOD PRESSURE: 91 MMHG

## 2018-10-20 VITALS — SYSTOLIC BLOOD PRESSURE: 105 MMHG | DIASTOLIC BLOOD PRESSURE: 54 MMHG

## 2018-10-20 VITALS — SYSTOLIC BLOOD PRESSURE: 86 MMHG | DIASTOLIC BLOOD PRESSURE: 64 MMHG

## 2018-10-20 VITALS — SYSTOLIC BLOOD PRESSURE: 90 MMHG | DIASTOLIC BLOOD PRESSURE: 66 MMHG

## 2018-10-20 VITALS — DIASTOLIC BLOOD PRESSURE: 41 MMHG | SYSTOLIC BLOOD PRESSURE: 75 MMHG

## 2018-10-20 VITALS — SYSTOLIC BLOOD PRESSURE: 82 MMHG | DIASTOLIC BLOOD PRESSURE: 55 MMHG

## 2018-10-20 VITALS — DIASTOLIC BLOOD PRESSURE: 41 MMHG | SYSTOLIC BLOOD PRESSURE: 100 MMHG

## 2018-10-20 VITALS — SYSTOLIC BLOOD PRESSURE: 107 MMHG | DIASTOLIC BLOOD PRESSURE: 33 MMHG

## 2018-10-20 VITALS — SYSTOLIC BLOOD PRESSURE: 122 MMHG | DIASTOLIC BLOOD PRESSURE: 39 MMHG

## 2018-10-20 VITALS — DIASTOLIC BLOOD PRESSURE: 39 MMHG | SYSTOLIC BLOOD PRESSURE: 80 MMHG

## 2018-10-20 VITALS — DIASTOLIC BLOOD PRESSURE: 49 MMHG | SYSTOLIC BLOOD PRESSURE: 82 MMHG

## 2018-10-20 VITALS — DIASTOLIC BLOOD PRESSURE: 38 MMHG | SYSTOLIC BLOOD PRESSURE: 68 MMHG

## 2018-10-20 VITALS — DIASTOLIC BLOOD PRESSURE: 53 MMHG | SYSTOLIC BLOOD PRESSURE: 99 MMHG

## 2018-10-20 VITALS — SYSTOLIC BLOOD PRESSURE: 78 MMHG | DIASTOLIC BLOOD PRESSURE: 43 MMHG

## 2018-10-20 VITALS — DIASTOLIC BLOOD PRESSURE: 36 MMHG | SYSTOLIC BLOOD PRESSURE: 92 MMHG

## 2018-10-20 VITALS — SYSTOLIC BLOOD PRESSURE: 95 MMHG | DIASTOLIC BLOOD PRESSURE: 50 MMHG

## 2018-10-20 VITALS — SYSTOLIC BLOOD PRESSURE: 104 MMHG | DIASTOLIC BLOOD PRESSURE: 36 MMHG

## 2018-10-20 VITALS — SYSTOLIC BLOOD PRESSURE: 80 MMHG | DIASTOLIC BLOOD PRESSURE: 44 MMHG

## 2018-10-20 VITALS — SYSTOLIC BLOOD PRESSURE: 84 MMHG | DIASTOLIC BLOOD PRESSURE: 43 MMHG

## 2018-10-20 VITALS — DIASTOLIC BLOOD PRESSURE: 33 MMHG | SYSTOLIC BLOOD PRESSURE: 95 MMHG

## 2018-10-20 VITALS — SYSTOLIC BLOOD PRESSURE: 73 MMHG | DIASTOLIC BLOOD PRESSURE: 45 MMHG

## 2018-10-20 VITALS — DIASTOLIC BLOOD PRESSURE: 39 MMHG | SYSTOLIC BLOOD PRESSURE: 94 MMHG

## 2018-10-20 VITALS — DIASTOLIC BLOOD PRESSURE: 63 MMHG | SYSTOLIC BLOOD PRESSURE: 87 MMHG

## 2018-10-20 VITALS — DIASTOLIC BLOOD PRESSURE: 36 MMHG | SYSTOLIC BLOOD PRESSURE: 75 MMHG

## 2018-10-20 VITALS — DIASTOLIC BLOOD PRESSURE: 47 MMHG | SYSTOLIC BLOOD PRESSURE: 100 MMHG

## 2018-10-20 VITALS — SYSTOLIC BLOOD PRESSURE: 101 MMHG | DIASTOLIC BLOOD PRESSURE: 43 MMHG

## 2018-10-20 VITALS — DIASTOLIC BLOOD PRESSURE: 42 MMHG | SYSTOLIC BLOOD PRESSURE: 83 MMHG

## 2018-10-20 VITALS — SYSTOLIC BLOOD PRESSURE: 90 MMHG | DIASTOLIC BLOOD PRESSURE: 54 MMHG

## 2018-10-20 VITALS — SYSTOLIC BLOOD PRESSURE: 76 MMHG | DIASTOLIC BLOOD PRESSURE: 34 MMHG

## 2018-10-20 VITALS — DIASTOLIC BLOOD PRESSURE: 38 MMHG | SYSTOLIC BLOOD PRESSURE: 86 MMHG

## 2018-10-20 VITALS — SYSTOLIC BLOOD PRESSURE: 86 MMHG | DIASTOLIC BLOOD PRESSURE: 38 MMHG

## 2018-10-20 LAB
BASE EXCESS BLDA CALC-SCNC: -23.9 MMOL/L
BASOPHILS # BLD AUTO: 0.1 /CMM (ref 0–0.2)
BASOPHILS NFR BLD AUTO: 0.5 % (ref 0–2)
BUN SERPL-MCNC: 75 MG/DL (ref 7–18)
CALCIUM SERPL-MCNC: 9.6 MG/DL (ref 8.5–10.1)
CHLORIDE SERPL-SCNC: 105 MMOL/L (ref 98–107)
CO2 SERPL-SCNC: 15 MMOL/L (ref 21–32)
CREAT SERPL-MCNC: 3.4 MG/DL (ref 0.6–1.3)
EOSINOPHIL NFR BLD AUTO: 3.8 % (ref 0–6)
EOSINOPHIL NFR BLD MANUAL: 3 % (ref 0–4)
GLUCOSE SERPL-MCNC: 78 MG/DL (ref 74–106)
HCT VFR BLD AUTO: 31 % (ref 39–51)
HGB BLD-MCNC: 9.7 G/DL (ref 13.5–17.5)
INTRINSIC PEEP RESPIRATORY: 5 CM H2O
LYMPHOCYTES NFR BLD AUTO: 14.5 % (ref 20–44)
LYMPHOCYTES NFR BLD AUTO: 3 /CMM (ref 0.8–4.8)
LYMPHOCYTES NFR BLD MANUAL: 9 % (ref 16–48)
MCHC RBC AUTO-ENTMCNC: 31 G/DL (ref 31–36)
MCV RBC AUTO: 103 FL (ref 80–96)
MONOCYTES NFR BLD AUTO: 12.9 % (ref 2–12)
MONOCYTES NFR BLD AUTO: 2.7 /CMM (ref 0.1–1.3)
MONOCYTES NFR BLD MANUAL: 14 % (ref 0–11)
NEUTROPHILS # BLD AUTO: 14.1 /CMM (ref 1.8–8.9)
NEUTROPHILS NFR BLD AUTO: 68.3 % (ref 43–81)
NEUTS BAND NFR BLD MANUAL: 3 % (ref 0–5)
NEUTS SEG NFR BLD MANUAL: 71 % (ref 42–76)
PCO2 TEMP ADJ BLDA: 37.3 MMHG (ref 35–45)
PEEP SETTING VENT: 550 ML
PH TEMP ADJ BLDA: 6.93 [PH] (ref 7.35–7.45)
PLATELET # BLD AUTO: 67 /CMM (ref 150–450)
PO2 TEMP ADJ BLDA: 75.2 MMHG (ref 75–100)
POTASSIUM SERPL-SCNC: 5.3 MMOL/L (ref 3.5–5.1)
RBC # BLD AUTO: 3.02 MIL/UL (ref 4.5–6)
SAO2 % BLDA: 86.4 % (ref 92–98.5)
SET RATE, BG: 24
SODIUM SERPL-SCNC: 133 MMOL/L (ref 136–145)
VENTILATION MODE VENT: (no result)
WBC NRBC COR # BLD AUTO: 20.6 K/UL (ref 4.3–11)

## 2018-10-20 RX ADMIN — VANCOMYCIN HYDROCHLORIDE SCH MG: 125 CAPSULE ORAL at 11:07

## 2018-10-20 RX ADMIN — SODIUM CHLORIDE SCH MLS/HR: 9 INJECTION, SOLUTION INTRAVENOUS at 11:07

## 2018-10-20 RX ADMIN — DEXTROSE AND SODIUM CHLORIDE PRN MLS/HR: 5; 900 INJECTION, SOLUTION INTRAVENOUS at 05:42

## 2018-10-20 RX ADMIN — DEXTROSE MONOHYDRATE PRN MLS/HR: 50 INJECTION, SOLUTION INTRAVENOUS at 14:36

## 2018-10-20 RX ADMIN — TOBRAMYCIN AND DEXAMETHASONE SCH DROP: 3; 1 SUSPENSION/ DROPS OPHTHALMIC at 09:24

## 2018-10-20 RX ADMIN — DEXTROSE MONOHYDRATE PRN MLS/HR: 50 INJECTION, SOLUTION INTRAVENOUS at 17:02

## 2018-10-20 RX ADMIN — Medication SCH MLS/HR: at 17:08

## 2018-10-20 RX ADMIN — Medication SCH EACH: at 12:53

## 2018-10-20 RX ADMIN — Medication SCH OZ: at 09:25

## 2018-10-20 RX ADMIN — Medication SCH EACH: at 09:24

## 2018-10-20 RX ADMIN — AMIODARONE HYDROCHLORIDE SCH MG: 200 TABLET ORAL at 12:55

## 2018-10-20 RX ADMIN — DEXTROSE MONOHYDRATE PRN MLS/HR: 50 INJECTION, SOLUTION INTRAVENOUS at 11:02

## 2018-10-20 RX ADMIN — VANCOMYCIN HYDROCHLORIDE SCH MG: 125 CAPSULE ORAL at 17:09

## 2018-10-20 RX ADMIN — VANCOMYCIN HYDROCHLORIDE SCH MG: 125 CAPSULE ORAL at 05:29

## 2018-10-20 RX ADMIN — SODIUM CHLORIDE SCH MG: 9 INJECTION, SOLUTION INTRAVENOUS at 17:08

## 2018-10-20 RX ADMIN — Medication SCH EACH: at 05:41

## 2018-10-20 RX ADMIN — Medication SCH MLS/HR: at 09:28

## 2018-10-20 RX ADMIN — AMIODARONE HYDROCHLORIDE SCH MG: 200 TABLET ORAL at 17:08

## 2018-10-20 RX ADMIN — LEVOTHYROXINE SODIUM SCH MCG: 75 TABLET ORAL at 05:36

## 2018-10-20 RX ADMIN — SODIUM CHLORIDE SCH MG: 9 INJECTION, SOLUTION INTRAVENOUS at 05:29

## 2018-10-20 RX ADMIN — LEVETIRACETAM SCH MG: 250 TABLET, FILM COATED ORAL at 09:28

## 2018-10-20 RX ADMIN — NYSTATIN SCH APPLIC: 100000 CREAM TOPICAL at 14:37

## 2018-10-20 RX ADMIN — DEXTROSE MONOHYDRATE PRN MLS/HR: 50 INJECTION, SOLUTION INTRAVENOUS at 21:45

## 2018-10-20 RX ADMIN — LEVETIRACETAM SCH MG: 250 TABLET, FILM COATED ORAL at 17:08

## 2018-10-20 RX ADMIN — TOBRAMYCIN AND DEXAMETHASONE SCH DROP: 3; 1 SUSPENSION/ DROPS OPHTHALMIC at 17:05

## 2018-10-20 RX ADMIN — SODIUM CHLORIDE SCH MG: 9 INJECTION, SOLUTION INTRAVENOUS at 11:07

## 2018-10-20 RX ADMIN — SODIUM CHLORIDE SCH MG: 9 INJECTION, SOLUTION INTRAVENOUS at 09:28

## 2018-10-20 RX ADMIN — Medication SCH EACH: at 17:03

## 2018-10-20 RX ADMIN — Medication SCH EACH: at 00:19

## 2018-10-20 RX ADMIN — Medication SCH EACH: at 17:08

## 2018-10-20 RX ADMIN — Medication SCH EACH: at 09:27

## 2018-10-20 RX ADMIN — MUPIROCIN SCH APPLIC: 20 OINTMENT TOPICAL at 09:25

## 2018-10-20 RX ADMIN — NYSTATIN SCH APPLIC: 100000 CREAM TOPICAL at 02:19

## 2018-10-20 RX ADMIN — Medication SCH EACH: at 20:52

## 2018-10-20 RX ADMIN — AMIODARONE HYDROCHLORIDE SCH MG: 200 TABLET ORAL at 09:27

## 2018-10-20 NOTE — NUR
ICU NOTES:



PT UNSTABLE AT THIS TIME, VASOPRESSIN STARTED. FORGOT TO SCAN MEDICATION, PRECEPTOR SAY GRAHAM AWARE.

## 2018-10-20 NOTE — NUR
pt received on vent via trach with charted settings. airway patent. trach secure via trach 
tie. pt awake. 

ambu bag and back up trach at bedside. alarms set and audible, disconnect alarms checked 
plugged into red outlet

suctioned a moderate amount of thick yellow secretions. pt receiving no breathing tx at this 
time. pt hob at 30 degrees. suctioned oral secretion from pts mouth

-------------------------------------------------------------------------------

Addendum: 10/20/18 at 1928 by DEBORAH LEWIS RT

-------------------------------------------------------------------------------

Amended: Links added.

## 2018-10-20 NOTE — NUR
ICU/LVN

PT'S BLOOD PRESSURE HAS BEEN IN THE HIGH 80'S THEN 75/34, NOTIFIED CHARGE NURSE RN WHO THEN 
INCREASED THE LEVO TO 22 MCG FROM 20MCG. WILL CONTINUE TO MONITOR THIS PT'S BP.

## 2018-10-20 NOTE — NUR
ICU NOTES:



ACCU CHECK DONE 62MG/DL, RYAN ZAPIEN NP MADE AWARE AND ORDERED D50 IVP ONE TIME DOSE. 
BG RECHECKED, 156MG/DL.

## 2018-10-20 NOTE — NUR
ICU/LVN

PT'S BLOOD PRESSURE HAS BEEN IN THE HIGH 80'S FOR A FEW CYCLES, NOTIFIED CHARGE NURSE RN WHO 
THEN INCREASED THE LEVO TO 20 MCG FROM 18MCG. WILL CONTINUE TO MONITOR THIS PT'S BP.

## 2018-10-20 NOTE — NUR
ICU NOTES:



RECEIVED PT ON VENT/TRACH FIO2 50% SATURATING 96%. PT NON VERBAL, NO RESPONSE TO VERBAL AND 
TACTILE STIMULATION. PT ON 2 VASOPRESSORS, LEVOPHED @ 40MCG/MIN AND MICHAEL @ 150MCG/MIN WITH BP 
/36 MMHG. ON AKIRA HUGGER FOR SUBNORMAL TEMP OF 95 DEGREES.

## 2018-10-21 VITALS — DIASTOLIC BLOOD PRESSURE: 33 MMHG | SYSTOLIC BLOOD PRESSURE: 67 MMHG

## 2018-10-21 VITALS — SYSTOLIC BLOOD PRESSURE: 67 MMHG | DIASTOLIC BLOOD PRESSURE: 33 MMHG

## 2018-10-21 RX ADMIN — DEXTROSE MONOHYDRATE PRN MLS/HR: 50 INJECTION, SOLUTION INTRAVENOUS at 00:13

## 2018-10-21 NOTE — NUR
ICU/RN

PT WAS MADE A DNR AFTER JOHN GRAHAM SPOKE W/PT'S WIFE AT LENGTH REGARDIND CRITICAL CONDITION OF 
PT.

## 2018-10-21 NOTE — NUR
ICU/RN

ASYSTOLE ON MONITOR.PRONOUNCED BY ED RN CN NOTIFIED SUPERVISOR,DR AMELIE GILL OF PT'S 
DEMISE.[facility] CONTACTED ANDSAID THEY WILL CALL BACK

## 2018-10-21 NOTE — NUR
ICU/RN

CALLED WIFE IF SHE'S COMING OR NOT AS SHE EXPRESSED EARLIER THAT SHE WOULD COME.NOT ABLE TO 
COME AND SEE  AS SHE DOES NOT HAVE A RIDE.PT WILL BE SENT TO [facility].
